# Patient Record
Sex: FEMALE | Race: WHITE | NOT HISPANIC OR LATINO | Employment: OTHER | URBAN - METROPOLITAN AREA
[De-identification: names, ages, dates, MRNs, and addresses within clinical notes are randomized per-mention and may not be internally consistent; named-entity substitution may affect disease eponyms.]

---

## 2017-06-20 ENCOUNTER — CONVERSION ENCOUNTER (OUTPATIENT)
Dept: RADIOLOGY | Facility: IMAGING CENTER | Age: 67
End: 2017-06-20

## 2017-07-27 ENCOUNTER — GENERIC CONVERSION - ENCOUNTER (OUTPATIENT)
Dept: OTHER | Facility: OTHER | Age: 67
End: 2017-07-27

## 2017-08-31 ENCOUNTER — GENERIC CONVERSION - ENCOUNTER (OUTPATIENT)
Dept: OTHER | Facility: OTHER | Age: 67
End: 2017-08-31

## 2017-10-05 ENCOUNTER — GENERIC CONVERSION - ENCOUNTER (OUTPATIENT)
Dept: OTHER | Facility: OTHER | Age: 67
End: 2017-10-05

## 2017-11-09 ENCOUNTER — GENERIC CONVERSION - ENCOUNTER (OUTPATIENT)
Dept: OTHER | Facility: OTHER | Age: 67
End: 2017-11-09

## 2017-12-08 ENCOUNTER — GENERIC CONVERSION - ENCOUNTER (OUTPATIENT)
Dept: OTHER | Facility: OTHER | Age: 67
End: 2017-12-08

## 2018-01-11 ENCOUNTER — ALLSCRIPTS OFFICE VISIT (OUTPATIENT)
Dept: OTHER | Facility: OTHER | Age: 68
End: 2018-01-11

## 2018-01-14 VITALS
BODY MASS INDEX: 30.53 KG/M2 | WEIGHT: 190 LBS | SYSTOLIC BLOOD PRESSURE: 120 MMHG | HEIGHT: 66 IN | DIASTOLIC BLOOD PRESSURE: 74 MMHG

## 2018-01-22 VITALS
WEIGHT: 190 LBS | SYSTOLIC BLOOD PRESSURE: 148 MMHG | HEIGHT: 66 IN | DIASTOLIC BLOOD PRESSURE: 82 MMHG | BODY MASS INDEX: 30.53 KG/M2

## 2018-01-22 VITALS
HEIGHT: 66 IN | BODY MASS INDEX: 30.53 KG/M2 | WEIGHT: 190 LBS | SYSTOLIC BLOOD PRESSURE: 146 MMHG | DIASTOLIC BLOOD PRESSURE: 80 MMHG

## 2018-01-22 VITALS
WEIGHT: 190 LBS | HEIGHT: 66 IN | DIASTOLIC BLOOD PRESSURE: 86 MMHG | SYSTOLIC BLOOD PRESSURE: 140 MMHG | BODY MASS INDEX: 30.53 KG/M2

## 2018-01-24 VITALS
BODY MASS INDEX: 30.53 KG/M2 | HEIGHT: 66 IN | WEIGHT: 190 LBS | DIASTOLIC BLOOD PRESSURE: 70 MMHG | SYSTOLIC BLOOD PRESSURE: 120 MMHG

## 2018-01-24 VITALS
HEIGHT: 66 IN | BODY MASS INDEX: 30.53 KG/M2 | DIASTOLIC BLOOD PRESSURE: 76 MMHG | WEIGHT: 190 LBS | SYSTOLIC BLOOD PRESSURE: 138 MMHG

## 2018-02-13 RX ORDER — CHLORAL HYDRATE 500 MG
1000 CAPSULE ORAL DAILY
COMMUNITY

## 2018-02-13 RX ORDER — PYRIDOXINE HCL (VITAMIN B6) 100 MG
TABLET ORAL
COMMUNITY
End: 2018-07-09 | Stop reason: CLARIF

## 2018-02-13 RX ORDER — ERGOCALCIFEROL (VITAMIN D2) 10 MCG
1 TABLET ORAL DAILY
COMMUNITY

## 2018-02-13 RX ORDER — BENAZEPRIL HYDROCHLORIDE 10 MG/1
TABLET ORAL
COMMUNITY
Start: 2017-09-11 | End: 2019-05-16

## 2018-02-13 RX ORDER — POTASSIUM CHLORIDE 750 MG/1
10 CAPSULE, EXTENDED RELEASE ORAL DAILY
COMMUNITY
Start: 2017-03-23 | End: 2020-11-03 | Stop reason: SDUPTHER

## 2018-02-13 RX ORDER — WARFARIN SODIUM 3 MG/1
6 TABLET ORAL
COMMUNITY
Start: 2017-03-23 | End: 2020-11-03 | Stop reason: ALTCHOICE

## 2018-02-13 RX ORDER — FUROSEMIDE 20 MG/1
TABLET ORAL
COMMUNITY
End: 2018-07-09 | Stop reason: CLARIF

## 2018-02-13 RX ORDER — PHENYTOIN SODIUM 100 MG/1
100 CAPSULE, EXTENDED RELEASE ORAL EVERY 12 HOURS SCHEDULED
COMMUNITY

## 2018-02-13 RX ORDER — AMLODIPINE BESYLATE AND BENAZEPRIL HYDROCHLORIDE 10; 20 MG/1; MG/1
CAPSULE ORAL
COMMUNITY
Start: 2017-07-31

## 2018-02-13 RX ORDER — FUROSEMIDE 40 MG/1
TABLET ORAL
COMMUNITY
End: 2019-05-16

## 2018-02-13 RX ORDER — ESCITALOPRAM OXALATE 10 MG/1
10 TABLET ORAL DAILY
COMMUNITY
End: 2021-03-19 | Stop reason: ALTCHOICE

## 2018-02-15 ENCOUNTER — OFFICE VISIT (OUTPATIENT)
Dept: UROLOGY | Facility: MEDICAL CENTER | Age: 68
End: 2018-02-15
Payer: MEDICARE

## 2018-02-15 VITALS
HEIGHT: 66 IN | BODY MASS INDEX: 30.53 KG/M2 | WEIGHT: 190 LBS | SYSTOLIC BLOOD PRESSURE: 140 MMHG | DIASTOLIC BLOOD PRESSURE: 80 MMHG

## 2018-02-15 DIAGNOSIS — N31.9 NEUROGENIC BLADDER: Primary | ICD-10-CM

## 2018-02-15 PROCEDURE — 99213 OFFICE O/P EST LOW 20 MIN: CPT | Performed by: NURSE PRACTITIONER

## 2018-02-15 RX ORDER — M-VIT,TX,IRON,MINS/CALC/FOLIC 27MG-0.4MG
1 TABLET ORAL
COMMUNITY
Start: 2013-12-12 | End: 2019-05-16

## 2018-02-15 RX ORDER — CARBIDOPA/LEVODOPA 25MG-250MG
0.5 TABLET ORAL 4 TIMES DAILY
COMMUNITY
Start: 2018-01-31 | End: 2020-11-03 | Stop reason: ALTCHOICE

## 2018-02-15 NOTE — PROGRESS NOTES
Suprapubic Catheter Exchange PROCEDURE NOTE      Pre-operative Diagnosis: Neurogenic Bladder          Post-operative Diagnosis: same      INDICATION   78 old female with paraplegia resultant NGB & long term SPT  TIME OUT: Correct patient identity  PROCEDURE   Consent: Patient was agreeable  Formal Informed consent however, not applicable  Under sterile conditions the patient was positioned  Betadine solution and sterile drapes were utilized  Non- Petroleum based gel was used to lubricate Cystotomy insertion site  90GQ silicone catheter inserted   Urine was obtained without any difficulties and Without evidence of hematuria or trauma  A Drain sponge was applied to the insertion site and wound care instructions were provided  Findings  30 ml of clear yellow urine was obtained  Note: Patient's bladder was essentially empty at time of catheter insertion      Complications:  None; patient tolerated the procedure well  Condition: stable      Plan  Maintain SPT to straight drainage  Flush daily using Shannon syringe and 60 ml NSS  Next exchange in 5 weeks  No further  intervention required                SVETLANA Bolanos              Attending Attestation: Not Applicable          Revision History       Date/Time User Provider Type Action     6/29/2017  4:44 PM Chuck Bolanos Nurse Practitioner Sign     6/29/2017  4:43 PM Chuck Bolanos Nurse Practitioner Sign     View Details Report

## 2018-03-20 LAB
PT - I.N. RATIO (HISTORICAL): 2 RATIO(INR)
PT, PATIENT (HISTORICAL): 20.2 SEC (ref 9.2–11.1)

## 2018-03-22 ENCOUNTER — PROCEDURE VISIT (OUTPATIENT)
Dept: UROLOGY | Facility: MEDICAL CENTER | Age: 68
End: 2018-03-22
Payer: MEDICARE

## 2018-03-22 DIAGNOSIS — N31.9 NEUROGENIC BLADDER: Primary | ICD-10-CM

## 2018-03-22 PROCEDURE — 51705 CHANGE OF BLADDER TUBE: CPT | Performed by: UROLOGY

## 2018-03-22 NOTE — PROGRESS NOTES
Cystostomy tube change     Date/Time 3/22/2018 2:36 PM     Performed by  Veronica Cartwright by Franck Rodrigues      Procedure Details   Procedure Notes: New 25 F tube placed without difficulty

## 2018-04-27 ENCOUNTER — OFFICE VISIT (OUTPATIENT)
Dept: UROLOGY | Facility: MEDICAL CENTER | Age: 68
End: 2018-04-27

## 2018-04-27 VITALS — HEIGHT: 66 IN | BODY MASS INDEX: 30.53 KG/M2 | WEIGHT: 190 LBS

## 2018-04-27 DIAGNOSIS — R33.9 URINARY RETENTION: ICD-10-CM

## 2018-04-27 DIAGNOSIS — N31.9 NEUROGENIC BLADDER: Primary | ICD-10-CM

## 2018-04-27 PROCEDURE — 51705 CHANGE OF BLADDER TUBE: CPT

## 2018-06-04 ENCOUNTER — CLINICAL SUPPORT (OUTPATIENT)
Dept: UROLOGY | Facility: MEDICAL CENTER | Age: 68
End: 2018-06-04
Payer: MEDICARE

## 2018-06-04 VITALS
SYSTOLIC BLOOD PRESSURE: 130 MMHG | DIASTOLIC BLOOD PRESSURE: 80 MMHG | HEIGHT: 66 IN | BODY MASS INDEX: 30.53 KG/M2 | WEIGHT: 190 LBS

## 2018-06-04 DIAGNOSIS — R33.9 URINARY RETENTION: Primary | ICD-10-CM

## 2018-06-04 PROCEDURE — 51705 CHANGE OF BLADDER TUBE: CPT

## 2018-06-04 NOTE — PROGRESS NOTES
Cystostomy tube change     Date/Time 6/4/2018 1:32 PM     Performed by  Oren Mejias by Magnolia Porter       Consent: Verbal consent obtained  Consent given by: patient  Patient identity confirmed: verbally with patient           Patient returns for routine S/P tube change  Patient prepped and 18 fr  catheter inserted using sterile technique  Urine return clear  Patient tolerated procedure well  Meatus/Stoma site clean and free of debris, no irritation or redness noted  Patient denies fevers or urinary symptoms  Patient encouraged to maintain adequate fluid intake

## 2018-06-05 NOTE — PROGRESS NOTES
I have reviewed the notes, assessments, and/or procedures performed by the medical assistant, I concur with her/his documentation of Angus Best

## 2018-06-19 LAB
PT - I.N. RATIO (HISTORICAL): 2.3 RATIO (INR) (ref 0.89–1.1)
PT, PATIENT (HISTORICAL): 23.4 SEC (ref 9.5–11.6)

## 2018-07-09 ENCOUNTER — CLINICAL SUPPORT (OUTPATIENT)
Dept: UROLOGY | Facility: MEDICAL CENTER | Age: 68
End: 2018-07-09
Payer: MEDICARE

## 2018-07-09 VITALS — WEIGHT: 190 LBS | BODY MASS INDEX: 30.53 KG/M2 | HEIGHT: 66 IN

## 2018-07-09 DIAGNOSIS — N31.9 NEUROGENIC BLADDER: Primary | ICD-10-CM

## 2018-07-09 PROCEDURE — 51710 CHANGE OF BLADDER TUBE: CPT

## 2018-07-09 NOTE — PROGRESS NOTES
Cystostomy tube change     Date/Time 7/9/2018 1:08 PM     Performed by  Yosef Tracy by Brianna Oates       Consent: Verbal consent obtained  Consent given by: patient  Patient understanding: patient states understanding of the procedure being performed      Site preparation: Betadine   Procedure Details   Procedure Notes: Pt presents for routine SPT change  SP site is clean, dry, intact, free of drainage or debris  Urine in overnight bag is clear, yellow  SPT changed without complication  Pt tolerated procedure well  Pt brings her own supplies  SPT is complicated due to pt's large pannus and somewhat immobility, making access to SP site difficult    Patient tolerance: Patient tolerated the procedure well with no immediate complications

## 2018-08-13 ENCOUNTER — CLINICAL SUPPORT (OUTPATIENT)
Dept: UROLOGY | Facility: MEDICAL CENTER | Age: 68
End: 2018-08-13
Payer: MEDICARE

## 2018-08-13 VITALS — HEIGHT: 66 IN | BODY MASS INDEX: 30.53 KG/M2 | WEIGHT: 190 LBS

## 2018-08-13 DIAGNOSIS — N31.9 NEUROGENIC BLADDER: Primary | ICD-10-CM

## 2018-08-13 PROCEDURE — 51710 CHANGE OF BLADDER TUBE: CPT

## 2018-08-13 NOTE — PROGRESS NOTES
Cystostomy tube change     Date/Time 8/13/2018 12:49 PM     Performed by  Robb Centeno by Ahmet Cervantes       Consent: Verbal consent obtained  Consent given by: patient  Patient understanding: patient states understanding of the procedure being performed      Site preparation: Betadine   Procedure Details   Patient tolerance: Patient tolerated the procedure well with no immediate complications           Pt presents for routine SPT change  SP site is clean, dry, intact, free of drainage or debris  Urine in overnight bag is clear, yellow  SPT changed without complication  Pt tolerated procedure well  Pt brings her own supplies  SPT is complicated due to pt's large pannus and somewhat immobility, making access to SP site difficult

## 2018-08-28 ENCOUNTER — TRANSCRIBE ORDERS (OUTPATIENT)
Dept: ADMINISTRATIVE | Facility: HOSPITAL | Age: 68
End: 2018-08-28

## 2018-08-28 ENCOUNTER — HOSPITAL ENCOUNTER (OUTPATIENT)
Dept: RADIOLOGY | Facility: IMAGING CENTER | Age: 68
Discharge: HOME | End: 2018-08-28
Payer: MEDICARE

## 2018-08-28 ENCOUNTER — APPOINTMENT (OUTPATIENT)
Dept: LAB | Facility: IMAGING CENTER | Age: 68
End: 2018-08-28
Payer: MEDICARE

## 2018-08-28 DIAGNOSIS — E78.5 HYPERLIPIDEMIA, UNSPECIFIED HYPERLIPIDEMIA TYPE: ICD-10-CM

## 2018-08-28 DIAGNOSIS — I10 ESSENTIAL HYPERTENSION: ICD-10-CM

## 2018-08-28 DIAGNOSIS — I82.5Z9 CHRONIC DEEP VEIN THROMBOSIS (DVT) OF DISTAL VEIN OF LOWER EXTREMITY, UNSPECIFIED LATERALITY (HCC): ICD-10-CM

## 2018-08-28 DIAGNOSIS — E03.1 CONGENITAL HYPOTHYROIDISM: ICD-10-CM

## 2018-08-28 DIAGNOSIS — D64.9 ANEMIA, UNSPECIFIED TYPE: ICD-10-CM

## 2018-08-28 DIAGNOSIS — I10 ESSENTIAL HYPERTENSION: Primary | ICD-10-CM

## 2018-08-28 DIAGNOSIS — M19.042 ARTHRITIS OF LEFT HAND: Primary | ICD-10-CM

## 2018-08-28 DIAGNOSIS — M19.042 ARTHRITIS OF LEFT HAND: ICD-10-CM

## 2018-08-28 DIAGNOSIS — E03.1 CONGENITAL HYPOTHYROIDISM: Primary | ICD-10-CM

## 2018-08-28 LAB
ALBUMIN SERPL BCP-MCNC: 3.8 G/DL (ref 3.5–5)
ALP SERPL-CCNC: 95 U/L (ref 46–116)
ALT SERPL W P-5'-P-CCNC: 13 U/L (ref 12–78)
ANION GAP SERPL CALCULATED.3IONS-SCNC: 5 MMOL/L (ref 4–13)
AST SERPL W P-5'-P-CCNC: 23 U/L (ref 5–45)
BILIRUB SERPL-MCNC: 0.39 MG/DL (ref 0.2–1)
BUN SERPL-MCNC: 10 MG/DL (ref 5–25)
CALCIUM SERPL-MCNC: 8.5 MG/DL (ref 8.3–10.1)
CHLORIDE SERPL-SCNC: 96 MMOL/L (ref 100–108)
CHOLEST SERPL-MCNC: 210 MG/DL (ref 50–200)
CO2 SERPL-SCNC: 30 MMOL/L (ref 21–32)
CREAT SERPL-MCNC: 0.24 MG/DL (ref 0.6–1.3)
ERYTHROCYTE [DISTWIDTH] IN BLOOD BY AUTOMATED COUNT: 13 % (ref 11.6–15.1)
ERYTHROCYTE [SEDIMENTATION RATE] IN BLOOD: 12 MM/HOUR (ref 0–20)
GFR SERPL CREATININE-BSD FRML MDRD: 128 ML/MIN/1.73SQ M
GLUCOSE P FAST SERPL-MCNC: 78 MG/DL (ref 65–99)
HCT VFR BLD AUTO: 41.7 % (ref 34.8–46.1)
HDLC SERPL-MCNC: 98 MG/DL (ref 40–60)
HGB BLD-MCNC: 13.6 G/DL (ref 11.5–15.4)
LDLC SERPL CALC-MCNC: 102 MG/DL (ref 0–100)
MCH RBC QN AUTO: 29.4 PG (ref 26.8–34.3)
MCHC RBC AUTO-ENTMCNC: 32.6 G/DL (ref 31.4–37.4)
MCV RBC AUTO: 90 FL (ref 82–98)
NONHDLC SERPL-MCNC: 112 MG/DL
PLATELET # BLD AUTO: 287 THOUSANDS/UL (ref 149–390)
PMV BLD AUTO: 9.4 FL (ref 8.9–12.7)
POTASSIUM SERPL-SCNC: 4.9 MMOL/L (ref 3.5–5.3)
PROT SERPL-MCNC: 7.9 G/DL (ref 6.4–8.2)
RBC # BLD AUTO: 4.63 MILLION/UL (ref 3.81–5.12)
SODIUM SERPL-SCNC: 131 MMOL/L (ref 136–145)
TRIGL SERPL-MCNC: 49 MG/DL
TSH SERPL DL<=0.05 MIU/L-ACNC: 0.46 UIU/ML (ref 0.36–3.74)
WBC # BLD AUTO: 3.98 THOUSAND/UL (ref 4.31–10.16)

## 2018-08-28 PROCEDURE — 73110 X-RAY EXAM OF WRIST: CPT

## 2018-08-28 PROCEDURE — 84443 ASSAY THYROID STIM HORMONE: CPT

## 2018-08-28 PROCEDURE — 85652 RBC SED RATE AUTOMATED: CPT

## 2018-08-28 PROCEDURE — 80061 LIPID PANEL: CPT

## 2018-08-28 PROCEDURE — 36415 COLL VENOUS BLD VENIPUNCTURE: CPT

## 2018-08-28 PROCEDURE — 85027 COMPLETE CBC AUTOMATED: CPT

## 2018-08-28 PROCEDURE — 80053 COMPREHEN METABOLIC PANEL: CPT

## 2018-09-17 ENCOUNTER — CLINICAL SUPPORT (OUTPATIENT)
Dept: UROLOGY | Facility: MEDICAL CENTER | Age: 68
End: 2018-09-17
Payer: MEDICARE

## 2018-09-17 DIAGNOSIS — R33.9 RETENTION OF URINE: Primary | ICD-10-CM

## 2018-09-17 PROCEDURE — 51710 CHANGE OF BLADDER TUBE: CPT

## 2018-09-17 NOTE — PROGRESS NOTES
Cystostomy tube change     Date/Time 9/17/2018 1:35 PM     Performed by  SIMEON GRAF     Authorized by Jose Brown       Consent: Verbal consent obtained  Risks and benefits: risks, benefits and alternatives were discussed  Consent given by: patient  Patient understanding: patient states understanding of the procedure being performed  Patient consent: the patient's understanding of the procedure matches consent given  Patient identity confirmed: verbally with patient      Site preparation: Betadine    Local anesthesia used: no     Anesthesia   Local anesthesia used: no     Sedation   Patient sedated: no      Specimen: no    Culture: no   Procedure Details   Procedure Notes: S/P tube changed with difficulty while pt was in chair at her request  Difficulty was from pt habitus and since it was done in the chair  Pt brings her own supplies  Return of hematuria without clots     Patient tolerance: Patient tolerated the procedure well with no immediate complications

## 2018-10-22 ENCOUNTER — CLINICAL SUPPORT (OUTPATIENT)
Dept: UROLOGY | Facility: MEDICAL CENTER | Age: 68
End: 2018-10-22
Payer: MEDICARE

## 2018-10-22 VITALS
HEIGHT: 66 IN | SYSTOLIC BLOOD PRESSURE: 132 MMHG | WEIGHT: 190 LBS | BODY MASS INDEX: 30.53 KG/M2 | DIASTOLIC BLOOD PRESSURE: 84 MMHG

## 2018-10-22 DIAGNOSIS — R82.90 ABNORMAL URINE: ICD-10-CM

## 2018-10-22 DIAGNOSIS — Z87.440 HISTORY OF UTI: ICD-10-CM

## 2018-10-22 DIAGNOSIS — Z87.440 HISTORY OF UTI: Primary | ICD-10-CM

## 2018-10-22 DIAGNOSIS — N31.9 NEUROGENIC BLADDER: Primary | ICD-10-CM

## 2018-10-22 PROCEDURE — 87186 SC STD MICRODIL/AGAR DIL: CPT | Performed by: UROLOGY

## 2018-10-22 PROCEDURE — 87086 URINE CULTURE/COLONY COUNT: CPT | Performed by: UROLOGY

## 2018-10-22 PROCEDURE — 51705 CHANGE OF BLADDER TUBE: CPT | Performed by: UROLOGY

## 2018-10-22 PROCEDURE — 87077 CULTURE AEROBIC IDENTIFY: CPT | Performed by: UROLOGY

## 2018-10-22 NOTE — PROGRESS NOTES
Patient presents for her routine S/P tube change  She remains in her wheelchair as the indwelling catheter was removed gently and without complication  She was prepped with Betadine solution and an 18 Silicone catheter was inserted into the bladder using sterile technique  The balloon was inflated with 10 CCs of sterile water and was connected to a leg bag  She states at a visit last week with her PCP, they asked for a urine sample  She requested that I take some from the new Osorio in case they wanted it for testing  I was able to acquire a small urine sample  She's been scheduled for another tube change in 5 weeks  *Spoke with a nurse from her PCP's office; they would like a culture done  I'll send it out   *

## 2018-10-24 LAB
BACTERIA UR CULT: ABNORMAL
BACTERIA UR CULT: ABNORMAL

## 2018-10-30 ENCOUNTER — TRANSCRIBE ORDERS (OUTPATIENT)
Dept: ADMINISTRATIVE | Facility: HOSPITAL | Age: 68
End: 2018-10-30

## 2018-10-30 ENCOUNTER — APPOINTMENT (OUTPATIENT)
Dept: LAB | Facility: IMAGING CENTER | Age: 68
End: 2018-10-30
Payer: MEDICARE

## 2018-10-30 DIAGNOSIS — I82.529: Primary | ICD-10-CM

## 2018-10-30 DIAGNOSIS — I82.529: ICD-10-CM

## 2018-10-30 DIAGNOSIS — E78.5 HYPERLIPIDEMIA, UNSPECIFIED HYPERLIPIDEMIA TYPE: Primary | ICD-10-CM

## 2018-10-30 DIAGNOSIS — E78.5 HYPERLIPIDEMIA, UNSPECIFIED HYPERLIPIDEMIA TYPE: ICD-10-CM

## 2018-10-30 LAB
ALBUMIN SERPL BCP-MCNC: 3.9 G/DL (ref 3.5–5)
ALP SERPL-CCNC: 114 U/L (ref 46–116)
ALT SERPL W P-5'-P-CCNC: 24 U/L (ref 12–78)
AST SERPL W P-5'-P-CCNC: 19 U/L (ref 5–45)
BILIRUB DIRECT SERPL-MCNC: 0.11 MG/DL (ref 0–0.2)
BILIRUB SERPL-MCNC: 0.23 MG/DL (ref 0.2–1)
INR PPP: 2.89 (ref 0.86–1.17)
PROT SERPL-MCNC: 7.9 G/DL (ref 6.4–8.2)
PROTHROMBIN TIME: 30.3 SECONDS (ref 11.8–14.2)

## 2018-10-30 PROCEDURE — 36415 COLL VENOUS BLD VENIPUNCTURE: CPT

## 2018-10-30 PROCEDURE — 80076 HEPATIC FUNCTION PANEL: CPT

## 2018-10-30 PROCEDURE — 85610 PROTHROMBIN TIME: CPT

## 2018-11-26 ENCOUNTER — CLINICAL SUPPORT (OUTPATIENT)
Dept: UROLOGY | Facility: MEDICAL CENTER | Age: 68
End: 2018-11-26
Payer: MEDICARE

## 2018-11-26 DIAGNOSIS — R33.9 RETENTION OF URINE: Primary | ICD-10-CM

## 2018-11-26 PROCEDURE — 99211 OFF/OP EST MAY X REQ PHY/QHP: CPT

## 2018-11-26 NOTE — PROGRESS NOTES
Cystostomy tube change     Date/Time 11/26/2018 3:07 PM     Performed by  SIMEON GRAF     Authorized by Geoffrey Metcalf       Consent: Verbal consent obtained  Consent given by: patient  Patient understanding: patient states understanding of the procedure being performed  Patient consent: the patient's understanding of the procedure matches consent given  Procedure consent: procedure consent matches procedure scheduled  Patient identity confirmed: verbally with patient      Preparation: Patient was prepped and draped in the usual sterile fashion  Local anesthesia used: no     Anesthesia   Local anesthesia used: no     Sedation   Patient sedated: no     Procedure Details   Procedure Notes:     Lucy Mccarthy  1950  [id number]          S/P tube removed after deflation of an intact balloon  Site prepped with  HIBICLENS  New #[id number] silicone spt change via aseptic technique with with difficulty  This was d/t pt habitus and changing s/p tube in her chair  Irrigated easily for clear mariel urine return  Attached to drainage bag as usual  Encouraged pt to consider having s/p tube changed at home  She doesn't want to do that            Patient tolerance: Patient tolerated the procedure well with no immediate complications

## 2018-12-18 ENCOUNTER — TRANSCRIBE ORDERS (OUTPATIENT)
Dept: ADMINISTRATIVE | Facility: HOSPITAL | Age: 68
End: 2018-12-18

## 2018-12-19 ENCOUNTER — TRANSCRIBE ORDERS (OUTPATIENT)
Dept: ADMINISTRATIVE | Facility: HOSPITAL | Age: 68
End: 2018-12-19

## 2018-12-19 ENCOUNTER — APPOINTMENT (OUTPATIENT)
Dept: LAB | Facility: IMAGING CENTER | Age: 68
End: 2018-12-19
Payer: MEDICARE

## 2018-12-19 DIAGNOSIS — I82.529 CHRONIC DEEP VEIN THROMBOSIS (DVT) OF ILIAC VEIN, UNSPECIFIED LATERALITY (HCC): ICD-10-CM

## 2018-12-19 DIAGNOSIS — E78.5 HYPERLIPIDEMIA, UNSPECIFIED HYPERLIPIDEMIA TYPE: ICD-10-CM

## 2018-12-19 DIAGNOSIS — I82.529 CHRONIC DEEP VEIN THROMBOSIS (DVT) OF ILIAC VEIN, UNSPECIFIED LATERALITY (HCC): Primary | ICD-10-CM

## 2018-12-19 DIAGNOSIS — E78.5 HYPERLIPIDEMIA, UNSPECIFIED HYPERLIPIDEMIA TYPE: Primary | ICD-10-CM

## 2018-12-19 LAB
CHOLEST SERPL-MCNC: 162 MG/DL (ref 50–200)
HDLC SERPL-MCNC: 95 MG/DL (ref 40–60)
INR PPP: 2.32 (ref 0.86–1.17)
LDLC SERPL CALC-MCNC: 57 MG/DL (ref 0–100)
PROTHROMBIN TIME: 25.5 SECONDS (ref 11.8–14.2)
TRIGL SERPL-MCNC: 52 MG/DL

## 2018-12-19 PROCEDURE — 36415 COLL VENOUS BLD VENIPUNCTURE: CPT

## 2018-12-19 PROCEDURE — 80061 LIPID PANEL: CPT

## 2018-12-19 PROCEDURE — 85610 PROTHROMBIN TIME: CPT

## 2018-12-31 ENCOUNTER — CLINICAL SUPPORT (OUTPATIENT)
Dept: UROLOGY | Facility: MEDICAL CENTER | Age: 68
End: 2018-12-31
Payer: MEDICARE

## 2018-12-31 DIAGNOSIS — N31.9 NEUROGENIC BLADDER: Primary | ICD-10-CM

## 2018-12-31 PROCEDURE — 99211 OFF/OP EST MAY X REQ PHY/QHP: CPT

## 2018-12-31 NOTE — PROGRESS NOTES
Cystostomy tube change     Date/Time 12/31/2018 1:34 PM     Performed by  Matthew Can by Esperanza Griffin       Consent: Verbal consent obtained  Consent given by: patient  Patient understanding: patient states understanding of the procedure being performed      Site preparation: Chlorhexidine   Sedation   Patient sedated: no      Specimen: no    Culture: no   Procedure Details   Procedure Notes: Patient returns for routine SPT change  Patient prepped and _90__ fr   silicone catheter inserted using sterile technique  10 cc sterile water in the balloon  Urine return pink  Patient tolerated procedure well  Stoma site clean and free of debris, no irritation or redness noted  Patient denies fevers or urinary symptoms  Patient encouraged to maintain adequate fluid intake  Pt brings her own supplies      Patient tolerance: Patient tolerated the procedure well with no immediate complications

## 2019-01-14 ENCOUNTER — TELEPHONE (OUTPATIENT)
Dept: UROLOGY | Facility: MEDICAL CENTER | Age: 69
End: 2019-01-14

## 2019-01-14 NOTE — TELEPHONE ENCOUNTER
Pt calling- scheduled 2/4 w Dr Thorne  for SPT change, would like to know if Nursing Visit can be scheduled instead  Please advise and call back    [phone]

## 2019-02-04 ENCOUNTER — OFFICE VISIT (OUTPATIENT)
Dept: UROLOGY | Facility: MEDICAL CENTER | Age: 69
End: 2019-02-04
Payer: MEDICARE

## 2019-02-04 VITALS — DIASTOLIC BLOOD PRESSURE: 84 MMHG | HEART RATE: 57 BPM | SYSTOLIC BLOOD PRESSURE: 158 MMHG

## 2019-02-04 DIAGNOSIS — N31.9 NEUROGENIC BLADDER: Primary | ICD-10-CM

## 2019-02-04 PROCEDURE — 99213 OFFICE O/P EST LOW 20 MIN: CPT | Performed by: UROLOGY
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
## 2019-03-05 ENCOUNTER — CLINICAL SUPPORT (OUTPATIENT)
Dept: UROLOGY | Facility: MEDICAL CENTER | Age: 69
End: 2019-03-05
Payer: MEDICARE

## 2019-03-05 DIAGNOSIS — N31.9 NEUROGENIC BLADDER: Primary | ICD-10-CM

## 2019-03-05 PROCEDURE — 51705 CHANGE OF BLADDER TUBE: CPT

## 2019-03-05 NOTE — PROGRESS NOTES
Cystostomy tube change     Date/Time 3/5/2019 1:39 PM     Performed by  Kindra High MA     Authorized by Aggie Petersen MD       Consent given by: patient  Patient understanding: patient states understanding of the procedure being performed      Site preparation: Chlorhexidine   Procedure Details   Procedure Notes: Pt presents for routine SPT change  SP site is clean, dry, intact, free of drainage or debris  Urine in overnight bag is clear, yellow with some brown sediment  89F silicone SPT changed without complication  Balloon inflated with 10cc sterile water  Pt tolerated procedure well  Pt brings her own supplies      Patient tolerance: Patient tolerated the procedure well with no immediate complications

## 2019-03-05 NOTE — PROGRESS NOTES
I have reviewed the notes, assessments, and/or procedures performed , I concur with her/his documentation of Anthony Rios

## 2019-03-14 ENCOUNTER — TELEPHONE (OUTPATIENT)
Dept: UROLOGY | Facility: MEDICAL CENTER | Age: 69
End: 2019-03-14

## 2019-03-14 DIAGNOSIS — N39.0 URINARY TRACT INFECTION WITHOUT HEMATURIA, SITE UNSPECIFIED: Primary | ICD-10-CM

## 2019-03-14 RX ORDER — SULFAMETHOXAZOLE AND TRIMETHOPRIM 800; 160 MG/1; MG/1
1 TABLET ORAL EVERY 12 HOURS SCHEDULED
Qty: 20 TABLET | Refills: 0 | Status: SHIPPED | OUTPATIENT
Start: 2019-03-14 | End: 2019-03-24

## 2019-03-14 NOTE — TELEPHONE ENCOUNTER
Spoke to pt and explained why Char Brandon prescribed Bactrim instead of Cipro due to a prior UC  Patient will get pharmacy to deliver the antibiotic

## 2019-03-14 NOTE — TELEPHONE ENCOUNTER
Pt called asking to speak with clinical she had blockage which she irrigated,she thinks she might have infection asking for script for Cipro

## 2019-03-14 NOTE — TELEPHONE ENCOUNTER
Please notify pt that I sent a prescription for Bactrim to the pharmacy on file    Ciprofloxacin was not chosen due to a previous urinalysis and culture which shows Proteus which is resistant to Cipro

## 2019-03-26 ENCOUNTER — TELEPHONE (OUTPATIENT)
Dept: UROLOGY | Facility: CLINIC | Age: 69
End: 2019-03-26

## 2019-03-27 ENCOUNTER — OFFICE VISIT (OUTPATIENT)
Dept: UROLOGY | Facility: MEDICAL CENTER | Age: 69
End: 2019-03-27
Payer: MEDICARE

## 2019-03-27 VITALS
WEIGHT: 190 LBS | HEIGHT: 66 IN | DIASTOLIC BLOOD PRESSURE: 78 MMHG | HEART RATE: 56 BPM | BODY MASS INDEX: 30.53 KG/M2 | SYSTOLIC BLOOD PRESSURE: 140 MMHG

## 2019-03-27 DIAGNOSIS — N31.9 NEUROGENIC BLADDER: Primary | ICD-10-CM

## 2019-03-27 PROCEDURE — 99212 OFFICE O/P EST SF 10 MIN: CPT | Performed by: UROLOGY

## 2019-03-27 RX ORDER — SULFAMETHOXAZOLE AND TRIMETHOPRIM 800; 160 MG/1; MG/1
1 TABLET ORAL
COMMUNITY
End: 2019-05-16

## 2019-03-27 RX ORDER — ATORVASTATIN CALCIUM 20 MG/1
20 TABLET, FILM COATED ORAL DAILY
COMMUNITY
Start: 2019-03-01

## 2019-03-27 NOTE — PROGRESS NOTES
Assessment/Plan:    Neurogenic bladder  Appointment with Dr Chandu Leach as soon as possible  Diagnoses and all orders for this visit:    Neurogenic bladder    Other orders  -     atorvastatin (LIPITOR) 20 mg tablet  -     sulfamethoxazole-trimethoprim (BACTRIM DS) 800-160 mg per tablet; Take 1 tablet by mouth          Subjective:      Patient ID: Anthony Jensen is a 80 y o  female  HPI  Neurogenic bladder:  The patient has a chronic suprapubic tube, but is leaking from her urethra  She was seen in the [facility] Emergency Room on March 24, 2019 with a plugged catheter  The catheter was replaced  She was instructed to make an appointment in the office  The patient returns today  The catheter is working properly  She thought she was having an appointment with Dr Randy Chiu to discuss definitive treatment of her urethral urinary leakage  She is quite distressed to find that her appointment was with me rather than with Dr Randy Chiu  The face-to-face time with the patient resolved around her dissatisfaction with not seeing Dr Randy Chiu and the feeling that today's office visit was complete waist   I explained that I would arrange for her to get an appoint with Dr Randy Chiu soon as possible  The patient was profoundly disappointed when she left    The following portions of the patient's history were reviewed and updated as appropriate: allergies, current medications, past family history, past medical history, past social history, past surgical history and problem list     Review of Systems      Objective:      /78   Pulse 56   Ht 5' 6" (1 676 m)   Wt 86 2 kg (190 lb)   BMI 30 67 kg/m²          Physical Exam

## 2019-03-27 NOTE — TELEPHONE ENCOUNTER
I answered incoming call to which caller did not identify herself she asked if I did appointment scheduling which I answered yes she proceeded to express how upset she was and what a waste of time for her to come to an appointment and not see her doctor and the doctor who did see her does not do surgery and only spent 5 minutes with her ,I managed to ask if she spoke to anyone in the office which she replied of course she did and the  was going to be hearing from her as well, I sat silent and realized she had hung up

## 2019-03-27 NOTE — PROGRESS NOTES
Assessment/Plan:      There are no diagnoses linked to this encounter  Subjective:     Patient ID: Linda Baptiste is a 80 y o  female  HPI  Neurogenic bladder:  The patient has a chronic suprapubic tube, but is leaking from her urethra  She was seen in the [facility] on March 24, 2019 with a plugged catheter  The catheter was replaced  She was instructed to make an appointment in the office  The patient returns today  The catheter is working properly  She thought she was having an appointment with Dr Aurea Mcdaniel to discuss definitive treatment of her urethral urinary leakage  She is quite distressed to find that her appointment was with me rather than with Dr Aurea Mcdaniel  The face-to-face time with the patient resolved around her dissatisfaction with not seeing Dr Aurea Mcdaniel and the feeling that today's office visit was complete waist      The patient is paraplegic, and lives in a wheelchair  Transportation for her is a major undertaking  I explained that I would arrange for her to get an appoint with Dr Aurea Mcdaniel soon as possible        Review of Systems      Objective:     Physical Exam

## 2019-04-01 ENCOUNTER — OFFICE VISIT (OUTPATIENT)
Dept: UROLOGY | Facility: MEDICAL CENTER | Age: 69
End: 2019-04-01
Payer: MEDICARE

## 2019-04-01 VITALS
HEART RATE: 74 BPM | BODY MASS INDEX: 30.53 KG/M2 | DIASTOLIC BLOOD PRESSURE: 60 MMHG | HEIGHT: 66 IN | WEIGHT: 190 LBS | SYSTOLIC BLOOD PRESSURE: 118 MMHG

## 2019-04-01 DIAGNOSIS — N31.9 NEUROGENIC BLADDER: Primary | ICD-10-CM

## 2019-04-01 PROCEDURE — 99213 OFFICE O/P EST LOW 20 MIN: CPT | Performed by: UROLOGY

## 2019-04-01 RX ORDER — SULFAMETHOXAZOLE AND TRIMETHOPRIM 800; 160 MG/1; MG/1
1 TABLET ORAL 2 TIMES DAILY
Qty: 6 TABLET | Refills: 0 | Status: SHIPPED | OUTPATIENT
Start: 2019-04-01 | End: 2019-04-04

## 2019-04-09 ENCOUNTER — TELEPHONE (OUTPATIENT)
Dept: UROLOGY | Facility: MEDICAL CENTER | Age: 69
End: 2019-04-09

## 2019-04-09 DIAGNOSIS — N31.9 NEUROGENIC BLADDER: Primary | ICD-10-CM

## 2019-04-09 DIAGNOSIS — R33.9 URINARY RETENTION: ICD-10-CM

## 2019-04-22 ENCOUNTER — TELEPHONE (OUTPATIENT)
Dept: UROLOGY | Facility: MEDICAL CENTER | Age: 69
End: 2019-04-22

## 2019-04-23 DIAGNOSIS — N39.41 URGE INCONTINENCE: Primary | ICD-10-CM

## 2019-04-23 RX ORDER — OXYBUTYNIN CHLORIDE 15 MG/1
15 TABLET, EXTENDED RELEASE ORAL DAILY
Qty: 30 TABLET | Refills: 11 | Status: SHIPPED | OUTPATIENT
Start: 2019-04-23 | End: 2020-11-03 | Stop reason: ALTCHOICE

## 2019-05-03 ENCOUNTER — PROCEDURE VISIT (OUTPATIENT)
Dept: UROLOGY | Facility: MEDICAL CENTER | Age: 69
End: 2019-05-03
Payer: MEDICARE

## 2019-05-03 VITALS
HEIGHT: 66 IN | BODY MASS INDEX: 30.53 KG/M2 | WEIGHT: 190 LBS | SYSTOLIC BLOOD PRESSURE: 120 MMHG | DIASTOLIC BLOOD PRESSURE: 70 MMHG | HEART RATE: 80 BPM

## 2019-05-03 DIAGNOSIS — N31.9 NEUROGENIC BLADDER: Primary | ICD-10-CM

## 2019-05-03 DIAGNOSIS — N39.41 URGE INCONTINENCE: ICD-10-CM

## 2019-05-03 PROCEDURE — 99213 OFFICE O/P EST LOW 20 MIN: CPT | Performed by: UROLOGY

## 2019-05-03 PROCEDURE — 52000 CYSTOURETHROSCOPY: CPT | Performed by: UROLOGY

## 2019-05-05 ENCOUNTER — TELEPHONE (OUTPATIENT)
Dept: UROLOGY | Facility: CLINIC | Age: 69
End: 2019-05-05

## 2019-05-05 DIAGNOSIS — N31.9 NEUROGENIC BLADDER: Primary | ICD-10-CM

## 2019-05-16 ENCOUNTER — TELEPHONE (OUTPATIENT)
Dept: UROLOGY | Facility: AMBULATORY SURGERY CENTER | Age: 69
End: 2019-05-16

## 2019-05-16 ENCOUNTER — HOSPITAL ENCOUNTER (EMERGENCY)
Facility: HOSPITAL | Age: 69
Discharge: HOME | End: 2019-05-16
Attending: EMERGENCY MEDICINE
Payer: MEDICARE

## 2019-05-16 VITALS
SYSTOLIC BLOOD PRESSURE: 154 MMHG | DIASTOLIC BLOOD PRESSURE: 74 MMHG | BODY MASS INDEX: 34.84 KG/M2 | HEART RATE: 74 BPM | OXYGEN SATURATION: 92 % | WEIGHT: 215.83 LBS | RESPIRATION RATE: 18 BRPM | TEMPERATURE: 97.5 F

## 2019-05-16 DIAGNOSIS — R32 URINARY INCONTINENCE: ICD-10-CM

## 2019-05-16 DIAGNOSIS — T83.9XXA PROBLEM WITH FOLEY CATHETER, INITIAL ENCOUNTER (HCC): ICD-10-CM

## 2019-05-16 DIAGNOSIS — N31.9 NEUROGENIC BLADDER: Primary | ICD-10-CM

## 2019-05-16 LAB
ANION GAP SERPL CALCULATED.3IONS-SCNC: 5 MMOL/L (ref 4–13)
APTT PPP: 59 SECONDS (ref 26–38)
BACTERIA UR QL AUTO: ABNORMAL /HPF
BASOPHILS # BLD AUTO: 0.02 THOUSANDS/ΜL (ref 0–0.1)
BASOPHILS NFR BLD AUTO: 0 % (ref 0–1)
BILIRUB UR QL STRIP: NEGATIVE
BUN SERPL-MCNC: 12 MG/DL (ref 5–25)
CALCIUM SERPL-MCNC: 9.4 MG/DL (ref 8.3–10.1)
CHLORIDE SERPL-SCNC: 102 MMOL/L (ref 100–108)
CLARITY UR: CLEAR
CO2 SERPL-SCNC: 32 MMOL/L (ref 21–32)
COLOR UR: YELLOW
CREAT SERPL-MCNC: 0.29 MG/DL (ref 0.6–1.3)
EOSINOPHIL # BLD AUTO: 0.1 THOUSAND/ΜL (ref 0–0.61)
EOSINOPHIL NFR BLD AUTO: 2 % (ref 0–6)
ERYTHROCYTE [DISTWIDTH] IN BLOOD BY AUTOMATED COUNT: 12.9 % (ref 11.6–15.1)
GFR SERPL CREATININE-BSD FRML MDRD: 119 ML/MIN/1.73SQ M
GLUCOSE SERPL-MCNC: 110 MG/DL (ref 65–140)
GLUCOSE UR STRIP-MCNC: NEGATIVE MG/DL
HCT VFR BLD AUTO: 41 % (ref 34.8–46.1)
HGB BLD-MCNC: 12.9 G/DL (ref 11.5–15.4)
HGB UR QL STRIP.AUTO: NEGATIVE
IMM GRANULOCYTES # BLD AUTO: 0.01 THOUSAND/UL (ref 0–0.2)
IMM GRANULOCYTES NFR BLD AUTO: 0 % (ref 0–2)
INR PPP: 2.73 (ref 0.86–1.17)
KETONES UR STRIP-MCNC: NEGATIVE MG/DL
LEUKOCYTE ESTERASE UR QL STRIP: ABNORMAL
LYMPHOCYTES # BLD AUTO: 0.8 THOUSANDS/ΜL (ref 0.6–4.47)
LYMPHOCYTES NFR BLD AUTO: 16 % (ref 14–44)
MCH RBC QN AUTO: 29.3 PG (ref 26.8–34.3)
MCHC RBC AUTO-ENTMCNC: 31.5 G/DL (ref 31.4–37.4)
MCV RBC AUTO: 93 FL (ref 82–98)
MONOCYTES # BLD AUTO: 0.47 THOUSAND/ΜL (ref 0.17–1.22)
MONOCYTES NFR BLD AUTO: 9 % (ref 4–12)
NEUTROPHILS # BLD AUTO: 3.69 THOUSANDS/ΜL (ref 1.85–7.62)
NEUTS SEG NFR BLD AUTO: 73 % (ref 43–75)
NITRITE UR QL STRIP: NEGATIVE
NON-SQ EPI CELLS URNS QL MICRO: ABNORMAL /HPF
NRBC BLD AUTO-RTO: 0 /100 WBCS
PH UR STRIP.AUTO: 6.5 [PH] (ref 4.5–8)
PLATELET # BLD AUTO: 239 THOUSANDS/UL (ref 149–390)
PMV BLD AUTO: 9 FL (ref 8.9–12.7)
POTASSIUM SERPL-SCNC: 4.7 MMOL/L (ref 3.5–5.3)
PROT UR STRIP-MCNC: NEGATIVE MG/DL
PROTHROMBIN TIME: 29 SECONDS (ref 11.8–14.2)
RBC # BLD AUTO: 4.41 MILLION/UL (ref 3.81–5.12)
RBC #/AREA URNS AUTO: ABNORMAL /HPF
SODIUM SERPL-SCNC: 139 MMOL/L (ref 136–145)
SP GR UR STRIP.AUTO: 1.01 (ref 1–1.03)
UROBILINOGEN UR QL STRIP.AUTO: 0.2 E.U./DL
WBC # BLD AUTO: 5.09 THOUSAND/UL (ref 4.31–10.16)
WBC #/AREA URNS AUTO: ABNORMAL /HPF

## 2019-05-16 PROCEDURE — 99284 EMERGENCY DEPT VISIT MOD MDM: CPT

## 2019-05-16 PROCEDURE — 99282 EMERGENCY DEPT VISIT SF MDM: CPT | Performed by: EMERGENCY MEDICINE

## 2019-05-16 PROCEDURE — 80048 BASIC METABOLIC PNL TOTAL CA: CPT | Performed by: EMERGENCY MEDICINE

## 2019-05-16 PROCEDURE — 85730 THROMBOPLASTIN TIME PARTIAL: CPT | Performed by: EMERGENCY MEDICINE

## 2019-05-16 PROCEDURE — 85610 PROTHROMBIN TIME: CPT | Performed by: EMERGENCY MEDICINE

## 2019-05-16 PROCEDURE — 81001 URINALYSIS AUTO W/SCOPE: CPT

## 2019-05-16 PROCEDURE — 99215 OFFICE O/P EST HI 40 MIN: CPT | Performed by: PHYSICIAN ASSISTANT

## 2019-05-16 PROCEDURE — 36415 COLL VENOUS BLD VENIPUNCTURE: CPT | Performed by: EMERGENCY MEDICINE

## 2019-05-16 PROCEDURE — 85025 COMPLETE CBC W/AUTO DIFF WBC: CPT | Performed by: EMERGENCY MEDICINE

## 2019-05-30 ENCOUNTER — CLINICAL SUPPORT (OUTPATIENT)
Dept: UROLOGY | Facility: MEDICAL CENTER | Age: 69
End: 2019-05-30
Payer: MEDICARE

## 2019-05-30 DIAGNOSIS — R33.9 RETENTION OF URINE: Primary | ICD-10-CM

## 2019-05-30 PROCEDURE — 51705 CHANGE OF BLADDER TUBE: CPT

## 2019-07-05 ENCOUNTER — PROCEDURE VISIT (OUTPATIENT)
Dept: UROLOGY | Facility: MEDICAL CENTER | Age: 69
End: 2019-07-05
Payer: MEDICARE

## 2019-07-05 VITALS — HEART RATE: 85 BPM | SYSTOLIC BLOOD PRESSURE: 140 MMHG | DIASTOLIC BLOOD PRESSURE: 84 MMHG

## 2019-07-05 DIAGNOSIS — N31.9 NEUROGENIC BLADDER: ICD-10-CM

## 2019-07-05 DIAGNOSIS — R33.9 URINARY RETENTION: Primary | ICD-10-CM

## 2019-07-05 PROCEDURE — 51705 CHANGE OF BLADDER TUBE: CPT | Performed by: UROLOGY

## 2019-07-05 RX ORDER — AMLODIPINE BESYLATE 5 MG/1
5 TABLET ORAL DAILY
COMMUNITY
End: 2021-03-19 | Stop reason: ALTCHOICE

## 2019-07-05 NOTE — PROGRESS NOTES
7/5/2019    Nicolas Huffman  1950  [id number]    Diagnosis  Chief Complaint     SP Tube change          Patient presents for SP Tube change managed by Dr Jessa Gil  Return in 4-5 weeks for SPT change    Procedure: Suprapubic Tube Change        Cystostomy tube change     Date/Time 7/5/2019 1:26 PM     Performed by  Florecita Madrid LPN     Authorized by Braulio Rai MD       Consent: Verbal consent obtained  Consent given by: patient  Patient understanding: patient states understanding of the procedure being performed  Patient identity confirmed: verbally with patient      Preparation: Patient was prepped and draped in the usual sterile fashion  Site preparation: Chlorhexidine             Current catheter removed with some difficulty after deflation of an intact balloon  Sediment noted on tip of catheter  Site prepped with Hibiclens, new 15A  silicone spt change via aseptic technique without incident, 10 ml balloon inflated with sterile water and irrigated easily for pink-tinged return  Patient tolerated well  Attached to drainage bag       Vitals:    07/05/19 1249   BP: 140/84   Pulse: 85         Florecita Madrid LPN

## 2019-08-07 ENCOUNTER — PROCEDURE VISIT (OUTPATIENT)
Dept: UROLOGY | Facility: MEDICAL CENTER | Age: 69
End: 2019-08-07
Payer: MEDICARE

## 2019-08-07 DIAGNOSIS — R33.9 RETENTION OF URINE: Primary | ICD-10-CM

## 2019-08-07 PROCEDURE — 51705 CHANGE OF BLADDER TUBE: CPT

## 2019-08-07 NOTE — PROGRESS NOTES
Cystostomy tube change     Date/Time 8/7/2019 1:40 PM     Performed by  Alyce Banegas by Megan Birmingham MD      Universal Protocol Consent: Verbal consent obtained  Consent given by: patient  Patient understanding: patient states understanding of the procedure being performed  Patient consent: the patient's understanding of the procedure matches consent given  Patient identity confirmed: verbally with patient        Preparation: Patient was prepped and draped in the usual sterile fashion  Site preparation: Chlorhexidine   Procedure Details   Procedure Notes:   Jim Kent  1950  [id number]      Patient presents for s/p tube change managed by Dr Belkis Cheung 4 weeks for next s/p tube change    Procedure: Suprapubic Tube Change   Pt brings her own supplies      Current catheter removed without difficulty after deflation of an intact balloon  Site prepped with Hibiclens  New 15O-53GN silicone s/p tube inserted using aseptic technique without incident  10 ml balloon inflated with sterile water  Patient tolerated well   Attached to drainage bag at pt's request       Marianne Oreilly  Patient tolerance: Patient tolerated the procedure well with no immediate complications

## 2019-09-04 ENCOUNTER — PROCEDURE VISIT (OUTPATIENT)
Dept: UROLOGY | Facility: MEDICAL CENTER | Age: 69
End: 2019-09-04
Payer: MEDICARE

## 2019-09-04 DIAGNOSIS — R33.9 RETENTION OF URINE: Primary | ICD-10-CM

## 2019-09-04 DIAGNOSIS — N30.90 CYSTITIS: ICD-10-CM

## 2019-09-04 PROCEDURE — 87077 CULTURE AEROBIC IDENTIFY: CPT | Performed by: UROLOGY

## 2019-09-04 PROCEDURE — 51710 CHANGE OF BLADDER TUBE: CPT

## 2019-09-04 PROCEDURE — 87086 URINE CULTURE/COLONY COUNT: CPT | Performed by: UROLOGY

## 2019-09-04 PROCEDURE — 87186 SC STD MICRODIL/AGAR DIL: CPT | Performed by: UROLOGY

## 2019-09-04 PROCEDURE — 99211 OFF/OP EST MAY X REQ PHY/QHP: CPT

## 2019-09-04 NOTE — PROGRESS NOTES
Cystostomy tube change     Date/Time 9/4/2019 3:26 PM     Performed by  Sagrario Farr by Dong Singh MD      Universal Protocol Consent: Verbal consent obtained  Consent given by: patient  Patient understanding: patient states understanding of the procedure being performed  Patient consent: the patient's understanding of the procedure matches consent given  Patient identity confirmed: verbally with patient        Preparation: Patient was prepped and draped in the usual sterile fashion  Site preparation: Chlorhexidine    Culture: yes   Procedure Details   Procedure Notes:   Donn Potts  1950  [id number]    Patient presents for s/p tube change managed by Dr Mason Pryor 4 weeks for next s/p tube change    Procedure: Suprapubic Tube Change   Pt brings her own supplies    Current s/p tube removed without difficulty after deflation of an intact balloon  Site prepped with Hibiclens  Extremely difficult s/p tube insertion  After repeated unsuccessful attempts, Dr Meera Abad was called in  After more repeated attempts, he successfully inserted a #[id number] all silicone s/p tube  10 ml balloon inflated with sterile water  Attached to catheter bag at pt's request  It was suggested again pt consider home care for s/p tube changes  Pt states Dr Geraline Goodell requested this office check her urine  Urine specimen sent to lab for u/c      [employer]

## 2019-09-06 ENCOUNTER — TELEPHONE (OUTPATIENT)
Dept: UROLOGY | Facility: MEDICAL CENTER | Age: 69
End: 2019-09-06

## 2019-09-06 LAB — BACTERIA UR CULT: ABNORMAL

## 2019-09-06 NOTE — TELEPHONE ENCOUNTER
Patient of Dr Nory Chakraborty seen in [facility] office  Procedure 9/5  Patient is requesting to have a VNA change catheter trial at her home  She can be reached at [phone]  If approved the order can be faxed to [phone] demario Mendoza    Thank you

## 2019-09-09 ENCOUNTER — TELEPHONE (OUTPATIENT)
Dept: UROLOGY | Facility: HOSPITAL | Age: 69
End: 2019-09-09

## 2019-09-09 DIAGNOSIS — N39.0 URINARY TRACT INFECTION WITHOUT HEMATURIA, SITE UNSPECIFIED: Primary | ICD-10-CM

## 2019-09-09 RX ORDER — CIPROFLOXACIN 500 MG/1
500 TABLET, FILM COATED ORAL EVERY 12 HOURS SCHEDULED
Qty: 10 TABLET | Refills: 0 | Status: SHIPPED | OUTPATIENT
Start: 2019-09-09 | End: 2019-09-14

## 2019-09-09 NOTE — TELEPHONE ENCOUNTER
----- Message from Pawel Don MD sent at 9/9/2019  8:38 AM EDT -----  Please let her know that Cipro was called into her pharmacy

## 2019-09-27 NOTE — TELEPHONE ENCOUNTER
Patient called to cancel appointment for 9/30/19  Patient advised that she is going to be trailing the [facility]

## 2019-09-27 NOTE — TELEPHONE ENCOUNTER
Written order from Dr Malgorzata Posey faxed to [facility] along with the written order from Dr Taryn Worthy from 4/9/19

## 2019-11-21 ENCOUNTER — NURSING HOME VISIT (OUTPATIENT)
Dept: GERIATRICS | Facility: OTHER | Age: 69
End: 2019-11-21
Payer: MEDICARE

## 2019-11-21 DIAGNOSIS — L89.304 PRESSURE INJURY OF BUTTOCK, STAGE 4, UNSPECIFIED LATERALITY (HCC): ICD-10-CM

## 2019-11-21 DIAGNOSIS — I82.409 DEEP VEIN THROMBOSIS (DVT) OF LOWER EXTREMITY, UNSPECIFIED CHRONICITY, UNSPECIFIED LATERALITY, UNSPECIFIED VEIN (HCC): ICD-10-CM

## 2019-11-21 DIAGNOSIS — M86.60 CHRONIC OSTEOMYELITIS (HCC): Primary | ICD-10-CM

## 2019-11-21 DIAGNOSIS — G40.909 SEIZURE DISORDER (HCC): ICD-10-CM

## 2019-11-21 DIAGNOSIS — N31.9 NEUROGENIC BLADDER: ICD-10-CM

## 2019-11-21 DIAGNOSIS — G20 PARKINSON'S DISEASE (HCC): ICD-10-CM

## 2019-11-21 DIAGNOSIS — K59.2 NEUROGENIC BOWEL: ICD-10-CM

## 2019-11-21 PROBLEM — N28.9 KIDNEY LESION, NATIVE, RIGHT: Status: ACTIVE | Noted: 2019-10-16

## 2019-11-21 PROBLEM — F32.A DEPRESSION: Status: ACTIVE | Noted: 2019-11-10

## 2019-11-21 PROBLEM — L89.94 STAGE 4 DECUBITUS ULCER (HCC): Status: ACTIVE | Noted: 2019-11-18

## 2019-11-21 PROBLEM — L89.96 PRESSURE INJURY OF DEEP TISSUE: Status: ACTIVE | Noted: 2019-11-10

## 2019-11-21 PROBLEM — G83.9 PARALYSIS (HCC): Status: ACTIVE | Noted: 2019-11-21

## 2019-11-21 PROBLEM — E87.1 HYPONATREMIA: Status: ACTIVE | Noted: 2019-11-16

## 2019-11-21 PROBLEM — D62 POSTOPERATIVE ANEMIA DUE TO ACUTE BLOOD LOSS: Status: ACTIVE | Noted: 2019-11-15

## 2019-11-21 PROCEDURE — 99305 1ST NF CARE MODERATE MDM 35: CPT | Performed by: INTERNAL MEDICINE

## 2019-11-21 NOTE — PROGRESS NOTES
[facility] [address] notes  SHORT TERM REHAB       NAME: Laina Teran  AGE: 77 y o  SEX: female    DATE OF ENCOUNTER: 11/21/2019    Assessment and Plan   Chronic osteomyelitis ([facility] )  Currently on IV ampicillin and po cipro both to be completed on 12/26/19  Continue monitoring blood works   Continue PICC line protocol  Patient is wanting to be [place] [facility] which is being worked on till it happens will continue to manage patient current condition     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[facility] )  Paralysis below the waist   Continue with supportive care  Rehab consult if patient stays    Parkinson's disease ([facility] )  Continue sinemet       Seizure disorder ([facility] )  Continue phenytoin     Stage 4 decubitus ulcer ([facility] )  Continue current wound care   Continue monitoring signs for worsening     DVT (deep venous thrombosis) ([facility] )  Hx of DVT and PE on coumadin  Currently getting 2mg po daily   INR done today was 2 5   Recommend checking again Monday if still here else at the new facility     Neurogenic bowel  Has been getting meds and also manual disimpaction   Continue current treatment     Neurogenic bladder  Chronic suprapubic cath   Continue monitoring out put         Chief Complaint     Wanting to be [place] [facility]  History of Present Illness     76 yo female seen for admission and discharge from the facility  Patient had arrived yesterday and has been insisting to be transferred to [facility] care  She was convinced to stay yesterday but today she does want to be still transferred  Staff is working on the transfer  Discussed with patient to get history  Patient was not giving much details  She would answer one word or few word answer  Such asked what happened  She stated she developed an wound that resulted in coming here  But had to ask more detailed questions such as are you on antibiotics and she would answer it   Instead of her giving the history of event that lead to her coming here without being sort of cued with other questions  Most of the details were obtained from the hospital records  As per records patient had developed stage 4 ulcer in the left ischium  The patient the developed osteomyelitis and now on IV and oral antibiotics  Patient was then transferred to [address] [place] [address] for therapy  Once she got here she wanted to go to [facility] and now there in the process of getting her transferred       PMHx     Past Medical History:   Diagnosis Date    Back pain     Congestive heart failure (HCC)     Depressive disorder     Hypertension     Neurogenic bladder     Open wound of leg     Osteoarthritis     Osteomyelitis (HCC)     Paraplegia (HCC)     Seizure ([facility] )     Thrombosis     Ulcer of ankle ([facility] )     Urinary retention      Past Surgical History:   Procedure Laterality Date    CYSTOSCOPY      SKIN GRAFT       Family History   Problem Relation Age of Onset    Diabetes Father     Kidney cancer Father     Alzheimer's disease Mother     Bone cancer Paternal Uncle     Lung cancer Paternal Uncle      Social History     Socioeconomic History    Marital status: /Civil Union     Spouse name: Not on file    Number of children: Not on file    Years of education: Not on file    Highest education level: Not on file   Occupational History    Not on file   Social Needs    Financial resource strain: Not on file    Food insecurity:     Worry: Not on file     Inability: Not on file    Transportation needs:     Medical: Not on file     Non-medical: Not on file   Tobacco Use    Smoking status: Never Smoker    Smokeless tobacco: Never Used   Substance and Sexual Activity    Alcohol use: No    Drug use: No    Sexual activity: Not on file   Lifestyle    Physical activity:     Days per week: Not on file     Minutes per session: Not on file    Stress: Not on file   Relationships    Social connections:     Talks on phone: Not on file     Gets together: Not on file Attends Hinduism service: Not on file     Active member of club or organization: Not on file     Attends meetings of clubs or organizations: Not on file     Relationship status: Not on file    Intimate partner violence:     Fear of current or ex partner: Not on file     Emotionally abused: Not on file     Physically abused: Not on file     Forced sexual activity: Not on file   Other Topics Concern    Not on file   Social History Narrative    Not on file     Allergies   Allergen Reactions    Latex     Other Other (See Comments)     Irritation  Latex gloves OK per pt  Review of Systems     Lower extremity paralysis  Constipation  Neurogenic bladder  All other review of system negative      Objective   Vital signs:  BP: 148/88  HR: 110  RR: 20  TEMP: 99 9F  SAT : 96%    PHYSICAL EXAM:  GENERAL: no acute distress  SKIN: warm, dry, no rash, no cyanosis  PICC line on the right arm  HEENT: normocephalic, atraumatic, no JVD, no Thyromegaly, no lymphadenopathy  LUNGS: CTA, no wheezing, no rales, expanded equally, no chest tenderness   HEART: normal rhythm, normal rate, systolic 2/6 murmur, no gallop  ABDOMEN: soft non tender non distended bs+ but sluggish, no guarding or rebound tenderness  :  Suprapubic cath present, no suprapubic tenderness  MUSCULOSKELETAL: strength about 4+/5 upper extremities with paraplegic, no calf tenderness, resting tremors of the upper extremities  NEUROLOGY: awake, alert, Ox3, able to recall 3/3 object  CN2-12 intact  PSYCH: cooperative, pleasant  Pertinent Laboratory/Diagnostic Studies:  Recent labs and diagnostic tests reviewed in nursing home EMR    Current Medications   Medications reviewed and signed off on nursing home EMR

## 2019-11-21 NOTE — ASSESSMENT & PLAN NOTE
Hx of DVT and PE on coumadin  Currently getting 2mg po daily   INR done today was 2 5   Recommend checking again Monday if still here else at the new facility

## 2020-01-09 ENCOUNTER — NURSING HOME VISIT (OUTPATIENT)
Dept: GERIATRICS | Facility: OTHER | Age: 70
End: 2020-01-09
Payer: MEDICARE

## 2020-01-09 VITALS
RESPIRATION RATE: 18 BRPM | DIASTOLIC BLOOD PRESSURE: 70 MMHG | SYSTOLIC BLOOD PRESSURE: 110 MMHG | TEMPERATURE: 97.8 F | OXYGEN SATURATION: 94 % | HEART RATE: 78 BPM

## 2020-01-09 DIAGNOSIS — G40.909 SEIZURE DISORDER (HCC): ICD-10-CM

## 2020-01-09 DIAGNOSIS — I10 ESSENTIAL HYPERTENSION: ICD-10-CM

## 2020-01-09 DIAGNOSIS — N31.9 NEUROGENIC BLADDER: ICD-10-CM

## 2020-01-09 DIAGNOSIS — L89.314 PRESSURE INJURY OF RIGHT BUTTOCK, STAGE 4 (HCC): Primary | ICD-10-CM

## 2020-01-09 DIAGNOSIS — E46 PROTEIN-CALORIE MALNUTRITION, UNSPECIFIED SEVERITY (HCC): ICD-10-CM

## 2020-01-09 DIAGNOSIS — I82.409 DEEP VEIN THROMBOSIS (DVT) OF LOWER EXTREMITY, UNSPECIFIED CHRONICITY, UNSPECIFIED LATERALITY, UNSPECIFIED VEIN (HCC): ICD-10-CM

## 2020-01-09 DIAGNOSIS — F32.89 OTHER DEPRESSION: ICD-10-CM

## 2020-01-09 DIAGNOSIS — G20 PARKINSON'S DISEASE (HCC): ICD-10-CM

## 2020-01-09 DIAGNOSIS — G82.20 PARAPLEGIA FOLLOWING SPINAL CORD INJURY (HCC): ICD-10-CM

## 2020-01-09 PROCEDURE — 99305 1ST NF CARE MODERATE MDM 35: CPT | Performed by: FAMILY MEDICINE
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
## 2020-01-09 NOTE — ASSESSMENT & PLAN NOTE
On chronic anticoagulation with coumadin    Recently supratherapeutic INR, will continue coumadin 6 mg daily and repeat INR in am

## 2020-01-09 NOTE — ASSESSMENT & PLAN NOTE
Will continue wound vac TTS, wound team will follow  Continue frequent repositioning and out of bed as tolerated  Completed course of antibiotics  Envella bed  Follow up with plastic surgery at

## 2020-01-09 NOTE — ASSESSMENT & PLAN NOTE
Patient's blood pressure is controlled, asymptomatic  Patient denies any side effects with medications  Continue same regimen and continue to monitor    Monitor BMP, lipid profile

## 2020-01-10 RX ORDER — OXYCODONE HYDROCHLORIDE 5 MG/1
5 TABLET ORAL EVERY 6 HOURS PRN
Qty: 15 TABLET | Refills: 0 | Status: SHIPPED | OUTPATIENT
Start: 2020-01-10

## 2020-01-14 ENCOUNTER — NURSING HOME VISIT (OUTPATIENT)
Dept: GERIATRICS | Facility: OTHER | Age: 70
End: 2020-01-14
Payer: MEDICARE

## 2020-01-14 DIAGNOSIS — N31.9 NEUROGENIC BLADDER: ICD-10-CM

## 2020-01-14 DIAGNOSIS — R26.2 AMBULATORY DYSFUNCTION: ICD-10-CM

## 2020-01-14 DIAGNOSIS — G40.909 SEIZURE DISORDER (HCC): ICD-10-CM

## 2020-01-14 DIAGNOSIS — I10 ESSENTIAL HYPERTENSION: Primary | ICD-10-CM

## 2020-01-14 PROCEDURE — 99308 SBSQ NF CARE LOW MDM 20: CPT | Performed by: NURSE PRACTITIONER
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
## 2020-01-14 NOTE — ASSESSMENT & PLAN NOTE
Blood pressures have been controlled  Blood pressures to be taken manually only  Continue amlodipine 10 mg daily with parameter  Continue atorvastatin 20 mg daily  Continue lisinopril 20 mg daily with parameters

## 2020-01-14 NOTE — ASSESSMENT & PLAN NOTE
There has been no seizure activity noted  Continue Dilantin 100 mg twice a day  Monitor Dilantin level  Monitor for seizure activity

## 2020-01-17 ENCOUNTER — NURSING HOME VISIT (OUTPATIENT)
Dept: GERIATRICS | Facility: OTHER | Age: 70
End: 2020-01-17
Payer: MEDICARE

## 2020-01-17 DIAGNOSIS — L89.314 PRESSURE INJURY OF RIGHT BUTTOCK, STAGE 4 (HCC): Primary | ICD-10-CM

## 2020-01-17 DIAGNOSIS — R26.2 AMBULATORY DYSFUNCTION: ICD-10-CM

## 2020-01-17 DIAGNOSIS — G89.29 CHRONIC RIGHT SHOULDER PAIN: ICD-10-CM

## 2020-01-17 DIAGNOSIS — M25.511 CHRONIC RIGHT SHOULDER PAIN: ICD-10-CM

## 2020-01-17 PROCEDURE — 99308 SBSQ NF CARE LOW MDM 20: CPT | Performed by: NURSE PRACTITIONER

## 2020-01-17 NOTE — ASSESSMENT & PLAN NOTE
Fall precautions  Patient requires Yuni Garcia for transfers and mobility  Therapist reports that patient is not cooperating in therapy as far as using a sliding board  He reports that patient is not progressing and that if she does not cooperate and progress by next week she is most likely going to be cut from services

## 2020-01-17 NOTE — PROGRESS NOTES
[facility]  [address]  [phone]  [address]    NAME: Fiona Fuller  AGE: 77 y o  SEX: female    DATE OF ENCOUNTER: 1/17/2020    Assessment and Plan     Pressure injury of right buttock, stage 4 (HCC)  Continue wound VAC  Follow-up wound team     Follow-up with plastic surgery at [facility]  Ambulatory dysfunction  Fall precautions  Patient requires Denzel Rojo for transfers and mobility  Therapist reports that patient is not cooperating in therapy as far as using a sliding board  He reports that patient is not progressing and that if she does not cooperate and progress by next week she is most likely going to be cut from services  Chronic right shoulder pain  Patient reports that her right shoulder only hurts if she lays on it for while or puts pressure on the shoulder  Continue oxycodone 5 mg every 6 hours as needed for moderate to severe pain  Continue Tylenol as needed  Chief Complaint     Follow-up Note     History of Present Illness     Patient was seen and examined at bedside  She is in stable condition and denies chest pain, shortness of breath, abdominal pain, nausea, vomiting, diarrhea, constipation, headache, dizziness  She states she continues to have right shoulder pain on and off which is chronic  At present she is experiencing no pain  Therapist reports the patient is making no progress in therapy  She is refusing to use the sliding board to transfer from bed to [facility]  Therapist reports that if patient does not make any progress or cooperate more by next week she will be cut from services  Patient was seen by plastic surgeon who stated that patient can sit on a retro cushion  wheelchair for 1 hour  Staff denies any new complaints  Review of Systems     ROS as per noted in HPI      Objective     Vitals:  Blood pressure 128/61, pulse 66, respirations 20, temperature 97 3°  Pulse ox 97% on room air     Physical Exam   Constitutional: She is oriented to person, place, and time  HENT:   Head: Normocephalic  Cardiovascular: Normal rate  Exam reveals no gallop and no friction rub  Murmur heard  Pulmonary/Chest: Effort normal and breath sounds normal  She has no wheezes  She has no rales  Abdominal: Soft  Bowel sounds are normal  There is no tenderness  Genitourinary:   Genitourinary Comments: Suprapubic catheter draining yellow urine  Musculoskeletal: She exhibits no edema  Foot drop prevention boots and ACE wraps on bilateral lower extremities  Neurological: She is alert and oriented to person, place, and time  Skin: Skin is warm and dry  Wound VAC intact on left ischial area  Nursing note and vitals reviewed  Current Medications   Medications were reviewed and updated in facility chart       SVETLANA Quezada  1/17/2020 11:53 AM

## 2020-01-17 NOTE — ASSESSMENT & PLAN NOTE
Patient reports that her right shoulder only hurts if she lays on it for while or puts pressure on the shoulder  Continue oxycodone 5 mg every 6 hours as needed for moderate to severe pain  Continue Tylenol as needed

## 2020-01-17 NOTE — ASSESSMENT & PLAN NOTE
Continue wound VAC  Follow-up wound team     Follow-up with plastic surgery at [facility]

## 2020-01-19 ENCOUNTER — TELEPHONE (OUTPATIENT)
Dept: OTHER | Facility: OTHER | Age: 70
End: 2020-01-19

## 2020-01-20 ENCOUNTER — NURSING HOME VISIT (OUTPATIENT)
Dept: GERIATRICS | Facility: OTHER | Age: 70
End: 2020-01-20
Payer: MEDICARE

## 2020-01-20 DIAGNOSIS — L89.314 PRESSURE INJURY OF RIGHT BUTTOCK, STAGE 4 (HCC): Primary | ICD-10-CM

## 2020-01-20 DIAGNOSIS — I10 ESSENTIAL HYPERTENSION: ICD-10-CM

## 2020-01-20 DIAGNOSIS — G82.20 PARAPLEGIA FOLLOWING SPINAL CORD INJURY (HCC): ICD-10-CM

## 2020-01-20 PROCEDURE — 99309 SBSQ NF CARE MODERATE MDM 30: CPT | Performed by: NURSE PRACTITIONER

## 2020-01-20 NOTE — ASSESSMENT & PLAN NOTE
Continue wound VAC  Wound care team to follow  Turn and reposition patient on a frequent basis  Obtain a recliner chair which will relieve pressure from her buttock and ischial areas  Switch out the 2 in [facility] to a 4 in [facility]  Follow-up with [facility] plastic surgery  Monitor for active bleeding from wound area

## 2020-01-20 NOTE — ASSESSMENT & PLAN NOTE
Continue amlodipine 10 mg daily with parameters    Continue atorvastatin 20 mg daily    Continue lisinopril 20 mg daily with parameters

## 2020-01-20 NOTE — PROGRESS NOTES
[facility]  Małach[address] 80  [phone]  [address]    NAME: Roman Route  AGE: 77 y o  SEX: female    DATE OF ENCOUNTER: 1/20/2020    Assessment and Plan     Pressure injury of right buttock, stage 4 (HCC)  Continue wound VAC  Wound care team to follow  Turn and reposition patient on a frequent basis  Obtain a recliner chair which will relieve pressure from her buttock and ischial areas  Switch out the 2 in [place] to a 4 in [place]  Follow-up with [facility] plastic surgery  Monitor for active bleeding from wound area  HTN (hypertension)  Continue amlodipine 10 mg daily with parameters    Continue atorvastatin 20 mg daily    Continue lisinopril 20 mg daily with parameters  Paraplegia following spinal cord injury [facility])  Turn and reposition patient on frequent basis  Therapy  Pain medicine as needed  Monitor for signs and symptoms of uncontrolled pain  Chief Complaint     Follow-up Note     History of Present Illness     Patient was seen and examined at bedside  She is in stable condition and denies chest pain, shortness of breath, abdominal pain, nausea, vomiting, diarrhea, headache, dizziness  She continues to have right shoulder pain on and off  When laying in bed and not moving on to that shoulder she has no pain  Nurse reports a moderate amount of blood was noted in buttock area but couldn't tell where it was coming from  Patient was tearful and stated she did not want get out the chair because of the pressure on her buttocks and her wound  She is requesting a recliner chair  She also stated that she needs 4 inch Roho not a 2 inch that she has presently  Patient is presently anticoagulated  Hypertension-there been no episodes of hypotension  Blood pressures appear to be controlled  Labs and medications reviewed  Review of Systems     ROS as per noted in HPI      Objective     Vitals:  Blood pressure 137/92, pulse 88, respirations 18, temperature 98 4°  Pulse ox 98% on room air  Physical Exam   Constitutional: She is oriented to person, place, and time  Cardiovascular: Normal rate  Exam reveals no gallop and no friction rub  Murmur heard  Pulmonary/Chest: Effort normal and breath sounds normal  She has no wheezes  She has no rales  Abdominal: Soft  Bowel sounds are normal  There is no tenderness  Genitourinary:   Genitourinary Comments: Suprapubic catheter intact  Neurological: She is alert and oriented to person, place, and time  Skin: Skin is warm and dry  Wound VAC intact left ischial area  Monitor the amount of blood noted in groin area  There was no active bleeding noted  There was no blood noted from the rectum or the vaginal area  Vitals reviewed  Current Medications   Medications were reviewed and updated in facility chart       Manus SVETLANA Fraser  1/20/2020 2:35 PM

## 2020-01-20 NOTE — ASSESSMENT & PLAN NOTE
Turn and reposition patient on frequent basis  Therapy  Pain medicine as needed  Monitor for signs and symptoms of uncontrolled pain

## 2020-01-27 ENCOUNTER — NURSING HOME VISIT (OUTPATIENT)
Dept: GERIATRICS | Facility: OTHER | Age: 70
End: 2020-01-27
Payer: MEDICARE

## 2020-01-27 DIAGNOSIS — L89.314 PRESSURE INJURY OF RIGHT BUTTOCK, STAGE 4 (HCC): Primary | ICD-10-CM

## 2020-01-27 DIAGNOSIS — R26.2 AMBULATORY DYSFUNCTION: ICD-10-CM

## 2020-01-27 PROCEDURE — 99308 SBSQ NF CARE LOW MDM 20: CPT | Performed by: NURSE PRACTITIONER

## 2020-01-27 NOTE — PROGRESS NOTES
[facility]  Małachlin[address] Johnston 80  [phone]  [facility]      NAME: Booker Gonzalez  AGE: 77 y o  SEX: female    DATE OF ENCOUNTER: 1/27/2020    Assessment and Plan     Pressure injury of right buttock, stage 4 (HCC)  Wound VAC intact  Patient is being followed by a [facility] surgery as well as do wound care team     She now has 4  Roho for her wheelchair  There has been no signs of active bleeding noted    Continue to turn and reposition patient are frequent basis  Continue to follow time restraints when out of bed  Wound care as ordered  Ambulatory dysfunction  Fall precautions  Wheelchair when out of bed  Therapy  Seizure disorder ([facility] [address] )  No seizure activity noted  Continue Dilantin 100 mg twice a day  Monitor Dilantin level  Chief Complaint     Follow-up Note     History of Present Illness     Patient seen and examined at bedside  She is in stable condition and denies chest pain, shortness of breath, abdominal pain, nausea, vomiting, diarrhea, constipation, headache, dizziness  She continues to have her right shoulder pain which is chronic  She is usually out of bed for short per to time and she watches the clock very closely and demands to be put to bed right on the exact minute  Labs and meds reviewed  Patient is being followed by wound care team   There have been no more episodes of blood noted in the groin area  Review of Systems     ROS as per noted in HPI  Objective     Vitals:  Unable to obtain blood pressure secondary to shaking and tremors  Pulse 97 respirations 18, temperature 98 9°  Pulse ox 95% on 3 L of O2 via nasal cannula  Physical Exam   Constitutional: She is oriented to person, place, and time  Cardiovascular: Normal rate  Exam reveals no gallop and no friction rub  Murmur heard  Pulmonary/Chest: Effort normal  She has no wheezes  She has no rales  Abdominal: Soft   Bowel sounds are normal  There is no tenderness  There is no guarding  Neurological: She is alert and oriented to person, place, and time  Skin: Skin is warm and dry  Wound VAC intact on left ischial area  Nursing note and vitals reviewed  Current Medications   Medications were reviewed and updated in facility chart       SVETLANA Mccall  1/27/2020 5:10 PM

## 2020-01-27 NOTE — ASSESSMENT & PLAN NOTE
Wound VAC intact  Patient is being followed by a [facility] surgery as well as do wound care team     She now has 4  Roho for her wheelchair  There has been no signs of active bleeding noted    Continue to turn and reposition patient are frequent basis  Continue to follow time restraints when out of bed  Wound care as ordered

## 2020-01-28 ENCOUNTER — NURSING HOME VISIT (OUTPATIENT)
Dept: GERIATRICS | Facility: OTHER | Age: 70
End: 2020-01-28
Payer: MEDICARE

## 2020-01-28 DIAGNOSIS — L03.116 CELLULITIS OF LEFT LOWER EXTREMITY: Primary | ICD-10-CM

## 2020-01-28 PROBLEM — L03.90 CELLULITIS: Status: ACTIVE | Noted: 2020-01-28

## 2020-01-28 PROCEDURE — 99308 SBSQ NF CARE LOW MDM 20: CPT | Performed by: FAMILY MEDICINE

## 2020-01-29 NOTE — ASSESSMENT & PLAN NOTE
Cellulitis of left lateral thigh  Marked the erythematous area with a marker and will monitor  Check area q 4 hours  Check VS q shift  Started on keflex

## 2020-02-03 ENCOUNTER — NURSING HOME VISIT (OUTPATIENT)
Dept: GERIATRICS | Facility: OTHER | Age: 70
End: 2020-02-03
Payer: MEDICARE

## 2020-02-03 ENCOUNTER — TELEPHONE (OUTPATIENT)
Dept: UROLOGY | Facility: MEDICAL CENTER | Age: 70
End: 2020-02-03

## 2020-02-03 DIAGNOSIS — L03.116 CELLULITIS OF LEFT LOWER EXTREMITY: ICD-10-CM

## 2020-02-03 DIAGNOSIS — R26.2 AMBULATORY DYSFUNCTION: ICD-10-CM

## 2020-02-03 DIAGNOSIS — I82.409 DEEP VEIN THROMBOSIS (DVT) OF LOWER EXTREMITY, UNSPECIFIED CHRONICITY, UNSPECIFIED LATERALITY, UNSPECIFIED VEIN (HCC): ICD-10-CM

## 2020-02-03 DIAGNOSIS — I10 ESSENTIAL HYPERTENSION: ICD-10-CM

## 2020-02-03 DIAGNOSIS — L89.314 PRESSURE INJURY OF RIGHT BUTTOCK, STAGE 4 (HCC): Primary | ICD-10-CM

## 2020-02-03 DIAGNOSIS — G20 PARKINSON'S DISEASE (HCC): ICD-10-CM

## 2020-02-03 DIAGNOSIS — G82.20 PARAPLEGIA FOLLOWING SPINAL CORD INJURY (HCC): ICD-10-CM

## 2020-02-03 DIAGNOSIS — G40.909 SEIZURE DISORDER (HCC): ICD-10-CM

## 2020-02-03 PROCEDURE — 99309 SBSQ NF CARE MODERATE MDM 30: CPT | Performed by: NURSE PRACTITIONER

## 2020-02-03 NOTE — ASSESSMENT & PLAN NOTE
BP range (2/1-3/2020) = 123/80 to 142/70  Goal: < 140 to 150/90  Continue the following anti-HTN meds:  * Amlodipine 10mg daily - with HOLD parameter  * Lisinopril 20mg daily - with HOLD parameter  Stable renal functions

## 2020-02-03 NOTE — TELEPHONE ENCOUNTER
Call placed to Mitzi Donis, RN at [facility], in regards to suprapubic catheter change  Mitzi Donis advised that patient does not wish to come to our office for tube change, but according to Mitzi Donis they do not change tubes there  Asked Mitzi Donis if we can please speak to Director of Nursing in regards to this issue  Message left for DON and awaiting call back

## 2020-02-03 NOTE — PROGRESS NOTES
Progress Note    Location: [facility]  POS: 31 ([facility])    Assessment/Plan:    Pressure injury of right buttock, stage 4 (HCC)  Followed by facility Wound care team weekly  Continue wound care recommendations e g  Leanne Dawson  Continue MVI daily  Continue Micha packet daily  Continue Low air loss mattress  Cellulitis  Resolving  Continue Keflex BID until 2/4/2020  VSS  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[facility] )  Stable  Chronic constipation  Will add Miralax 17G daily - HOLD for loose stools  Increase Senna to  2 tablets at bedtime  Increase Colace to BID  Continue 24/7 [facility] supportive care    Parkinson's disease ([facility] )  Stable  Hand tremors significant  Bed bound since admission to facility  Continue Sinemet QID    Ambulatory dysfunction  Continue 24/7 [facility] supportive care and management  Continue PT/OT/ST as scheduled  Seizure disorder ([place] [address] )  No seizure episode since admission to facility  Continue Phenytoin BIDS    HTN (hypertension)  BP range (2/1-3/2020) = 123/80 to 142/70  Goal: < 140 to 150/90  Continue the following anti-HTN meds:  * Amlodipine 10mg daily - with HOLD parameter  * Lisinopril 20mg daily - with HOLD parameter  Stable renal functions  DVT (deep venous thrombosis) ([facility])  INR therapeutic as of 1/27/2020  Continue Coumadin daily  Next PT/INR: 2/4/2020      Chief complaint / Reason for visit: Follow-up visit    History of Present Illness: This is a 77 y o  Female patient currently admitted at [facility] (1/9/2020 to present) following discharge from acute care hospitalization ([facility]) with Dx of Non healing Stage 4 Pressure Ulcer to Right Ischium, osteomyelitis, Parkinson's Disease  Patient is seen and examined today to follow-up acute and chronic medical conditions mentioned above and Seizure Disorder, Chronic anti-coagualtion due to Hx of DVT, HTN, Constipation and deconditioning and ambulatory dysfunction      Patient is bed bound - to be OOB 1 hour daily only, alert, cooperative and NAD  Patient reported abdominal distention but no pain on this visit - otherwise no other acute medical concerns for this visit - per nursing, no acute medical concerns as well  V/S: T98 2F -P75 -R18 BP: 123/80 SpO2: 95% RA  On examination, Abdomen slightly distended, Hypoactive on the Right side but hyperactive on the Left Lower quadrant, rectal area budging - manual evacuation of stools done: Large hard formed stools  Per patient she is unable to feel the urge or insensate to rectal pressure  Will adjust bowel program medication - otherwise all other examination result benign  Reviewed labs available in medical chart: Isolated elevation of Alk  Phosphatase otherwise hepatic functions WNL, Renal functions WNL, Slightly low Alb and TPro levels, Mild anemia, Leukopenia and INR at therapeutic level  Patient presented to be medically stable for this visit  Review of Systems:  Per history of present illness, all other systems reviewed and negative  HISTORY:  Medical Hx: Reviewed, unchanged  Family Hx: Reviewed, unchanged  Soc Hx: Reviewed,  Unchanged    ALLERGY: Reviewed, unchanged   Allergies   Allergen Reactions    Latex     Other Other (See Comments)     Irritation  Latex gloves OK per pt  PHYSICAL EXAM:  Vital Signs: T98 2F -P75 -R18 BP: 123/80 SpO2: 95% RA  Physical Exam   Constitutional: She is oriented to person, place, and time  She appears well-developed and well-nourished  No distress  Alert, verbal, cooperative  HENT:   Head: Normocephalic and atraumatic  Right Ear: External ear normal    Left Ear: External ear normal    Mouth/Throat: Oropharynx is clear and moist  No oropharyngeal exudate  Eyes: Pupils are equal, round, and reactive to light  Conjunctivae are normal  Right eye exhibits no discharge  Left eye exhibits no discharge  No scleral icterus  Neck: Neck supple  No JVD present  No tracheal deviation present  No thyromegaly present     Cardiovascular: Normal rate, regular rhythm and normal heart sounds  Exam reveals no gallop and no friction rub  No murmur heard  Pulmonary/Chest: Effort normal and breath sounds normal  No stridor  No respiratory distress  She has no wheezes  She has no rales  She exhibits no tenderness  Abdominal: Soft  She exhibits distension  She exhibits no mass  There is no tenderness  There is no rebound and no guarding  Large hard stools in rectum - manual evacuation done   Genitourinary:   Genitourinary Comments: SP catheter in place - urine in bag  Musculoskeletal: She exhibits no edema, tenderness or deformity  Non-ambulatory  Lymphadenopathy:     She has no cervical adenopathy  Neurological: She is alert and oriented to person, place, and time  Tremors to both hands  Skin: Skin is warm and dry  No rash noted  She is not diaphoretic  There is erythema  No pallor  Chronic Stage 4 pressure ulcer to ischial area - Wound VAC in place  Psychiatric: She has a normal mood and affect  Her behavior is normal  Thought content normal        Laboratory results / Imaging: Hard copies in medical chart:    * CMP (2020) = WNL except:  Crea: 0 16 (L)  Alk  [place]: 129 (H)  Alb: 2 7 (L)  TPro: 6 1 (L)    * CBC w/o diff (2020) = WNL except:  Hb 0 (L)  Hct: 33 3 (L)  MPV: 7 2 (L)    * PT/INR: 29 5 / 2 7 (2020)    Current Medications:   All medications reviewed and updated in [address] [place], [place] [address]DOMINIQUE  2/3/2020

## 2020-02-03 NOTE — TELEPHONE ENCOUNTER
Patient of DR Robert Mata seen at the [facility] office  Patient is currently at [facility] for [address] call from [facility] in regard to need for suprapubic catheter change  Lazarus Dalton at [facility] can be reached at [phone]  Thank you

## 2020-02-03 NOTE — ASSESSMENT & PLAN NOTE
Stable    Chronic constipation  Will add Miralax 17G daily - HOLD for loose stools  Increase Senna to  2 tablets at bedtime  Increase Colace to BID  Continue 24/7 [facility] supportive care

## 2020-02-03 NOTE — ASSESSMENT & PLAN NOTE
Followed by facility Wound care team weekly  Continue wound care recommendations e g  VAC  Continue MVI daily  Continue Micha packet daily  Continue Low air loss mattress

## 2020-02-04 NOTE — TELEPHONE ENCOUNTER
Call placed to Jocelyn Heller at [facility] where patient is currently residing and she informed office that a nurse at the facility will change the suprapubic catheter for the patient and if they have any questions or concerns they will reach out to the office

## 2020-02-06 ENCOUNTER — NURSING HOME VISIT (OUTPATIENT)
Dept: GERIATRICS | Facility: OTHER | Age: 70
End: 2020-02-06
Payer: MEDICARE

## 2020-02-06 DIAGNOSIS — L89.314 PRESSURE INJURY OF RIGHT BUTTOCK, STAGE 4 (HCC): Primary | ICD-10-CM

## 2020-02-06 DIAGNOSIS — I10 ESSENTIAL HYPERTENSION: ICD-10-CM

## 2020-02-06 DIAGNOSIS — G82.20 PARAPLEGIA FOLLOWING SPINAL CORD INJURY (HCC): ICD-10-CM

## 2020-02-06 DIAGNOSIS — L03.116 CELLULITIS OF LEFT LOWER EXTREMITY: ICD-10-CM

## 2020-02-06 DIAGNOSIS — I82.409 DEEP VEIN THROMBOSIS (DVT) OF LOWER EXTREMITY, UNSPECIFIED CHRONICITY, UNSPECIFIED LATERALITY, UNSPECIFIED VEIN (HCC): ICD-10-CM

## 2020-02-06 DIAGNOSIS — G20 PARKINSON'S DISEASE (HCC): ICD-10-CM

## 2020-02-06 DIAGNOSIS — G40.909 SEIZURE DISORDER (HCC): ICD-10-CM

## 2020-02-06 PROCEDURE — 99309 SBSQ NF CARE MODERATE MDM 30: CPT | Performed by: NURSE PRACTITIONER

## 2020-02-06 NOTE — ASSESSMENT & PLAN NOTE
Resolved on this visit  Completed Keflex  Continue Prevalon boots while in bed  Continue Low air loss mattress for pressure relief

## 2020-02-06 NOTE — ASSESSMENT & PLAN NOTE
Non ambulatory and bed bound    Continue 24/7 [facility] supportive care and management  Continue PT/OT/ST as scheduled  Continue bowel regimen to prevent impaction:  * Senna 2 tabs at bedtime  * Colace 100mg BID  * Miralax 17G daily  Continue low air loss mattress for pressure relief  Nursing to assess for pain

## 2020-02-06 NOTE — ASSESSMENT & PLAN NOTE
Stable and controlled  Ave SBP: 134  Goal: <140 to 150/90  Continue Amlodipine 10mg daily - with HOLD parameter  Continue Lisinopril 20mg daily - with HOLD parameter  Continue Fish Oil supplement  Continue Atorvastatin 20mg daily

## 2020-02-06 NOTE — PROGRESS NOTES
Progress Note    Location: [facility]  POS: 31 ([facility])    Assessment/Plan:    Pressure injury of right buttock, stage 4 ([facility])  Stable  Wound VAC in place  Followed by [facility] Plastic Surgery Office  Last visit: 25/2020  Continue recommended wound care treatment and dressing changes  Continue Low air loss mattress  Nursing to schedule regular turning schedule while in bed  Continue weekly wound care team assessment  Continue supplements:  * Micha daily  * Biotin daily  * MVI  Daily  * Vit B12 daily  CBC w/o diff and CMP on 2/10/2020    Cellulitis  Resolved on this visit  Completed Keflex  Continue Prevalon boots while in bed  Continue Low air loss mattress for pressure relief  Parkinson's disease ([facility] )  Stable  Significant hand tremors  Followed by Neurology  Last visit June 2019  Continue Sinemet QID    DVT (deep venous thrombosis) ([facility])  Therapeutic INR: 2 3 (2/6/2020)  Continue Coumadin 7mg daily  Next PT/INR: 2/13/2020  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[facility] [address] )  Non ambulatory and bed bound  Continue 24/7 [facility] supportive care and management  Continue PT/OT/ST as scheduled  Continue bowel regimen to prevent impaction:  * Senna 2 tabs at bedtime  * Colace 100mg BID  * Miralax 17G daily  Continue low air loss mattress for pressure relief  Nursing to assess for pain    HTN (hypertension)  Stable and controlled  Ave SBP: 134  Goal: <140 to 150/90  Continue Amlodipine 10mg daily - with HOLD parameter  Continue Lisinopril 20mg daily - with HOLD parameter  Continue Fish Oil supplement  Continue Atorvastatin 20mg daily    Seizure disorder ([facility])  Seizure free since admission to facility  Continue Phenytoin 100mg BID  Followed Neurology  Last visit: June 2019  Chief complaint / Reason for visit: Follow-up visit    History of Present Illness: This is a 77 y o   Female patient currently admitted at [facility] (1/9/2020 to present) following discharge from acute care hospitalization ([facility]) with Dx of Non healing Stage 4 Pressure Ulcer to Right Ischium, osteomyelitis, Parkinson's Disease      Patient is seen and examined today to follow-up acute and chronic medical conditions mentioned above and Seizure Disorder, Chronic anti-coagualtion due to Hx of DVT, HTN, Constipation and deconditioning and ambulatory dysfunction      Patient is bed bound - to be OOB 1 hour daily only, alert, cooperative and NAD  Patient denies any acute medical concerns for this visit - including no new or worsening pain, chest pain, SOB, GI/ related concerns, malaise, fatigue, cough/colds symptoms, dizziness/vertigo, headaches  Per nursing report, patient have slightly improved in compliance to scheduled digital rectal stimulation - to facilitate stool movement  Per nursing, patient often does not like to turn to her side as it is uncomfortable for her and often tells staff to come back later - patient denies report and blames staff back that she does not get turned as needed  Patient presents to me as slightly defensive and wary but otherwise cooperative  V/S: T98 1F -P82 -R18 BP: 132/64 SpO2: 97% RA       On examination, findings benign: Abdomen not distended and soft to palpation - no tension - patient also reported feeling better  NO signs of cellulitis to BLE since last visit  Reviewed consult summary from plastic Surgical office (2/5/2020) - wound deemed healing well and no signs of infection - stressed importance of office follow-up and consistent scheduled Q2 hours to reduce pressure and promote healing to pressure ulcers - no change in wound care and treatment       Reviewed labs (PT/INR) today: 2 3 (therapeutic)  Review of Systems:  Per history of present illness, all other systems reviewed and negative      HISTORY:  Medical Hx: Reviewed, unchanged  Family Hx: Reviewed, unchanged  Soc Hx: Reviewed,  Unchanged    ALLERGY: Reviewed, unchanged  Allergies   Allergen Reactions    Latex     Other Other (See Comments) Irritation  Latex gloves OK per pt  PHYSICAL EXAM:  Vital Signs: T98 1F -P82 -R18 BP: 132/64 SpO2: 97% RA  Weight: 207 5 lbs (2/5/2020)     Physical Exam   Constitutional: She is oriented to person, place, and time  She appears well-developed and well-nourished  No distress  Alert, cooperative  HENT:   Head: Normocephalic and atraumatic  Right Ear: External ear normal    Left Ear: External ear normal    Nose: Nose normal    Mouth/Throat: Oropharynx is clear and moist  No oropharyngeal exudate  Eyes: Pupils are equal, round, and reactive to light  Conjunctivae and EOM are normal  Right eye exhibits no discharge  Left eye exhibits no discharge  No scleral icterus  Neck: Normal range of motion  Neck supple  No JVD present  No tracheal deviation present  No thyromegaly present  Cardiovascular: Normal rate and regular rhythm  Exam reveals no gallop and no friction rub  Murmur heard  Pulmonary/Chest: Effort normal and breath sounds normal  No stridor  No respiratory distress  She has no wheezes  She has no rales  She exhibits no tenderness  Abdominal: Soft  Bowel sounds are normal  She exhibits no distension and no mass  There is no tenderness  There is no rebound and no guarding  No hernia  Protuberant but soft, BS x4 Q   Genitourinary: No vaginal discharge found  Genitourinary Comments: SP catheter in place - clear light yellow catheter  Musculoskeletal: She exhibits edema  She exhibits no tenderness or deformity  Non- Ambulatory  Bed bound for now due to multiple pressure to ischial areas - wound VAC in place  Lymphadenopathy:     She has no cervical adenopathy  Neurological: She is alert and oriented to person, place, and time  Noted significant tremors to hands   Skin: Skin is warm and dry  Mycotic toenails  No rash noted  She is not diaphoretic  No erythema  No pallor  Psychiatric: She has a normal mood and affect   Her behavior is normal  Thought content normal  Laboratory results / Imaging: Hard copies in medical chart:    * PT/INR (2/6/2020): 2 3 (therapeutic)    Current Medications:   All medications reviewed and updated in [address], CRNP  2/6/2020

## 2020-02-06 NOTE — ASSESSMENT & PLAN NOTE
Stable  Wound VAC in place  Followed by [facility] Plastic Surgery Office  Last visit: 25/2020  Continue recommended wound care treatment and dressing changes  Continue Low air loss mattress  Nursing to schedule regular turning schedule while in bed    Continue weekly wound care team assessment  Continue supplements:  * Micha daily  * Biotin daily  * MVI  Daily  * Vit B12 daily  CBC w/o diff and CMP on 2/10/2020

## 2020-02-06 NOTE — ASSESSMENT & PLAN NOTE
Seizure free since admission to facility  Continue Phenytoin 100mg BID  Followed Neurology  Last visit: June 2019

## 2020-02-06 NOTE — ASSESSMENT & PLAN NOTE
Stable  Significant hand tremors  Followed by Neurology  Last visit June 2019    Continue Sinemet QID

## 2020-02-17 ENCOUNTER — NURSING HOME VISIT (OUTPATIENT)
Dept: GERIATRICS | Facility: OTHER | Age: 70
End: 2020-02-17
Payer: MEDICARE

## 2020-02-17 ENCOUNTER — NURSING HOME VISIT (OUTPATIENT)
Dept: GERIATRICS | Facility: OTHER | Age: 70
End: 2020-02-17

## 2020-02-17 DIAGNOSIS — L89.314 PRESSURE INJURY OF RIGHT BUTTOCK, STAGE 4 (HCC): ICD-10-CM

## 2020-02-17 DIAGNOSIS — L03.116 CELLULITIS OF LEFT LOWER EXTREMITY: Primary | ICD-10-CM

## 2020-02-17 PROCEDURE — 99309 SBSQ NF CARE MODERATE MDM 30: CPT | Performed by: NURSE PRACTITIONER

## 2020-02-17 NOTE — ASSESSMENT & PLAN NOTE
Stable  Wound VAC in place  Followed by [facility] Plastic Surgery Office  Last visit: 25/2020  Continue recommended wound care treatment and dressing changes  Continue Low air loss mattress  Nursing to schedule regular turning schedule while in bed    Continue weekly wound care team assessment  Continue supplements:  * Micha daily  * Biotin daily  * MVI  Daily  * Vit B12 daily

## 2020-02-17 NOTE — PROGRESS NOTES
Progress Note    Location: [facility]  POS: 32 (LT)    Assessment/Plan:    Cellulitis  Recurrent  Start Keflex 500mg TID x 7 days  BMP and CBC w/o diff on 2/20/2020    Pressure injury of right buttock, stage 4 (HCC)  Stable  Wound VAC in place  Followed by [facility] Plastic Surgery Office  Last visit: 25/2020  Continue recommended wound care treatment and dressing changes  Continue Low air loss mattress  Nursing to schedule regular turning schedule while in bed  Continue weekly wound care team assessment  Continue supplements:  * Micha daily  * Biotin daily  * MVI  Daily  * Vit B12 daily      Chief complaint / Reason for visit: Acute visit    History of Present Illness: This is a 77 y o  Female patient recently transitioned from [employer] to [facility] management since last week with Dx of Non healing Stage 4 Pressure Ulcer to Right Ischium, osteomyelitis, Parkinson's Disease      Patient is seen and examined today as an acute visit per nursing request for redness to Left lateral thigh area  Review of prior progress notes since admission to facility, patient has been treated empirically for Left thigh cellulitis - same location  On assessment patient in bed, alert, cooperative at baseline orientation  Denies any discomfort - reported being insensate from waist down due to spinal cord injury - noted light pink erythematous area to Left lateral outer mid third of thigh, (+) slight induration to touch - nursing outlined border to monitor for extension  Will order Keflex 500mg TID x 7 days and Labs  No other medical concerns for this visit  T 99 6F BP: 124/71    Review of Systems:  Per history of present illness, all other systems reviewed and negative  HISTORY:  Medical Hx: Reviewed, unchanged  Family Hx: Reviewed, unchanged  Soc Hx: Reviewed,  Unchanged    ALLERGY: Reviewed, unchanged  Allergies   Allergen Reactions    Latex     Other Other (See Comments)     Irritation  Latex gloves OK per pt         PHYSICAL EXAM:  Vital Signs: T99 6F -BP: 124/71      Physical Exam   Constitutional: She is oriented to person, place, and time  She appears well-developed and well-nourished  No distress  HENT:   Head: Normocephalic and atraumatic  Right Ear: External ear normal    Left Ear: External ear normal    Nose: Nose normal    Mouth/Throat: Oropharynx is clear and moist  No oropharyngeal exudate  Eyes: Pupils are equal, round, and reactive to light  Conjunctivae are normal  Right eye exhibits no discharge  Left eye exhibits no discharge  No scleral icterus  Neck: Neck supple  No tracheal deviation present  No thyromegaly present  Cardiovascular: Normal rate, regular rhythm and normal heart sounds  Exam reveals no gallop and no friction rub  No murmur heard  Pulmonary/Chest: Effort normal and breath sounds normal  No stridor  No respiratory distress  She has no wheezes  She has no rales  She exhibits no tenderness  Abdominal: Soft  Bowel sounds are normal  She exhibits no distension and no mass  There is no tenderness  There is no rebound and no guarding  Genitourinary:   Genitourinary Comments: SP catheter in place  Musculoskeletal: She exhibits no edema, tenderness or deformity  Non ambulatory  Bed bound for now due to multiple pressure to ischial areas - wound VAC in place  Lymphadenopathy:     She has no cervical adenopathy  Neurological: She is alert and oriented to person, place, and time  Skin: Skin is warm and dry  Rash noted  She is not diaphoretic  There is erythema  noted light pink erythematous area to Left lateral outer mid third of thigh, (+) slight induration to touch  Chronic wound to Right ischium: pressure ulcer otherwise the rest of skin intact  Psychiatric: She has a normal mood and affect  Her behavior is normal  Thought content normal        Laboratory results / Imaging: No new labs to review at this time  Current Medications:   All medications reviewed and updated in Nursing Home Chart    [address] [facility]  2/17/2020

## 2020-03-10 ENCOUNTER — NURSING HOME VISIT (OUTPATIENT)
Dept: GERIATRICS | Facility: OTHER | Age: 70
End: 2020-03-10
Payer: MEDICARE

## 2020-03-10 VITALS
DIASTOLIC BLOOD PRESSURE: 64 MMHG | SYSTOLIC BLOOD PRESSURE: 128 MMHG | WEIGHT: 210 LBS | BODY MASS INDEX: 33.89 KG/M2 | RESPIRATION RATE: 14 BRPM

## 2020-03-10 DIAGNOSIS — I50.9 CHRONIC CONGESTIVE HEART FAILURE, UNSPECIFIED HEART FAILURE TYPE (HCC): ICD-10-CM

## 2020-03-10 DIAGNOSIS — L03.115 CELLULITIS OF RIGHT LOWER EXTREMITY: Primary | ICD-10-CM

## 2020-03-10 PROCEDURE — 99309 SBSQ NF CARE MODERATE MDM 30: CPT | Performed by: FAMILY MEDICINE

## 2020-03-10 NOTE — PROGRESS NOTES
[facility]  [address]  [phone]  [facility]      NAME: Tima Borges  AGE: 77 y o  SEX: female [phone]    DATE OF ENCOUNTER: 3/10/2020    Assessment and Plan     Cellulitis  Previously treated with Keflex 3 weeks ago, with good resolution, now with eruption on right lateral knee with blisters on left lateral knee, and suprapubic abdomen  Cellulitis vs Drug eruption  Patient is on Phenytoin, however SAYDA drug eruption algorithm score 0, very unlikely, however Phenytoin is highly associated with SJS, Dr Lopez Mode to reach out to Neurology on changing AED  Agree with Keflex 500 mg tid x 7 days  CBC, CMP    CHF (congestive heart failure) ([facility])  Wt Readings from Last 3 Encounters:   03/10/20 95 3 kg (210 lb)   05/16/19 97 9 kg (215 lb 13 3 oz)   05/03/19 86 2 kg (190 lb)     Last EF 60% in 2017  Now with 3-4+ pitting edema BL lower extremities and abdominal distention  Will get 2D ECHO and BNP  Chief Complaint     Follow up "I have a rash"      History of Present Illness     Tima Borges is a 77 y o  female who was seen today for rash and lower extremity swelling  Patient states she had a similar rash 3-4 weeks ago  She is unaware of how long this rash has been present  She is also complaining of fullness in her legs BL  The following portions of the patient's history were reviewed and updated as appropriate: allergies, current medications, past family history, past medical history, past social history, past surgical history and problem list     Review of Systems     Review of Systems   Constitutional: Negative for chills and fever  HENT: Negative for ear pain and sore throat  Respiratory: Negative for cough and shortness of breath  Cardiovascular: Positive for leg swelling  Negative for chest pain and palpitations  Gastrointestinal: Negative for abdominal pain, diarrhea and nausea  Skin: Positive for rash     Neurological: Negative for dizziness and headaches  Active Problem List     Patient Active Problem List   Diagnosis    Neurogenic bladder    Chronic osteomyelitis ([facility])    Depression    DVT (deep venous thrombosis) ([facility])    Heart murmur    HTN (hypertension)    Hyperlipidemia    Hyponatremia    Pressure injury of right buttock, stage 4 ([facility])    Kidney lesion, native, right    Neurogenic bowel    Paralysis ([address]    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[facility] )    Parkinson's disease ([address] )    Postoperative anemia due to acute blood loss    Pressure injury of deep tissue    Seizure disorder ([facility])    Protein-calorie malnutrition ([facility])    Ambulatory dysfunction    Chronic right shoulder pain    Cellulitis    CHF (congestive heart failure) ([facility])       Objective     Vital Signs:     Vitals:    03/10/20 1435   BP: 128/64   Resp: 14         Physical Exam   Constitutional: She is oriented to person, place, and time  She appears well-developed and well-nourished  HENT:   Head: Normocephalic and atraumatic  Right Ear: External ear normal    Left Ear: External ear normal    Nose: Nose normal    Mouth/Throat: Oropharynx is clear and moist    Eyes: Pupils are equal, round, and reactive to light  Conjunctivae and EOM are normal    Neck: Normal range of motion  Neck supple  Cardiovascular: Normal rate and regular rhythm  Murmur heard  Pulmonary/Chest: Effort normal and breath sounds normal    Abdominal: Soft  Bowel sounds are normal  She exhibits distension  There is no tenderness  There is no rebound and no guarding  Musculoskeletal: Normal range of motion  She exhibits edema (3-4+ BL LE)  She exhibits no tenderness  Neurological: She is alert and oriented to person, place, and time  Skin: Skin is warm and dry  Rash (Erythema right lateral knee, blistering left left lateral knee, 2x blood filled blisters suprapubic abdomen ) noted  Psychiatric: She has a normal mood and affect   Her behavior is normal  Pertinent Laboratory/Diagnostic Studies:  Laboratory and Imaging studies reviewed  Full report in the paper chart  Current Medications   Medications reviewed and updated in facility chart      Name: Tram Vitale  : 1950  MRN: [id number]  DOS: 3/10/2020    Rich Salazar MD  3/10/2020 3:00 PM

## 2020-03-10 NOTE — ASSESSMENT & PLAN NOTE
Wt Readings from Last 3 Encounters:   03/10/20 95 3 kg (210 lb)   05/16/19 97 9 kg (215 lb 13 3 oz)   05/03/19 86 2 kg (190 lb)     Last EF 60% in 2017  Now with 3-4+ pitting edema BL lower extremities and abdominal distention  Will get 2D ECHO and BNP

## 2020-03-10 NOTE — ASSESSMENT & PLAN NOTE
Previously treated with Keflex 3 weeks ago, with good resolution, now with eruption on right lateral knee with blisters on left lateral knee, and suprapubic abdomen  Cellulitis vs Drug eruption  Patient is on Phenytoin, however SAYDA drug eruption algorithm score 0, very unlikely, however Phenytoin is highly associated with SJS, Dr Johnston Abts to reach out to Neurology on changing AED  Agree with Keflex 500 mg tid x 7 days    CBC, CMP

## 2020-05-14 ENCOUNTER — NURSING HOME VISIT (OUTPATIENT)
Dept: GERIATRICS | Facility: REHABILITATION | Age: 70
End: 2020-05-14
Payer: MEDICARE

## 2020-05-14 DIAGNOSIS — I82.409 DEEP VEIN THROMBOSIS (DVT) OF LOWER EXTREMITY, UNSPECIFIED CHRONICITY, UNSPECIFIED LATERALITY, UNSPECIFIED VEIN (HCC): ICD-10-CM

## 2020-05-14 DIAGNOSIS — I10 ESSENTIAL HYPERTENSION: Primary | ICD-10-CM

## 2020-05-14 DIAGNOSIS — G20 PARKINSON'S DISEASE (HCC): ICD-10-CM

## 2020-05-14 DIAGNOSIS — G82.20 PARAPLEGIA FOLLOWING SPINAL CORD INJURY (HCC): ICD-10-CM

## 2020-05-14 DIAGNOSIS — L89.314 PRESSURE INJURY OF RIGHT BUTTOCK, STAGE 4 (HCC): ICD-10-CM

## 2020-05-14 DIAGNOSIS — I50.9 CHRONIC CONGESTIVE HEART FAILURE, UNSPECIFIED HEART FAILURE TYPE (HCC): ICD-10-CM

## 2020-05-14 PROCEDURE — 99309 SBSQ NF CARE MODERATE MDM 30: CPT | Performed by: FAMILY MEDICINE

## 2020-06-01 ENCOUNTER — NURSING HOME VISIT (OUTPATIENT)
Dept: GERIATRICS | Facility: OTHER | Age: 70
End: 2020-06-01
Payer: MEDICARE

## 2020-06-01 DIAGNOSIS — I50.9 CHRONIC CONGESTIVE HEART FAILURE, UNSPECIFIED HEART FAILURE TYPE (HCC): ICD-10-CM

## 2020-06-01 DIAGNOSIS — I82.409 DEEP VEIN THROMBOSIS (DVT) OF LOWER EXTREMITY, UNSPECIFIED CHRONICITY, UNSPECIFIED LATERALITY, UNSPECIFIED VEIN (HCC): ICD-10-CM

## 2020-06-01 DIAGNOSIS — N30.01 ACUTE CYSTITIS WITH HEMATURIA: ICD-10-CM

## 2020-06-01 DIAGNOSIS — R79.1 SUPRATHERAPEUTIC INR: Primary | ICD-10-CM

## 2020-06-01 PROCEDURE — 99310 SBSQ NF CARE HIGH MDM 45: CPT | Performed by: FAMILY MEDICINE

## 2020-06-17 ENCOUNTER — NURSING HOME VISIT (OUTPATIENT)
Dept: GERIATRICS | Facility: OTHER | Age: 70
End: 2020-06-17
Payer: MEDICARE

## 2020-06-17 DIAGNOSIS — R19.7 DIARRHEA OF PRESUMED INFECTIOUS ORIGIN: Primary | ICD-10-CM

## 2020-06-17 DIAGNOSIS — N31.9 NEUROGENIC BLADDER: ICD-10-CM

## 2020-06-17 DIAGNOSIS — L03.115 CELLULITIS OF RIGHT LOWER EXTREMITY: ICD-10-CM

## 2020-06-17 DIAGNOSIS — M86.60 CHRONIC OSTEOMYELITIS (HCC): ICD-10-CM

## 2020-06-17 DIAGNOSIS — I82.409 DEEP VEIN THROMBOSIS (DVT) OF LOWER EXTREMITY, UNSPECIFIED CHRONICITY, UNSPECIFIED LATERALITY, UNSPECIFIED VEIN (HCC): ICD-10-CM

## 2020-06-17 PROCEDURE — 99309 SBSQ NF CARE MODERATE MDM 30: CPT | Performed by: NURSE PRACTITIONER

## 2020-07-06 ENCOUNTER — NURSING HOME VISIT (OUTPATIENT)
Dept: GERIATRICS | Facility: OTHER | Age: 70
End: 2020-07-06
Payer: MEDICARE

## 2020-07-06 DIAGNOSIS — I82.409 DEEP VEIN THROMBOSIS (DVT) OF LOWER EXTREMITY, UNSPECIFIED CHRONICITY, UNSPECIFIED LATERALITY, UNSPECIFIED VEIN (HCC): ICD-10-CM

## 2020-07-06 DIAGNOSIS — I10 ESSENTIAL HYPERTENSION: ICD-10-CM

## 2020-07-06 DIAGNOSIS — G82.20 PARAPLEGIA FOLLOWING SPINAL CORD INJURY (HCC): Primary | ICD-10-CM

## 2020-07-06 DIAGNOSIS — I50.9 CHRONIC CONGESTIVE HEART FAILURE, UNSPECIFIED HEART FAILURE TYPE (HCC): ICD-10-CM

## 2020-07-06 DIAGNOSIS — G20 PARKINSON'S DISEASE (HCC): ICD-10-CM

## 2020-07-06 DIAGNOSIS — G40.909 SEIZURE DISORDER (HCC): ICD-10-CM

## 2020-07-06 DIAGNOSIS — M86.60 CHRONIC OSTEOMYELITIS (HCC): ICD-10-CM

## 2020-07-06 PROCEDURE — 99309 SBSQ NF CARE MODERATE MDM 30: CPT | Performed by: NURSE PRACTITIONER

## 2020-07-06 NOTE — PROGRESS NOTES
Progress Note    Location: \[facility]\"" [facility]  POS: 32 (LTC)    Assessment/Plan:    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[facility] )  Bed bound by preference  Continue low air loss mattress  Continue prevalon boot to feet  Continue 24/7 LTCF supportive care and management  Continue PT/OT/ST as needed    Parkinson's disease ([facility] )  Followed by [address] [facility] Neurology  Last seen on June 2019  Unable to do appointment set on June 9, 2020  Nursing to re-scheduled appointment on next possible date  Continue Sinemet QID  Continue the following supplements: * Biotin 10mg daily  * Hair/Skin/Nails daily  * Vit B12 500mcg daily  Continue 24/7 LCTF supportive care  HTN (hypertension)  BP range (7/1-9/2020) = 104/69 to 130/67  Continue Amlodipine 10mg daily - with HOLD parameter    Chronic osteomyelitis ([facility] )  Chronically elevated inflammatory markers  Followed by Plastic and vascular office  VSS  No febrile episode  Left stage 4 pressure ulcer - resolved  Has open wound to Right gluteal area with tunneling  Continue low air loss mattress and Prevalon boots  Will continue to monitor     CHF (congestive heart failure) ([place] [address] )  Wt Readings from Last 3 Encounters:   03/10/20 95 3 kg (210 lb)   05/16/19 97 9 kg (215 lb 13 3 oz)   05/03/19 86 2 kg (190 lb)     Current weight: 89 59 kgs (7/2/2020) <= 94 36 kgs (6/202020)  Not on diuretics  Minimal BLE edema  Continue monthly weight  BP stable and controlled  Renal functions WNL (6/18/20200    DVT (deep venous thrombosis) ([facility])  INR therapeutic (7/2020): 2 6  Continue weekly PT/INR monitoring  Have periodic supratherapeutic levels despite no dose change  Continue Coumadin    Seizure disorder ([facility])  No seizure activity since 1985  Continue Phenytoin 100mg BID  Nursing to continue to monitor for seizure activity and document appropriately      Chief complaint / Reason for visit: Routine Follow-up visit for chronic medical conditions    History of Present Illness: This is a 72 y o   Female patient admitted at [facility] for Paraplegia related to Spinal injury  Patient is seen and examined today to follow-up acute and chronic medical conditions: Chronic Osteomyelitis, neurogenic Bladder, DVT, Parkinson's disease, Seizure Disorder  Patient is in bed for this visit - alert, irritable, inconsistently cooperative, selective verbal responses only, verbal with clear coherent speech  Did not respond to orientation/mentation assessment on this visit  Reported loose stools for more than one month now - reported being not happy on why she was placed only on limited Loperamide dose - " when she obviously needs more"  Attempted to provide reason for PRN loperamide but does not seem to be receptive to medical reasons  C  Diff stool test done on 6/17/2020 was NEGATIVE  Review of BM log showed 1-2 weeks (June, 2020) of loose stools 2-3 x per day as documented but otherwise for the month of July, 2020 was documented at once daily bowel movement with an an average of small to moderate  Patient does not respond to attempts to assess for GI discomfort on this visit  Per nursing, no loose stools for July, 2020 and often have semi formed stools - ordered to cancel stool culture per this report  Also discontinued Miralax, Senna and Colace as patient has been declining  Will however order Metamucil 2 capsules daily  Nursing to monitor stool quality and consistency  Will order labs: CBC with diff and CMP on 7/8/2020  Examination though limited is unremarkable  V/S: T98 2F -P75 -R18 BP: 115/80 SpO2: 100% RA  Reviewed most recent labs done on 6/18/2020: Low creatinine otherwise renal function WNL, Elevated alk  Phosphatase  Low Ca/Alb levels  WBC and differentials WNL, Elevated Platelet and MPV count  Chronic mild hyponatremia  PT/INR (7/2/2020) = therapeutic level  Review of Systems:  Per history of present illness, all other systems reviewed and negative      HISTORY:  Medical Hx: Reviewed, unchanged  Family Hx: Reviewed, unchanged  Soc Hx: Reviewed,  Unchanged    ALLERGY: Reviewed, unchanged  Allergies   Allergen Reactions    Latex     Other Other (See Comments)     Irritation  Latex gloves OK per pt  PHYSICAL EXAM:  Vital Signs: T98 2F -P75 -R18 BP: 115/80 SpO2: 100% RA  Weight: 197 1 lbs (7/2/2020) <= 207 6 lbs (6/2/2020) <= 207 3 lbs (5/3/2020)      Physical Exam   Constitutional: She appears well-developed and well-nourished  No distress  Obese patient, alert, verbal with clear coherent speech  HENT:   Head: Normocephalic and atraumatic  Right Ear: External ear normal    Left Ear: External ear normal    Nose: Nose normal    Mouth/Throat: Oropharynx is clear and moist  No oropharyngeal exudate  Eyes: Pupils are equal, round, and reactive to light  Conjunctivae are normal  Right eye exhibits no discharge  Left eye exhibits no discharge  No scleral icterus  Neck: Neck supple  No JVD present  No tracheal deviation present  No thyromegaly present  Cardiovascular: Normal rate, regular rhythm and normal heart sounds  Exam reveals no gallop and no friction rub  No murmur heard  Pulmonary/Chest: Effort normal and breath sounds normal  No stridor  No respiratory distress  She has no wheezes  She has no rales  She exhibits no tenderness  Abdominal: Soft  Bowel sounds are normal  She exhibits no distension and no mass  There is no tenderness  There is no rebound and no guarding  Genitourinary:   Genitourinary Comments: SP catheter in place - clear mariel urine in bag  Musculoskeletal: She exhibits edema  She exhibits no tenderness or deformity  Non ambulatory - quadriplegia  Bed bound by preference  Per nursing rarely gets OOB - for appointments and showers only  Eats in bed - needs set-up only  Open wound to Right mid gluteal area: 100% granulation, friable, tunneling by 4cm  Left ischial pressure wound - resolved on this visit  No other wounds     Lymphadenopathy:     She has no cervical adenopathy  Neurological: She is alert  Skin: Skin is warm and dry  No rash noted  She is not diaphoretic  No erythema  No pallor  Laboratory results / Imaging reivewed: Hard copies in medical chart:    * INR (7/2/2020) = 2 6 (therapeutic)    * Cbc with diff (6/18/2020) = WNL except:  Plt: 383 (H)  MPV: 6 8 (L)  RDW: 16 8 (H)    * CMP * Cbc with diff (6/18/2020) = WNL except:  CreA: 0 29 (L)  Na: 132 (L)  Cl: 95 (L)  Ca: 8 4 (L)  Alk  Phosp: 162 (H)  Alb: 2 5 (L)    * C diff stools (6/17/2020) = NEGATIVE      Current Medications:   All medications reviewed and updated in [address] [place] [address] [id number]  7/9/2020

## 2020-07-08 ENCOUNTER — TELEPHONE (OUTPATIENT)
Dept: OTHER | Facility: OTHER | Age: 70
End: 2020-07-08

## 2020-07-09 PROBLEM — L89.314 PRESSURE INJURY OF RIGHT BUTTOCK, STAGE 4 (HCC): Status: RESOLVED | Noted: 2019-11-18 | Resolved: 2020-07-09

## 2020-07-09 PROBLEM — R19.7 DIARRHEA OF PRESUMED INFECTIOUS ORIGIN: Status: RESOLVED | Noted: 2020-06-17 | Resolved: 2020-07-09

## 2020-07-09 NOTE — ASSESSMENT & PLAN NOTE
Chronically elevated inflammatory markers  Followed by Plastic and vascular office  VSS   No febrile episode  Left stage 4 pressure ulcer - resolved  Has open wound to Right gluteal area with tunneling  Continue low air loss mattress and Prevalon boots  Will continue to monitor

## 2020-07-09 NOTE — ASSESSMENT & PLAN NOTE
Followed by [facility]  Last seen on June 2019  Unable to do appointment set on June 9, 2020  Nursing to re-scheduled appointment on next possible date  Continue Sinemet QID  Continue the following supplements: * Biotin 10mg daily  * Hair/Skin/Nails daily  * Vit B12 500mcg daily  Continue 24/7 TF supportive care

## 2020-07-09 NOTE — ASSESSMENT & PLAN NOTE
INR therapeutic (7/2020): 2 6  Continue weekly PT/INR monitoring  Have periodic supratherapeutic levels despite no dose change  Continue Coumadin

## 2020-07-09 NOTE — ASSESSMENT & PLAN NOTE
No seizure activity since 1985  Continue Phenytoin 100mg BID  Nursing to continue to monitor for seizure activity and document appropriately

## 2020-07-09 NOTE — ASSESSMENT & PLAN NOTE
Wt Readings from Last 3 Encounters:   03/10/20 95 3 kg (210 lb)   05/16/19 97 9 kg (215 lb 13 3 oz)   05/03/19 86 2 kg (190 lb)     Current weight: 89 59 kgs (7/2/2020) <= 94 36 kgs (6/202020)  Not on diuretics  Minimal BLE edema  Continue monthly weight  BP stable and controlled  Renal functions WNL (6/18/20200

## 2020-07-09 NOTE — ASSESSMENT & PLAN NOTE
Bed bound by preference  Continue low air loss mattress  Continue prevalon boot to feet  Continue 24/7 LTCF supportive care and management  Continue PT/OT/ST as needed

## 2020-07-10 ENCOUNTER — NURSING HOME VISIT (OUTPATIENT)
Dept: GERIATRICS | Facility: OTHER | Age: 70
End: 2020-07-10
Payer: MEDICARE

## 2020-07-10 DIAGNOSIS — R50.9 FEVER, UNSPECIFIED FEVER CAUSE: Primary | ICD-10-CM

## 2020-07-10 DIAGNOSIS — R79.1 SUPRATHERAPEUTIC INR: ICD-10-CM

## 2020-07-10 DIAGNOSIS — N31.9 NEUROGENIC BLADDER: ICD-10-CM

## 2020-07-10 PROCEDURE — 99310 SBSQ NF CARE HIGH MDM 45: CPT | Performed by: FAMILY MEDICINE

## 2020-07-10 NOTE — PROGRESS NOTES
[facility]  [address]  [phone] of Service: nursing home place of service: POS 32 Unskilled- No Part A Coverage      NAME: Marleen Livingston  AGE: 72 y o  SEX: female [phone]    DATE OF ENCOUNTER: 7/10/2020    Assessment and Plan     Problem List Items Addressed This Visit        Other    Neurogenic bladder     SP cath in place  Urine is dark in color, possible source of infection  Will send UA C&S along with blood cx  Supratherapeutic INR     INR today 3 2, will continue to hold coumadin and recheck PT/INR on 7/12  No signs of active bleeding  Coumadin was on hold for the past 3 days due to an INR of 4 9         Fever - Primary     Will order CBC with differential, CMP , procalcitonin UA and C&S, blood cx 2 sites CXR 2 views STAT  Tested for COVID on 7/8 for screening, results pending  Will continue to monitor VS q 2 hours   Encourage po hydration  Tylenol PRN fever  Start iv NS @70 cc/h  Consider starting empiric antibiotic after blood cx is collected  Chief Complaint     Acute visit- fever 103 2F    History of Present Illness     Marleen Livingston is a 72 y o  female who was seen today for acute visit  I was notified by her nurse that she had a fever of 103 F repeated 3 times, all over 103  She states that she had chills earlier during the day and now she feels hot because of "all the blankets that I had on me"  She denies chest pain, palpitations, SOB, nausea, abdominal pain  When asked about cough she said she is usually clearing her throat and not really coughing  The staff reported that she was coughing earlier during the day            The following portions of the patient's history were reviewed and updated as appropriate: allergies, current medications, past family history, past medical history, past social history, past surgical history and problem list     Review of Systems     Review of Systems   Constitutional: Negative for chills and fever  HENT: Negative for congestion and rhinorrhea  Respiratory: Negative for cough, shortness of breath and wheezing  Cardiovascular: Negative for chest pain, palpitations and leg swelling  Gastrointestinal: Negative for abdominal pain and constipation  Endocrine: Negative for cold intolerance  Genitourinary: Negative for difficulty urinating, dysuria and hematuria  Musculoskeletal: Positive for gait problem  Skin: Positive for wound  Allergic/Immunologic: Negative for environmental allergies  Neurological: Negative for dizziness and seizures  Hematological: Bruises/bleeds easily  Psychiatric/Behavioral: Negative for behavioral problems and sleep disturbance  Active Problem List     Patient Active Problem List   Diagnosis    Neurogenic bladder    [facility]el[facility])    Depression    DVT (deep venous thrombosis) ([facility])    Heart murmur    HTN (hypertension)    Hyperlipidemia    Hyponatremia    Kidney lesion, native, right    Neurogenic bowel    Paralysis ([address] )    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[facility]    Parkinson's disease ([facility]    Postoperative anemia due to acute blood loss    Pressure injury of deep tissue    Seizure disorder ([facility])    Protein-calorie malnutrition ([facility])    Ambulatory dysfunction    Chronic right shoulder pain    Cellulitis    CHF (congestive heart failure) ([facility])    Supratherapeutic INR    Acute cystitis with hematuria    Fever       Objective     Vital Signs:     Blood pressure 130/62 Heart Rate: 120 Respiratory Rate 18   Temperature 103 2 Oxygen Saturation 95% rA     Physical Exam   Constitutional: She is oriented to person, place, and time  No distress  HENT:   Head: Normocephalic and atraumatic  Eyes: Pupils are equal, round, and reactive to light  EOM are normal  Right eye exhibits no discharge  Left eye exhibits no discharge  Neck: Normal range of motion   Neck supple  Cardiovascular: Normal heart sounds  An irregularly irregular rhythm present  Tachycardia present  No murmur heard  Pulmonary/Chest: Effort normal and breath sounds normal  No respiratory distress  She has no wheezes  Abdominal: Soft  There is no tenderness  There is no rebound and no guarding  Genitourinary:   Genitourinary Comments: SP cath   Musculoskeletal: She exhibits edema (mild pitting b/l LE)  Neurological: She is alert and oriented to person, place, and time  Skin: Skin is warm and dry  No rash noted  She is not diaphoretic    wounds   Psychiatric: Her behavior is normal    Nursing note and vitals reviewed  Pertinent Laboratory/Diagnostic Studies:  Laboratory and Imaging studies reviewed  Full report in the paper chart  Current Medications   Medications reviewed and updated in facility chart      Name: Ezequiel Bowers  : 1950  MRN: [id number]  DOS: 7/10/2020      Chayito Cedeno MD  Geriatric Medicine  7/10/2020 5:10 PM

## 2020-07-10 NOTE — ASSESSMENT & PLAN NOTE
Will order CBC with differential, CMP , procalcitonin UA and C&S, blood cx 2 sites CXR 2 views STAT  Tested for COVID on 7/8 for screening, results pending  Will continue to monitor VS q 2 hours   Encourage po hydration  Tylenol PRN fever  Start iv NS @70 cc/h  Consider starting empiric antibiotic after blood cx is collected

## 2020-07-10 NOTE — ASSESSMENT & PLAN NOTE
INR today 3 2, will continue to hold coumadin and recheck PT/INR on 7/12  No signs of active bleeding    Coumadin was on hold for the past 3 days due to an INR of 4 9

## 2020-07-10 NOTE — ASSESSMENT & PLAN NOTE
SP cath in place  Urine is dark in color, possible source of infection  Will send UA C&S along with blood cx

## 2020-07-16 ENCOUNTER — TELEPHONE (OUTPATIENT)
Dept: OTHER | Facility: OTHER | Age: 70
End: 2020-07-16

## 2020-07-17 ENCOUNTER — NURSING HOME VISIT (OUTPATIENT)
Dept: GERIATRICS | Facility: OTHER | Age: 70
End: 2020-07-17
Payer: MEDICARE

## 2020-07-17 VITALS
DIASTOLIC BLOOD PRESSURE: 68 MMHG | TEMPERATURE: 98 F | RESPIRATION RATE: 18 BRPM | HEART RATE: 76 BPM | SYSTOLIC BLOOD PRESSURE: 124 MMHG | OXYGEN SATURATION: 96 %

## 2020-07-17 DIAGNOSIS — A41.9 SEPSIS WITHOUT ACUTE ORGAN DYSFUNCTION, DUE TO UNSPECIFIED ORGANISM (HCC): Primary | ICD-10-CM

## 2020-07-17 DIAGNOSIS — L89.45: ICD-10-CM

## 2020-07-17 DIAGNOSIS — R26.2 AMBULATORY DYSFUNCTION: ICD-10-CM

## 2020-07-17 DIAGNOSIS — R79.1 SUPRATHERAPEUTIC INR: ICD-10-CM

## 2020-07-17 DIAGNOSIS — G40.909 SEIZURE DISORDER (HCC): ICD-10-CM

## 2020-07-17 DIAGNOSIS — N31.9 NEUROGENIC BLADDER: ICD-10-CM

## 2020-07-17 DIAGNOSIS — G82.20 PARAPLEGIA FOLLOWING SPINAL CORD INJURY (HCC): ICD-10-CM

## 2020-07-17 DIAGNOSIS — I10 ESSENTIAL HYPERTENSION: ICD-10-CM

## 2020-07-17 PROCEDURE — 99306 1ST NF CARE HIGH MDM 50: CPT | Performed by: FAMILY MEDICINE

## 2020-07-17 NOTE — ASSESSMENT & PLAN NOTE
INR 3 8 yesterday  Received 3 mg coumadin in the hospital yesterday  PT/INR pending today  Will adjust dose as needed  No signs of active bleeding  Continue to monitor PT/INR weekly or as needed

## 2020-07-17 NOTE — ASSESSMENT & PLAN NOTE
With possible source of infection wound, osteomyelitis  Ucx grew mixed delores  Bcx no growth  Currently stable, not on antibiotics  Will continue to monitor

## 2020-07-17 NOTE — PROGRESS NOTES
[facility]   555  [address], [facility], [address]  History and Physical  POS: 31    Records Reviewed include: Hospital records      Chief Complaint/ Reason for Admission:   sepsis    History of Present Illness:       Ms Gabriela Ball is a 73 yo female who was recently admitted to [facility] due to sepsis, currently stable will be readmitted to [facility] for [address] [place] and then continue care in [address] [place] [address]  She was treated with iv antibiotics in the hospital  Source of infection not clear possible UTI, wound infection, chronic osteomyelitis  Blood cx no growth  Will continue to monitor VS and CBC  She denies pain now, no fever, chills  Her appetite is decreased and she refused her medication last evening and in am  Denies SOB, CP, palpitations  She is on chronic anticoagulation due to DVT  Last INR was supratherapeutic 3 8, await today's results  No signs of active bleeding  Allergies    Allergies   Allergen Reactions    Latex     Other Other (See Comments)     Irritation  Latex gloves OK per pt         Past Medical History  Past Medical History:   Diagnosis Date    Back pain     Congestive heart failure (HCC)     Depressive disorder     Hypertension     Neurogenic bladder     Open wound of leg     Osteoarthritis     Osteomyelitis (HCC)     Paraplegia (HCC)     Seizure ([address]     Thrombosis     Ulcer of ankle ([address] )     Urinary retention         Past Surgical History:   Procedure Laterality Date    CYSTOSCOPY      SKIN GRAFT         Family History  Family History   Problem Relation Age of Onset    Diabetes Father     Kidney cancer Father     Alzheimer's disease Mother     Bone cancer Paternal Uncle     Lung cancer Paternal Uncle        Social History  Social History     Tobacco Use   Smoking Status Never Smoker   Smokeless Tobacco Never Used      Social History     Substance and Sexual Activity   Alcohol Use No      Social History     Substance and Sexual Activity   Drug Use No Lives: [facility],  Social Support: family  Fall in the past 12 months: no  Use of assistance Device: Wheelchair    Physical Exam    Vital Signs    Vitals:    07/17/20 1446   BP: 124/68   Pulse: 76   Resp: 18   Temp: 98 °F (36 7 °C)   SpO2: 96%               Physical Exam   Constitutional: She is oriented to person, place, and time  No distress  HENT:   Head: Normocephalic and atraumatic  Eyes: Pupils are equal, round, and reactive to light  EOM are normal  Right eye exhibits no discharge  Left eye exhibits no discharge  Neck: Normal range of motion  Neck supple  Cardiovascular: Normal rate and regular rhythm  Murmur heard  Pulmonary/Chest: Effort normal and breath sounds normal  No respiratory distress  She has no wheezes  Abdominal: Soft  There is no tenderness  There is no rebound and no guarding  Genitourinary:   Genitourinary Comments: Suprapubic cath   Musculoskeletal: She exhibits no edema or tenderness  wheelchair   Neurological: She is alert and oriented to person, place, and time  A sensory deficit is present  She exhibits abnormal muscle tone  Skin: Skin is warm and dry  She is not diaphoretic  Wound left buttock   Psychiatric:   Dysphoric mood   Nursing note and vitals reviewed  Review of Systems:  Review of Systems   Constitutional: Negative for chills and fever  HENT: Negative for congestion and rhinorrhea  Respiratory: Negative for cough, shortness of breath and wheezing  Cardiovascular: Negative for chest pain, palpitations and leg swelling  Gastrointestinal: Negative for abdominal pain and constipation  Endocrine: Negative for cold intolerance  Genitourinary: Negative for difficulty urinating, dysuria and hematuria  Musculoskeletal: Positive for gait problem  Skin: Positive for wound  Allergic/Immunologic: Negative for environmental allergies  Neurological: Positive for weakness  Negative for dizziness and seizures     Hematological: Bruises/bleeds easily  Psychiatric/Behavioral: Positive for dysphoric mood  Negative for behavioral problems and sleep disturbance  List of Current Medications:    Medication reviewed  All orders signed  Complete list is in the paper chart  Allergies    Allergies   Allergen Reactions    Latex     Other Other (See Comments)     Irritation  Latex gloves OK per pt  Labs/Diagnostics (reviewed by this provider): I personally reviewed lab results and imaging studies  Full reports are in the paper chart  Assessment/Plan:    Sepsis ([facility] )  With possible source of infection wound, osteomyelitis  Ucx grew mixed delores  Bcx no growth  Currently stable, not on antibiotics  Will continue to monitor  Pressure ulcer of contiguous region involving left buttock and hip, unstageable ([facility] [facility] )  Continue local wound care  Wound cx done in the hospital grew mixed delores  No signs of acute infection currently  Will follow up with plastic surgery  Paraplegia following spinal cord injury [facility])  PT/OT as needed  Seizure disorder (HCC)  Continue phenytoin  No seizure activity noted recently  Monitor phenytoin level    Supratherapeutic INR  INR 3 8 yesterday  Received 3 mg coumadin in the hospital yesterday  PT/INR pending today  Will adjust dose as needed  No signs of active bleeding  Continue to monitor PT/INR weekly or as needed  Neurogenic bladder  With SP cath  Continue local care and flushes with NSS  Ambulatory dysfunction  Will continue PT/OT  Placed on fall precautions  The goal is to continue care in LTC  HTN (hypertension)  Currently at goal, asymptomatic    Continue same regimen and monitor VS       Pain: no  Rehab Potential:Fair  Patient Informed of Medical Condition: yes   Patient is Capable of Understanding Their Right: yes   Discharge Plan: LTCF  Vaccination:   Immunization History   Administered Date(s) Administered    INFLUENZA 02/12/2015     Advanced Directives: yes: No  Code status:Full Code  PCP: MD Valentine Nolan MD  Geriatric Medicine  2/47/48173:05 PM

## 2020-07-17 NOTE — TELEPHONE ENCOUNTER
Paged via TC:"Healthcall/ patient Mason Soria 1950 / Fay Villa ([facility])/ Callback # [phone] / Patient returned from hospital  Needs to verify orders   "

## 2020-07-17 NOTE — ASSESSMENT & PLAN NOTE
Continue local wound care  Wound cx done in the hospital grew mixed delores  No signs of acute infection currently  Will follow up with plastic surgery

## 2020-07-28 ENCOUNTER — NURSING HOME VISIT (OUTPATIENT)
Dept: GERIATRICS | Facility: OTHER | Age: 70
End: 2020-07-28
Payer: MEDICARE

## 2020-07-28 VITALS
OXYGEN SATURATION: 98 % | RESPIRATION RATE: 18 BRPM | TEMPERATURE: 97 F | DIASTOLIC BLOOD PRESSURE: 73 MMHG | HEART RATE: 87 BPM | SYSTOLIC BLOOD PRESSURE: 125 MMHG

## 2020-07-28 DIAGNOSIS — G20 PARKINSON'S DISEASE (HCC): ICD-10-CM

## 2020-07-28 DIAGNOSIS — M00.852 ARTHRITIS OF LEFT HIP DUE TO OTHER BACTERIA (HCC): Primary | ICD-10-CM

## 2020-07-28 DIAGNOSIS — F32.89 OTHER DEPRESSION: ICD-10-CM

## 2020-07-28 DIAGNOSIS — G40.909 SEIZURE DISORDER (HCC): ICD-10-CM

## 2020-07-28 DIAGNOSIS — N31.9 NEUROGENIC BLADDER: ICD-10-CM

## 2020-07-28 DIAGNOSIS — I48.91 ATRIAL FIBRILLATION WITH RVR (HCC): ICD-10-CM

## 2020-07-28 DIAGNOSIS — D62 POSTOPERATIVE ANEMIA DUE TO ACUTE BLOOD LOSS: ICD-10-CM

## 2020-07-28 DIAGNOSIS — Z79.01 CHRONIC ANTICOAGULATION: ICD-10-CM

## 2020-07-28 DIAGNOSIS — G82.20 PARAPLEGIA FOLLOWING SPINAL CORD INJURY (HCC): ICD-10-CM

## 2020-07-28 DIAGNOSIS — I50.9 CHRONIC CONGESTIVE HEART FAILURE, UNSPECIFIED HEART FAILURE TYPE (HCC): ICD-10-CM

## 2020-07-28 PROBLEM — M00.9 SEPTIC ARTHRITIS OF HIP (HCC): Status: ACTIVE | Noted: 2020-07-28

## 2020-07-28 PROCEDURE — 99306 1ST NF CARE HIGH MDM 50: CPT | Performed by: FAMILY MEDICINE

## 2020-07-28 NOTE — PROGRESS NOTES
[address] [place], [place], [address]  History and Physical  POS: 31    Records Reviewed include: Hospital records      Chief Complaint/ Reason for Admission:   Left hip septic arthritis, A fib    History of Present Illness:      Ms Ravi South is a 71 yo female who was recently admitted to [facility] due to A fib RVR and during her hospital stay was found to have left hip septic arthritis with ESBL E coli  Currently stable will continue ertapenem iv for 6 weeks  Surgical scar healing well, continue local care  Continue oxycodone pRN  Follow up with  Ortho  Monitor Cbc, CMP weekly  Currently she denies pain, fever, chills  A fib currently rate controlled , denies CP, SOB, palpitations  Will continue metoprolol, monitor electrolytes  Continue coumadin anticoagulation, will repeat PT/INr in am and monitor INR closely  Continue wound care , wound team will follow, encourage frequent repositioning  Postoperative anemia Hb 8 2- will continue to monitor CBC  Allergies    Allergies   Allergen Reactions    Latex     Other Other (See Comments)     Irritation  Latex gloves OK per pt         Past Medical History  Past Medical History:   Diagnosis Date    Back pain     Congestive heart failure (HCC)     Depressive disorder     Hypertension     Neurogenic bladder     Open wound of leg     Osteoarthritis     Osteomyelitis (HCC)     Paraplegia (HCC)     Seizure ([address] )     Thrombosis     Ulcer of ankle ([address] )     Urinary retention         Past Surgical History:   Procedure Laterality Date    CYSTOSCOPY      SKIN GRAFT         Family History  Family History   Problem Relation Age of Onset    Diabetes Father     Kidney cancer Father     Alzheimer's disease Mother     Bone cancer Paternal Uncle     Lung cancer Paternal Uncle        Social History  Social History     Tobacco Use   Smoking Status Never Smoker   Smokeless Tobacco Never Used      Social History     Substance and Sexual Activity   Alcohol Use No      Social History     Substance and Sexual Activity   Drug Use No        Lives: [facility],  Social Support: family  Fall in the past 12 months: no  Use of assistance Device: Wheelchair    Physical Exam    Vital Signs    Vitals:    07/28/20 1632   BP: 125/73   Pulse: 87   Resp: 18   Temp: (!) 97 °F (36 1 °C)   SpO2: 98%                 Physical Exam   Constitutional: She is oriented to person, place, and time  No distress  HENT:   Head: Normocephalic and atraumatic  Eyes: Pupils are equal, round, and reactive to light  EOM are normal  Right eye exhibits no discharge  Left eye exhibits no discharge  Neck: Normal range of motion  Neck supple  Cardiovascular: Normal rate and regular rhythm  Murmur heard  Pulmonary/Chest: Effort normal and breath sounds normal  No respiratory distress  She has no wheezes  Abdominal: Soft  There is no tenderness  There is no rebound and no guarding  Genitourinary:   Genitourinary Comments: SP cath   Musculoskeletal: She exhibits no edema or tenderness  wheelchair   Neurological: She is alert and oriented to person, place, and time  A sensory deficit (B/l LE) is present  She exhibits abnormal muscle tone (b/l LE)  Tremor b/l hands   Skin: Skin is warm and dry  She is not diaphoretic  There is pallor  B/l ischium wounds  Surgical scar left hip   Psychiatric:   Irritable    Nursing note and vitals reviewed  Review of Systems:  Review of Systems   Constitutional: Positive for fatigue  Negative for chills and fever  HENT: Negative for congestion and rhinorrhea  Respiratory: Negative for cough, shortness of breath and wheezing  Cardiovascular: Negative for chest pain, palpitations and leg swelling  Gastrointestinal: Negative for abdominal pain and constipation  Endocrine: Negative for cold intolerance  Genitourinary: Negative for difficulty urinating, dysuria and hematuria     Musculoskeletal: Positive for gait problem  Skin: Positive for wound  Allergic/Immunologic: Negative for environmental allergies  Neurological: Positive for tremors and weakness  Negative for dizziness and seizures  Hematological: Does not bruise/bleed easily  Psychiatric/Behavioral: Positive for dysphoric mood  Negative for behavioral problems and sleep disturbance  List of Current Medications:    Medication reviewed  All orders signed  Complete list is in the paper chart  Allergies    Allergies   Allergen Reactions    Latex     Other Other (See Comments)     Irritation  Latex gloves OK per pt  Labs/Diagnostics (reviewed by this provider): I personally reviewed lab results and imaging studies  Full reports are in the paper chart  Assessment/Plan:    Septic arthritis of hip (HCC)  Left hip septic arthritis s/p girdlestone procedure on 7/23- culture grew ESBL E coli  Was seen by ID in the hospital  Currently on ertapenem 1 g daily until September 5th  Will keep off antibiotic for 2 weeks after completion of ertapenem and will follow up with Ortho for repeat cx  Surgical scar healing well  Will consult PT/OT  Monitor CBC, CRP weekly    Atrial fibrillation with RVR ([facility])  Has A fib RVR on admission  To the hospital on 7/18  Resolved with metoprolol  Will continue metoprolol and continue anticoagulation with coumadin  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[address] )  Stable,  continue baclofen  PT/OT as needed  Encourage frequent repositioning , however patient non compliant    Depression  Continue lexapro 10 mg daily  Will reschedule her psych eval as she was admitted in the hospital when she had her previous appointments  Denies SI/HI    Postoperative anemia due to acute blood loss  Hb 8 3 today  Will continue to monitor CBC  Chronic anticoagulation  Received 4 mg coumadin today  Will repeat PT/INR in am and adjust dose as needed  Monitor PT/INR closely     Monitor for signs of active bleeding    Neurogenic bladder  With suprapubic cath in place  Encourage po hydration  Continue local care  Parkinson's disease ([facility] [address] )  Stable now  Continue sinemet  Follow up with Neurology  CHF (congestive heart failure) ([facility])  Stable , denies symptoms currently  Continue daily weights and diuresis  Continue same regimen and monitor  Placed on fluid restriction<1500ml /day and low sodium diet  Seizure disorder ([facility] [address] [address] )  No recent seizure noted  Continue phenytoin  Monitor levels 3-6 months           Pain: no  Rehab Potential:Fair  Patient Informed of Medical Condition: yes   Patient is Capable of Understanding Their Right: yes   Discharge Plan: LTCF  Vaccination:   Immunization History   Administered Date(s) Administered    INFLUENZA 02/12/2015     Advanced Directives: yes: Yes   Code status:Full Code  PCP: MD Skyla Alvarez MD  Geriatric Medicine  2/14/94924:21 PM

## 2020-07-28 NOTE — ASSESSMENT & PLAN NOTE
Stable,  continue baclofen  PT/OT as needed    Encourage frequent repositioning , however patient non compliant

## 2020-07-28 NOTE — ASSESSMENT & PLAN NOTE
Received 4 mg coumadin today  Will repeat PT/INR in am and adjust dose as needed  Monitor PT/INR closely     Monitor for signs of active bleeding

## 2020-07-28 NOTE — ASSESSMENT & PLAN NOTE
Stable , denies symptoms currently  Continue daily weights and diuresis  Continue same regimen and monitor  Placed on fluid restriction<1500ml /day and low sodium diet

## 2020-07-28 NOTE — ASSESSMENT & PLAN NOTE
Has A fib RVR on admission  To the hospital on 7/18  Resolved with metoprolol  Will continue metoprolol and continue anticoagulation with coumadin

## 2020-07-28 NOTE — ASSESSMENT & PLAN NOTE
Left hip septic arthritis s/p girdlestone procedure on 7/23- culture grew ESBL E coli  Was seen by ID in the hospital  Currently on ertapenem 1 g daily until September 5th  Will keep off antibiotic for 2 weeks after completion of ertapenem and will follow up with Ortho for repeat cx  Surgical scar healing well    Will consult PT/OT  Monitor CBC, CRP weekly

## 2020-07-28 NOTE — ASSESSMENT & PLAN NOTE
Continue lexapro 10 mg daily  Will reschedule her psych eval as she was admitted in the hospital when she had her previous appointments    Denies SI/HI

## 2020-07-30 ENCOUNTER — NURSING HOME VISIT (OUTPATIENT)
Dept: GERIATRICS | Facility: OTHER | Age: 70
End: 2020-07-30
Payer: MEDICARE

## 2020-07-30 DIAGNOSIS — I48.91 ATRIAL FIBRILLATION WITH RVR (HCC): ICD-10-CM

## 2020-07-30 DIAGNOSIS — I50.9 CHRONIC CONGESTIVE HEART FAILURE, UNSPECIFIED HEART FAILURE TYPE (HCC): ICD-10-CM

## 2020-07-30 DIAGNOSIS — D62 POSTOPERATIVE ANEMIA DUE TO ACUTE BLOOD LOSS: ICD-10-CM

## 2020-07-30 DIAGNOSIS — M00.852 ARTHRITIS OF LEFT HIP DUE TO OTHER BACTERIA (HCC): Primary | ICD-10-CM

## 2020-07-30 PROCEDURE — 99309 SBSQ NF CARE MODERATE MDM 30: CPT | Performed by: NURSE PRACTITIONER

## 2020-07-30 NOTE — PROGRESS NOTES
Progress Note    Location: [facility]  POS: 31 (SNF)    Assessment/Plan:    Septic arthritis of hip ([facility] [address] )  Afebrile  VSS  Continue Wound VAC  = Nursing to monitor that it is functional at all times  Continue low air loss mattress  Followed by ID  Continue Ertapenem 1G daily until September, 2020  Continue V/Sq Shift  Facility wound care team to follow weekly  [address] to follow weekly  Continue CBC with diff, CMP and CRP weekly  Atrial fibrillation with RVR ([facility] [address] )  Deemed stable on this visit  Still presenting with tachycardia but more regular rhythm and pace  On chronic anticoagulation  INR (7/28/2020): 2 8 (therapeutic)  BP and HR stable and controlled with current meds:  * BP range (7/28-30/2020) = 113/55 to 136/74  * HR range (7/28-30/2020) = 55 to 96/min  Continue Coumadin 4mg daily  Continue Metoprolol tartrate 50mg BID    Postoperative anemia due to acute blood loss  Noted pallor  Hbg/Hct stable level (7/28/2020)  Continue the following supplement:  * Vit B12 500mcg daily  * Biotin 10mg daily  * MVI daily  Continue weekly CBC with diff, CMP and CRP    CHF (congestive heart failure) (HCC)  Wt Readings from Last 3 Encounters:   03/10/20 95 3 kg (210 lb)   05/16/19 97 9 kg (215 lb 13 3 oz)   05/03/19 86 2 kg (190 lb)     Current wt: 92 41 kgs (7/30/2020)   Significant wt loss  BLE edema at baseline  BP and HR stable and controlled with current meds  Not on diuretics at this time  Continue the following meds:  * Amlodipine-Benazepril [10-20] daily - with HOLD parameter  * KCl ER 10mEq daily  Continue daily weight  Report wt gain 3lbs in 24 hrs or 5lbs in 1 week or sooner  Chief complaint / Reason for visit: Follow-up visit    History of Present Illness: This is a 72 y o  Female patient admitted at [facility] (7/28/2020 to present) following discharge from acute care hospitalization ([facility]) with Dx of Septic Arthritis (Left hip)      Patient is seen and examined today to follow-up acute and chronic medical conditions as mentioned above and Atrial Fibrillation, ABLA, CHF and physical deconditioning  Patient is in bed for this visit - mostly bed bound by preference - OOB only during facility activity (bingo in the past) and during doctors appointment  Highly irritable and reluctantly uncooperative on this visit - responds with one word response: YES/NO, " I don't know" or no response - this provider have to repeatedly ask question related to visit -  suspicious for hearing problem? (patient is using headphones - half way out of ear to listen to TV? - very loud volume in room on this visit or used to mute environment)  Limited ROS assessment on this visit  Patient denies pain, chest pain, SOB, headache, fever, chills, fatigue/malaise, GI/ related concerns  Examination is unremarkable - chronic findings prior to acute care transfer: tachycardia - irregular HR - toe deformities - scar tissues to BLE anterior tibia  Wound VAC to Left gluteal/ischial area - intact- functional  Stable wound to Right gluteal area - dressing in place  Rashes (maculovesicular - localized to lower leg posteriorly towards calf area: L>R  V/S: T98 0F -P86 -R19 BP: 136/74 SpO2: 96% RA    Reviewed most recent labs done on 7/28/2020 and showed anemia with abnormal indices - suggestive of microcytic iron def anemia  WBC/PLatelet WNL - Chronic hyponatremia, Low creatinine/Ca/ albumin/ TProtein level -  otherwise renal and hepatic functions WNL  Review of Systems:  Per history of present illness, all other systems reviewed and negative  HISTORY:  Medical Hx: Reviewed, unchanged  Family Hx: Reviewed, unchanged  Soc Hx: Reviewed,  Unchanged    ALLERGY: Reviewed, unchanged  Allergies   Allergen Reactions    Latex     Other Other (See Comments)     Irritation  Latex gloves OK per pt         PHYSICAL EXAM:  Vital Signs: T98 0F -P86 -R19 BP: 136/74 SpO2: 96% RA  Weight: 203 3  Lbs (7/30/2020)    Physical Exam Constitutional: She is oriented to person, place, and time  She appears well-developed and well-nourished  No distress  Alert, not in distress  HENT:   Head: Normocephalic and atraumatic  Right Ear: External ear normal    Left Ear: External ear normal    Nose: Nose normal    Mouth/Throat: Oropharynx is clear and moist  No oropharyngeal exudate  Eyes: Pupils are equal, round, and reactive to light  Conjunctivae are normal  Right eye exhibits no discharge  Left eye exhibits no discharge  No scleral icterus  Neck: Neck supple  No JVD present  No tracheal deviation present  No thyromegaly present  Cardiovascular: Normal heart sounds  Exam reveals no gallop and no friction rub  No murmur heard  Tachycardic - irregular  Pulmonary/Chest: Effort normal and breath sounds normal  No stridor  No respiratory distress  She has no wheezes  She has no rales  She exhibits no tenderness  Abdominal: Soft  Bowel sounds are normal  She exhibits no distension and no mass  There is no tenderness  There is no rebound and no guarding  Genitourinary:   Genitourinary Comments: Suprapubic catheter in place - clear light yellow urine in bag  Musculoskeletal: She exhibits edema and deformity  She exhibits no tenderness  Wound VAC to Left gluteal/ischium  Wound to Right mid gluteal area - stable - minimal drainage - dressing in place  Chronic toe deformities B/L  Prevalon boots in place  Lymphadenopathy:     She has no cervical adenopathy  Neurological: She is alert and oriented to person, place, and time  Skin: Skin is warm and dry  Mycotic toenails    Rash noted  She is not diaphoretic  There is erythema  There is pallor  Maculovesicular rash to BLE: L>R (localized)  Extensive scar tissue to anterior tibial area  Minimal BLE edema (chronic) - non pitting  Wheel chair bound - non ambulatory - paraplegia  Psychiatric:   Highly and easily irritated - reluctantly cooperative         Laboratory results / Carly Vega reviewed: Hard copies in medical chart:    * CBC without diff (2020) = WNL except:  Hb 7 (L)  Hct: 26 0 (L)  Rbc: 3 32 (L)  Platelet: 857 (H)  MPV: 6 2 (L)  MCV: 78 (L)  RDW: 17 2 (H)    * CMP (2020) = WNL except:  Crea: < 0 15 (L)  Na: 134 (L)  Cl: 98 (L)  Ca: 8 4 (L)  Alb: 1 9 (L)  TPro: 5 6 (L)  GFR: not calculated    * PT/INR (2020): 27 8 / 2 8 (therapeutic)      Current Medications:   All medications reviewed and updated in [address] [place] [address],  [id number]  2020

## 2020-07-31 NOTE — ASSESSMENT & PLAN NOTE
Deemed stable on this visit  Still presenting with tachycardia but more regular rhythm and pace    On chronic anticoagulation  INR (7/28/2020): 2 8 (therapeutic)  BP and HR stable and controlled with current meds:  * BP range (7/28-30/2020) = 113/55 to 136/74  * HR range (7/28-30/2020) = 55 to 96/min  Continue Coumadin 4mg daily  Continue Metoprolol tartrate 50mg BID

## 2020-07-31 NOTE — ASSESSMENT & PLAN NOTE
Wt Readings from Last 3 Encounters:   03/10/20 95 3 kg (210 lb)   05/16/19 97 9 kg (215 lb 13 3 oz)   05/03/19 86 2 kg (190 lb)     Current wt: 92 41 kgs (7/30/2020)   Significant wt loss  BLE edema at baseline  BP and HR stable and controlled with current meds  Not on diuretics at this time  Continue the following meds:  * Amlodipine-Benazepril [10-20] daily - with HOLD parameter  * KCl ER 10mEq daily  Continue daily weight  Report wt gain 3lbs in 24 hrs or 5lbs in 1 week or sooner

## 2020-07-31 NOTE — ASSESSMENT & PLAN NOTE
Noted pallor  Hbg/Hct stable level (7/28/2020)  Continue the following supplement:  * Vit B12 500mcg daily  * Biotin 10mg daily  * MVI daily  Continue weekly CBC with diff, CMP and CRP

## 2020-07-31 NOTE — ASSESSMENT & PLAN NOTE
Afebrile  VSS  Continue Wound VAC  = Nursing to monitor that it is functional at all times  Continue low air loss mattress  Followed by ID  Continue Ertapenem 1G daily until September, 2020  Continue V/Sq Shift  Facility wound care team to follow weekly  [address] to follow weekly  Continue CBC with diff, CMP and CRP weekly

## 2020-08-05 ENCOUNTER — NURSING HOME VISIT (OUTPATIENT)
Dept: GERIATRICS | Facility: OTHER | Age: 70
End: 2020-08-05
Payer: MEDICARE

## 2020-08-05 DIAGNOSIS — E87.1 HYPONATREMIA: ICD-10-CM

## 2020-08-05 DIAGNOSIS — Z79.01 CHRONIC ANTICOAGULATION: ICD-10-CM

## 2020-08-05 DIAGNOSIS — M00.852 ARTHRITIS OF LEFT HIP DUE TO OTHER BACTERIA (HCC): Primary | ICD-10-CM

## 2020-08-05 DIAGNOSIS — F32.89 OTHER DEPRESSION: ICD-10-CM

## 2020-08-05 DIAGNOSIS — D62 POSTOPERATIVE ANEMIA DUE TO ACUTE BLOOD LOSS: ICD-10-CM

## 2020-08-05 PROCEDURE — 99309 SBSQ NF CARE MODERATE MDM 30: CPT | Performed by: FAMILY MEDICINE

## 2020-08-05 NOTE — PROGRESS NOTES
[facility]  [address]  [phone] [id number] of Service: nursing home place of service: [facility] Coverage      NAME: Hiram Faust  AGE: 72 y o  SEX: female [id number]    DATE OF ENCOUNTER: 8/6/2020    Assessment and Plan     Problem List Items Addressed This Visit        Musculoskeletal and Integument    Septic arthritis of hip ([address] ) - Primary     Continue ertapenem as per ID recommendation  She remains afebrile, VSS  Will follow up with Ortho and ID  Monitor VS closely  Monitor CBC, CMp, CRP  Continue PT/OT            Other    Depression     Pt with dysphoric mood, denies SI/HI  Started on mirtazapine in the hospital, will continue sertraline  Consulted psych and psychotherapy  Continue to monitor  Hyponatremia     Mild hyponatremia noted  Will continue to monitor BMP  Postoperative anemia due to acute blood loss     Last Hb 8 7, will continue to monitor CBC weekly or as needed  Maintain Hb>7 0         Chronic anticoagulation     Slightly elevated INR , will hold coumadin and repeat PT/INR  Monitor PT/INR closely  No signs of active bleeding                   Chief Complaint     Follow up left hip septic arthritis    History of Present Illness     Hiram Faust is a 72 y o  female who was seen today for follow up  Pt seen and examined at bedside  Still with dysphoric mood and easily irritable  She denies CP, SOB, cough, palpitations  No fever, chills  Denies pain , usually states "I am paraplegic, I don't have pain"  Appetite is decreased, she reports fatigue  The following portions of the patient's history were reviewed and updated as appropriate: allergies, current medications, past family history, past medical history, past social history, past surgical history and problem list     Review of Systems     Review of Systems   Constitutional: Negative for chills and fever     HENT: Negative for congestion and rhinorrhea  Respiratory: Negative for cough, shortness of breath and wheezing  Cardiovascular: Negative for chest pain, palpitations and leg swelling  Gastrointestinal: Negative for abdominal pain and constipation  Endocrine: Negative for cold intolerance  Genitourinary: Negative for difficulty urinating, dysuria and hematuria  Musculoskeletal: Positive for gait problem  Skin: Positive for wound  Allergic/Immunologic: Negative for environmental allergies  Neurological: Positive for weakness  Negative for dizziness and seizures  Hematological: Bruises/bleeds easily  Psychiatric/Behavioral: Positive for dysphoric mood  Negative for behavioral problems and sleep disturbance  Active Problem List     Patient Active Problem List   Diagnosis    Neurogenic bladder    [facility]omyelitis ([facility])    Depression    DVT (deep venous thrombosis) ([facility])    Heart murmur    HTN (hypertension)    Hyperlipidemia    Hyponatremia    Pressure ulcer of contiguous region involving left buttock and hip, unstageable [facility]    Kidney lesion, native, right    Neurogenic bowel    Paralysis [facility]    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    Parkinson's disease ([facility]    Postoperative anemia due to acute blood loss    Pressure injury of deep tissue    Seizure disorder ([facility])    Protein-calorie malnutrition ([facility])    Ambulatory dysfunction    Chronic right shoulder pain    Cellulitis    CHF (congestive heart failure) ([facility])    Supratherapeutic INR    Acute cystitis with hematuria    Fever    Sepsis ([address] )    Septic arthritis of hip ([address] )    Atrial fibrillation with RVR ([facility])    Chronic anticoagulation       Objective     Vital Signs:     Blood pressure 124/68 Heart Rate: 72 Respiratory Rate 18   Temperature 97 9 Oxygen Saturation 96% RA     Physical Exam   Constitutional: She is oriented to person, place, and time  No distress     HENT:   Head: Normocephalic and atraumatic  Eyes: Pupils are equal, round, and reactive to light  Right eye exhibits no discharge  Left eye exhibits no discharge  Neck: Normal range of motion  Neck supple  Cardiovascular: Normal rate and regular rhythm  Murmur heard  Pulmonary/Chest: Effort normal and breath sounds normal  No respiratory distress  She has no wheezes  Abdominal: Soft  There is no abdominal tenderness  There is no rebound and no guarding  Genitourinary:    Genitourinary Comments: SP cath     Musculoskeletal:         General: No tenderness  Right lower leg: No edema  Left lower leg: No edema  Comments: wheelchair   Neurological: She is alert and oriented to person, place, and time  She displays weakness (b/l LE)  A sensory deficit (b/l LE) is present  Gait abnormal    Tremors b/l hands   Skin: Skin is warm and dry  She is not diaphoretic  There is pallor  Wounds b/l ischium, surgical scar left hip   Psychiatric: Her behavior is normal    Nursing note and vitals reviewed  Pertinent Laboratory/Diagnostic Studies:  Laboratory and Imaging studies reviewed  Full report in the paper chart  Current Medications   Medications reviewed and updated in facility chart      Name: Avis Friashermilo  : 1950  MRN: [id number]  DOS: 2020      Rudi Avalos MD  Geriatric Medicine  2020 5:53 PM

## 2020-08-06 NOTE — ASSESSMENT & PLAN NOTE
Pt with dysphoric mood, denies SI/HI  Started on mirtazapine in the hospital, will continue sertraline  Consulted psych and psychotherapy  Continue to monitor

## 2020-08-06 NOTE — ASSESSMENT & PLAN NOTE
Continue ertapenem as per ID recommendation  She remains afebrile, VSS  Will follow up with Ortho and ID  Monitor VS closely    Monitor CBC, CMp, CRP  Continue PT/OT

## 2020-08-06 NOTE — ASSESSMENT & PLAN NOTE
Slightly elevated INR , will hold coumadin and repeat PT/INR  Monitor PT/INR closely    No signs of active bleeding

## 2020-08-27 ENCOUNTER — TELEPHONE (OUTPATIENT)
Dept: OTHER | Facility: OTHER | Age: 70
End: 2020-08-27

## 2020-09-15 ENCOUNTER — NURSING HOME VISIT (OUTPATIENT)
Dept: WOUND CARE | Facility: HOSPITAL | Age: 70
End: 2020-09-15
Payer: MEDICARE

## 2020-09-15 DIAGNOSIS — L89.324 PRESSURE INJURY OF LEFT BUTTOCK, STAGE 4 (HCC): Primary | ICD-10-CM

## 2020-09-15 DIAGNOSIS — G82.20 PARAPLEGIA FOLLOWING SPINAL CORD INJURY (HCC): ICD-10-CM

## 2020-09-15 PROCEDURE — 99335 PR DOM/R-HOME E/M EST PT LW MOD SEVERITY 25 MINUTES: CPT | Performed by: NURSE PRACTITIONER

## 2020-09-15 NOTE — ASSESSMENT & PLAN NOTE
- Location : left buttock  - Wound healing status: stable,   - Autolytic debridement using AMD packing  - Pressure redistribution device - low air loss mattress  - Continue to offload  - Clinical factors affecting wound healing includes age, chronicity, co-morbidities, incontinence, location of the wound and, mobility impairement  -Follow up : Next week

## 2020-09-15 NOTE — PROGRESS NOTES
Patient ID: Anthony eJnsen is a 72 y o  female Date of Birth 1950     Location Services: [facility]    Performed wound round with: Ariana Corona RN    Chief Complaint   Patient presents with    Follow Up Wound Care Visit     left buttock       Allergies  Latex and Other    Assessment & Plan:  1  Pressure injury of 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 ([facility])  Assessment & Plan:  - Location : left buttock  - Wound healing status: stable,   - Autolytic debridement using AMD packing  - Pressure redistribution device - low air loss mattress  - Continue to offload  - Clinical factors affecting wound healing includes age, chronicity, co-morbidities, incontinence, location of the wound and, mobility impairement  -Follow up : Next week       Orders:  -     Wound cleansing and dressings; Future    2  Paraplegia following spinal cord injury [facility])  Assessment & Plan:  -currently under the care of Dr Tk Jimenez  - stable          Subjective: This is a follow up of the wound on the left buttock  Patient was last seen  on 6/30/2020  She was admitted to acute care and when she came back to [facility], the wound was managed by the facility wound team  Re-consult today due to increasing depth of the wound  This is a chronic wound and is under the care of the plastic surgeon, cannot find the information of the next scheduled appointment with the surgeon  Etiology - pressure ulcer  Severity - mild, stable  Current treatment - dakins  The following portions of the patient's history were reviewed and updated as appropriate: allergies, current medications, past family history, past medical history, past social history, past surgical history, and problem list     Review of Systems   Constitutional: Negative  HENT: Negative  Eyes: Negative  Respiratory: Negative  Cardiovascular: Positive for leg swelling  Gastrointestinal: Negative  Endocrine: Negative  Genitourinary: Negative      Musculoskeletal: Positive for gait problem  Skin: Positive for wound  Neurological: Negative for dizziness and headaches  Psychiatric/Behavioral: Negative for behavioral problems  Objective: There were no vitals taken for this visit  Physical Exam  HENT:      Head: Normocephalic  Neck:      Musculoskeletal: Normal range of motion  Cardiovascular:      Rate and Rhythm: Normal rate  Pulses: Normal pulses  Pulmonary:      Effort: Pulmonary effort is normal    Abdominal:      Palpations: Abdomen is soft  Tenderness: There is no abdominal tenderness  There is no guarding  Musculoskeletal: Normal range of motion  General: No tenderness  Right lower leg: No edema  Left lower leg: No edema  Skin:     General: Skin is warm  Findings: Lesion present  Neurological:      Mental Status: She is alert and oriented to person, place, and time  Psychiatric:         Mood and Affect: Mood normal          Thought Content: Thought content normal          Wound Pressure Injury Left (Active)   Wound Description Beefy red 09/15/20 1604   Pressure Injury Stage Stage 4 09/15/20 1604   Yessenia-wound Assessment Intact;Dry;Clean 09/15/20 1604   Wound Length (cm) 1 cm 09/15/20 1604   Wound Width (cm) 1 2 cm 09/15/20 1604   Wound Depth (cm) 5 5 cm 09/15/20 1604   Wound Surface Area (cm^2) 1 2 cm^2 09/15/20 1604   Wound Volume (cm^3) 6 6 cm^3 09/15/20 1604   Calculated Wound Volume (cm^3) 6 6 cm^3 09/15/20 1604   Tunneling 0 cm 09/15/20 1604   Undermining 0 09/15/20 1604   Drainage Amount Small 09/15/20 1604   Drainage Description Serosanguineous 09/15/20 1604   Non-staged Wound Description Full thickness 09/15/20 1604   Treatments Cleansed 09/15/20 1604   Dressing Changed Changed 09/15/20 1604   Dressing Status Clean;Dry; Intact 09/15/20 1604       Procedures           Coordination of Care: Raya Carrera RN aware of the treatment plan    Wound Instructions:  Orders Placed This Encounter   Procedures    Wound cleansing and dressings     Wound:  Left sacrum  Cleanse with NSS  Apply non-sting skin prep to periwound area  Gently fill the wound cavity with AMD packing strip  ( please tape the tail of the strip in the ABD pad ) then cover with ABD and secure with mepilex tape    Frequency : daily and prn for soiling  Offload all wounds  Turn and reposition frequently, minimum of every two hours  Increase protein intake as per RD recommendation  Monitor for any sign of infection or worsening, inform PCP     Standing Status:   Future     Standing Expiration Date:   9/15/2021         SVETLANA Turner

## 2020-09-15 NOTE — PATIENT INSTRUCTIONS
Orders Placed This Encounter   Procedures    Wound cleansing and dressings     Wound:  Left sacrum  Cleanse with NSS  Apply non-sting skin prep to periwound area  Gently fill the wound cavity with AMD packing strip  ( please tape the tail of the strip in the ABD pad ) then cover with ABD and secure with mepilex tape    Frequency : daily and prn for soiling  Offload all wounds  Turn and reposition frequently, minimum of every two hours  Increase protein intake as per RD recommendation  Monitor for any sign of infection or worsening, inform PCP     Standing Status:   Future     Standing Expiration Date:   9/15/2021

## 2020-09-22 ENCOUNTER — NURSING HOME VISIT (OUTPATIENT)
Dept: WOUND CARE | Facility: HOSPITAL | Age: 70
End: 2020-09-22
Payer: MEDICARE

## 2020-09-22 DIAGNOSIS — G82.20 PARAPLEGIA FOLLOWING SPINAL CORD INJURY (HCC): ICD-10-CM

## 2020-09-22 DIAGNOSIS — L89.324 PRESSURE INJURY OF LEFT BUTTOCK, STAGE 4 (HCC): Primary | ICD-10-CM

## 2020-09-22 PROCEDURE — 99335 PR DOM/R-HOME E/M EST PT LW MOD SEVERITY 25 MINUTES: CPT | Performed by: NURSE PRACTITIONER

## 2020-09-22 NOTE — PATIENT INSTRUCTIONS
Orders Placed This Encounter   Procedures    Wound cleansing and dressings     Wound:  Left buttock  Cleanse with NSS  Apply non-sting skin prep to periwound area  Gently fill the wound cavity with AMD packing strip  ( please tape the tail of the strip in the ABD pad ) then cover with ABD and secure with mepilex tape    Frequency : daily and prn for soiling  Offload all wounds  Turn and reposition frequently, minimum of every two hours  Increase protein intake as per RD recommendation  Monitor for any sign of infection or worsening, inform PCP     Standing Status:   Future     Standing Expiration Date:   9/22/2021

## 2020-09-22 NOTE — LETTER
Patient:  Connie Sanz   1950       To whom it may concern,    I saw Connie Sanz for a wound care visit on 9/22/2020  See below for information relating to this visit  Chief Complaint   Patient presents with    Follow Up Wound Care Visit     left buttock        Assessment/Plan:  1  Pressure injury of 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 ([facility])  Assessment & Plan:  Location : left buttock  - Wound healing status: stable, decrease in size  - Autolytic debridement using AMD packing  - Pressure redistribution device - low air loss mattress  - Continue to offload  - Clinical factors affecting wound healing includes age, chronicity, co-morbidities, incontinence, location of the wound and, mobility impairement  -Follow up : Next week    Orders:  -     Wound cleansing and dressings; Future    2  Paraplegia following spinal cord injury [facility])  Assessment & Plan:  - Currently under the care of Dr Akira High  -stable             Orders:  Orders Placed This Encounter   Procedures    Wound cleansing and dressings     Wound:  Left buttock  Cleanse with NSS  Apply non-sting skin prep to periwound area  Gently fill the wound cavity with AMD packing strip  ( please tape the tail of the strip in the ABD pad ) then cover with ABD and secure with mepilex tape  Frequency : daily and prn for soiling  Offload all wounds  Turn and reposition frequently, minimum of every two hours  Increase protein intake as per RD recommendation  Monitor for any sign of infection or worsening, inform PCP     Standing Status:   Future     Standing Expiration Date:   9/22/2021         Follow Up:  Return in about 1 week (around 9/29/2020)  [address] hours are 8:00 am - 4:30 pm Monday through Friday  The center phone number is [phone]  You can also contact me directly thru my email at [facility]  [email or web address]  [email or web address] or thru tiger text   If it is an emergency, please contact the PCP or patient's attending physician in your facility       Sincerely,    SVETLANA Poole

## 2020-09-22 NOTE — PROGRESS NOTES
Patient ID: Jennifer Chandler is a 72 y o  female Date of Birth 1950     Location Services: [facility]    Performed wound round with: CHITO    Chief Complaint   Patient presents with    Follow Up Wound Care Visit     left buttock       Allergies  Latex and Other    Assessment & Plan:  1  Pressure injury of 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 ([facility])  Assessment & Plan:  Location : left buttock  - Wound healing status: stable, decrease in size  - Autolytic debridement using AMD packing  - Pressure redistribution device - low air loss mattress  - Continue to offload  - Clinical factors affecting wound healing includes age, chronicity, co-morbidities, incontinence, location of the wound and, mobility impairement  -Follow up : Next week    Orders:  -     Wound cleansing and dressings; Future    2  Paraplegia following spinal cord injury [facility])  Assessment & Plan:  - Currently under the care of Dr Denver Passy  -stable          Subjective: This is a follow up of the wound on the left buttock  Patient was last seen  on 6/30/2020  She was admitted to acute care and when she came back to [facility], the wound was managed by the facility wound team  Re-consult today due to increasing depth of the wound  This is a chronic wound and is under the care of the plastic surgeon, cannot find the information of the next scheduled appointment with the surgeon  Etiology - pressure ulcer  Severity - mild, stable  Current treatment - AMD packing, decrease in size  Denies pain        The following portions of the patient's history were reviewed and updated as appropriate: allergies, current medications, past family history, past medical history, past social history, past surgical history, and problem list     Review of Systems   Constitutional: Negative  HENT: Negative  Eyes: Negative  Respiratory: Negative  Cardiovascular: Positive for leg swelling  Gastrointestinal: Negative  Endocrine: Negative      Genitourinary: Negative  Musculoskeletal: Positive for gait problem  Skin: Positive for wound  Neurological: Negative for dizziness and headaches  Psychiatric/Behavioral: Negative for behavioral problems  Objective: There were no vitals taken for this visit  Physical Exam  HENT:      Head: Normocephalic  Neck:      Musculoskeletal: Normal range of motion  Cardiovascular:      Rate and Rhythm: Normal rate  Pulses: Normal pulses  Pulmonary:      Effort: Pulmonary effort is normal    Abdominal:      Palpations: Abdomen is soft  Tenderness: There is no abdominal tenderness  There is no guarding  Genitourinary:     Comments: With angel catheter, intact, draining well  Musculoskeletal: Normal range of motion  General: No tenderness  Right lower leg: No edema  Left lower leg: No edema  Skin:     General: Skin is warm  Findings: Lesion present  Comments: Location Left buttock wound bed - 1 3 x 1 x 5 cm , no undermining, no tunneling, 100 %granulation ( visible wound bed), exudate - scant amount of blood, no malodor ( assess after dressing removal and cleansing), wound edge - attached to base, periwound - intact  No localized sign of infection, Denies pain   Neurological:      Mental Status: She is alert and oriented to person, place, and time  Sensory: Sensory deficit present  Gait: Gait abnormal       Comments: paraplegia   Psychiatric:         Mood and Affect: Mood normal          Thought Content: Thought content normal                          Coordination of Care: ADON aware of the treatment plan    Wound Instructions:  Orders Placed This Encounter   Procedures    Wound cleansing and dressings     Wound:  Left buttock  Cleanse with NSS  Apply non-sting skin prep to periwound area  Gently fill the wound cavity with AMD packing strip  ( please tape the tail of the strip in the ABD pad ) then cover with ABD and secure with mepilex tape    Frequency : daily and prn for soiling  Offload all wounds  Turn and reposition frequently, minimum of every two hours  Increase protein intake as per RD recommendation  Monitor for any sign of infection or worsening, inform PCP     Standing Status:   Future     Standing Expiration Date:   9/22/2021         SVETLANA Owen

## 2020-09-22 NOTE — ASSESSMENT & PLAN NOTE
Location : left buttock  - Wound healing status: stable, decrease in size  - Autolytic debridement using AMD packing  - Pressure redistribution device - low air loss mattress  - Continue to offload  - Clinical factors affecting wound healing includes age, chronicity, co-morbidities, incontinence, location of the wound and, mobility impairement  -Follow up : Next week

## 2020-09-29 ENCOUNTER — NURSING HOME VISIT (OUTPATIENT)
Dept: WOUND CARE | Facility: HOSPITAL | Age: 70
End: 2020-09-29
Payer: MEDICARE

## 2020-09-29 DIAGNOSIS — G82.20 PARAPLEGIA FOLLOWING SPINAL CORD INJURY (HCC): ICD-10-CM

## 2020-09-29 DIAGNOSIS — L89.324 PRESSURE INJURY OF LEFT BUTTOCK, STAGE 4 (HCC): Primary | ICD-10-CM

## 2020-09-29 PROCEDURE — 99335 PR DOM/R-HOME E/M EST PT LW MOD SEVERITY 25 MINUTES: CPT | Performed by: NURSE PRACTITIONER

## 2020-09-29 NOTE — LETTER
Patient:  Yuniel Almazan   1950       To whom it may concern,    I saw Yuniel Almazan for a wound care visit on 9/29/2020  See below for information relating to this visit  Chief Complaint   Patient presents with    Follow Up Wound Care Visit     left buttock        Assessment/Plan:  1  Pressure injury of 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 (HCC)  Assessment & Plan:  Location : left buttock  - Wound healing status: stable, decrease in size  - Autolytic debridement using AMD packing  - Pressure redistribution device - low air loss mattress  - Continue to offload  - Clinical factors affecting wound healing includes age, chronicity, co-morbidities, incontinence, location of the wound and, mobility impairement  -Follow up : Next week    Orders:  -     Wound cleansing and dressings; Future    2  Paraplegia following spinal cord injury [facility])  Assessment & Plan:  - stable  - under the care of Dr Martin Barnard             Orders:  Yuniel Almazan  1950  Orders Placed This Encounter   Procedures    Wound cleansing and dressings     Wound:  Left buttock  Cleanse with NSS  Apply non-sting skin prep to periwound area  Gently fill the wound cavity with AMD packing strip  ( please tape the tail of the strip in the ABD pad ) then cover with ABD and secure with mepilex tape  Frequency : daily and prn for soiling  Offload all wounds  Turn and reposition frequently, minimum of every two hours  Increase protein intake as per RD recommendation  Monitor for any sign of infection or worsening, inform PCP     Standing Status:   Future     Standing Expiration Date:   9/29/2021         Follow Up:  Return in about 1 week (around 10/6/2020)  [facility] hours are 8:00 am - 4:30 pm Monday through Friday  The center phone number is [phone]  You can also contact me directly thru my email at [facility]  [email or web address]  [email or web address] or thru tiger text   If it is an emergency, please contact the PCP or patient's attending physician in your facility       Sincerely,    SVETLANA Longo    Patient : Ifrah Ken    1950

## 2020-09-29 NOTE — PATIENT INSTRUCTIONS
Orders Placed This Encounter   Procedures    Wound cleansing and dressings     Wound:  Left buttock  Cleanse with NSS  Apply non-sting skin prep to periwound area  Gently fill the wound cavity with AMD packing strip  ( please tape the tail of the strip in the ABD pad ) then cover with ABD and secure with mepilex tape    Frequency : daily and prn for soiling  Offload all wounds  Turn and reposition frequently, minimum of every two hours  Increase protein intake as per RD recommendation  Monitor for any sign of infection or worsening, inform PCP     Standing Status:   Future     Standing Expiration Date:   9/29/2021

## 2020-09-30 NOTE — PROGRESS NOTES
Patient ID: Marleen Livingston is a 72 y o  female Date of Birth 1950     Location Services: [facility]    Performed wound round with: CHITO    Chief Complaint   Patient presents with    Follow Up Wound Care Visit     left buttock       Allergies  Latex and Other    Assessment & Plan:  1  Pressure injury of 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 ([facility])  Assessment & Plan:  Location : left buttock  - Wound healing status: stable, decrease in size  - Autolytic debridement using AMD packing  - Pressure redistribution device - low air loss mattress  - Continue to offload  - Clinical factors affecting wound healing includes age, chronicity, co-morbidities, incontinence, location of the wound and, mobility impairement  -Follow up : Next week    Orders:  -     Wound cleansing and dressings; Future    2  Paraplegia following spinal cord injury [facility])  Assessment & Plan:  - stable  - under the care of Dr Jarrod Lorenz          Subjective: This is a follow up of the wound on the left buttock  This is a chronic wound and is under the care of the plastic surgeon, cannot find the information of the next scheduled appointment with the surgeon  Etiology - pressure ulcer  Severity - mild, stable  Current treatment - AMD packing, decrease in size  Denies pain        The following portions of the patient's history were reviewed and updated as appropriate: allergies, current medications, past family history, past medical history, past social history, past surgical history, and problem list     Review of Systems   Constitutional: Negative  HENT: Negative  Eyes: Negative  Respiratory: Negative  Cardiovascular: Positive for leg swelling  Gastrointestinal: Negative  Endocrine: Negative  Genitourinary: Negative  Musculoskeletal: Positive for gait problem  Skin: Positive for wound  Neurological: Negative for dizziness and headaches  Psychiatric/Behavioral: Negative for behavioral problems  Objective:   There were no vitals taken for this visit  Physical Exam  HENT:      Head: Normocephalic  Neck:      Musculoskeletal: Normal range of motion  Cardiovascular:      Rate and Rhythm: Normal rate  Pulses: Normal pulses  Pulmonary:      Effort: Pulmonary effort is normal    Abdominal:      Palpations: Abdomen is soft  Tenderness: There is no abdominal tenderness  There is no guarding  Genitourinary:     Comments: With angel catheter, intact, draining well  Musculoskeletal: Normal range of motion  General: No tenderness  Right lower leg: No edema  Left lower leg: No edema  Skin:     General: Skin is warm  Findings: Lesion present  Comments: Location Left buttock wound bed -  5 x  5 x 2 cm , no undermining, no tunneling, 100 %granulation ( visible wound bed), exudate - scant amount of blood, no malodor ( assess after dressing removal and cleansing), wound edge - attached to base, periwound - intact  No localized sign of infection, Denies pain   Neurological:      Mental Status: She is alert and oriented to person, place, and time  Sensory: Sensory deficit present  Gait: Gait abnormal       Comments: paraplegia   Psychiatric:         Mood and Affect: Mood normal          Thought Content: Thought content normal                          Coordination of Care: ADON aware of the treatment plan    Wound Instructions:  Orders Placed This Encounter   Procedures    Wound cleansing and dressings     Wound:  Left buttock  Cleanse with NSS  Apply non-sting skin prep to periwound area  Gently fill the wound cavity with AMD packing strip  ( please tape the tail of the strip in the ABD pad ) then cover with ABD and secure with mepilex tape    Frequency : daily and prn for soiling  Offload all wounds  Turn and reposition frequently, minimum of every two hours  Increase protein intake as per RD recommendation  Monitor for any sign of infection or worsening, inform PCP     Standing Status: Future     Standing Expiration Date:   9/29/2021         SVETLANA Stringer     Procedures

## 2020-10-06 ENCOUNTER — NURSING HOME VISIT (OUTPATIENT)
Dept: WOUND CARE | Facility: HOSPITAL | Age: 70
End: 2020-10-06
Payer: MEDICARE

## 2020-10-06 DIAGNOSIS — G82.20 PARAPLEGIA FOLLOWING SPINAL CORD INJURY (HCC): ICD-10-CM

## 2020-10-06 DIAGNOSIS — L89.324 PRESSURE INJURY OF LEFT BUTTOCK, STAGE 4 (HCC): Primary | ICD-10-CM

## 2020-10-06 PROCEDURE — 99335 PR DOM/R-HOME E/M EST PT LW MOD SEVERITY 25 MINUTES: CPT | Performed by: NURSE PRACTITIONER

## 2020-10-07 ENCOUNTER — NURSING HOME VISIT (OUTPATIENT)
Dept: GERIATRICS | Facility: OTHER | Age: 70
End: 2020-10-07
Payer: MEDICARE

## 2020-10-07 DIAGNOSIS — I50.9 CHRONIC CONGESTIVE HEART FAILURE, UNSPECIFIED HEART FAILURE TYPE (HCC): ICD-10-CM

## 2020-10-07 DIAGNOSIS — G40.909 SEIZURE DISORDER (HCC): ICD-10-CM

## 2020-10-07 DIAGNOSIS — R26.2 AMBULATORY DYSFUNCTION: ICD-10-CM

## 2020-10-07 DIAGNOSIS — G82.20 PARAPLEGIA FOLLOWING SPINAL CORD INJURY (HCC): Primary | ICD-10-CM

## 2020-10-07 DIAGNOSIS — L89.324 PRESSURE INJURY OF LEFT BUTTOCK, STAGE 4 (HCC): ICD-10-CM

## 2020-10-07 DIAGNOSIS — G20 PARKINSON'S DISEASE (HCC): ICD-10-CM

## 2020-10-07 DIAGNOSIS — E78.5 HYPERLIPIDEMIA, UNSPECIFIED HYPERLIPIDEMIA TYPE: ICD-10-CM

## 2020-10-07 DIAGNOSIS — I48.91 ATRIAL FIBRILLATION WITH RVR (HCC): ICD-10-CM

## 2020-10-07 DIAGNOSIS — F32.89 OTHER DEPRESSION: ICD-10-CM

## 2020-10-07 DIAGNOSIS — I10 ESSENTIAL HYPERTENSION: ICD-10-CM

## 2020-10-07 DIAGNOSIS — N31.9 NEUROGENIC BLADDER: ICD-10-CM

## 2020-10-07 PROCEDURE — 99309 SBSQ NF CARE MODERATE MDM 30: CPT | Performed by: NURSE PRACTITIONER

## 2020-10-13 ENCOUNTER — NURSING HOME VISIT (OUTPATIENT)
Dept: WOUND CARE | Facility: HOSPITAL | Age: 70
End: 2020-10-13
Payer: MEDICARE

## 2020-10-13 DIAGNOSIS — G82.20 PARAPLEGIA FOLLOWING SPINAL CORD INJURY (HCC): ICD-10-CM

## 2020-10-13 DIAGNOSIS — L89.324 PRESSURE INJURY OF LEFT BUTTOCK, STAGE 4 (HCC): Primary | ICD-10-CM

## 2020-10-13 PROCEDURE — 99335 PR DOM/R-HOME E/M EST PT LW MOD SEVERITY 25 MINUTES: CPT | Performed by: NURSE PRACTITIONER

## 2020-10-20 ENCOUNTER — NURSING HOME VISIT (OUTPATIENT)
Dept: WOUND CARE | Facility: HOSPITAL | Age: 70
End: 2020-10-20
Payer: MEDICARE

## 2020-10-20 DIAGNOSIS — L89.324 PRESSURE INJURY OF LEFT BUTTOCK, STAGE 4 (HCC): Primary | ICD-10-CM

## 2020-10-20 DIAGNOSIS — G82.20 PARAPLEGIA FOLLOWING SPINAL CORD INJURY (HCC): ICD-10-CM

## 2020-10-20 PROCEDURE — 99335 PR DOM/R-HOME E/M EST PT LW MOD SEVERITY 25 MINUTES: CPT | Performed by: NURSE PRACTITIONER

## 2020-10-27 ENCOUNTER — NURSING HOME VISIT (OUTPATIENT)
Dept: WOUND CARE | Facility: HOSPITAL | Age: 70
End: 2020-10-27
Payer: MEDICARE

## 2020-10-27 DIAGNOSIS — G82.20 PARAPLEGIA FOLLOWING SPINAL CORD INJURY (HCC): ICD-10-CM

## 2020-10-27 DIAGNOSIS — L89.324 PRESSURE INJURY OF LEFT BUTTOCK, STAGE 4 (HCC): Primary | ICD-10-CM

## 2020-10-27 PROCEDURE — 99335 PR DOM/R-HOME E/M EST PT LW MOD SEVERITY 25 MINUTES: CPT | Performed by: NURSE PRACTITIONER

## 2020-11-03 ENCOUNTER — NURSING HOME VISIT (OUTPATIENT)
Dept: WOUND CARE | Facility: HOSPITAL | Age: 70
End: 2020-11-03
Payer: MEDICARE

## 2020-11-03 DIAGNOSIS — G82.20 PARAPLEGIA FOLLOWING SPINAL CORD INJURY (HCC): ICD-10-CM

## 2020-11-03 DIAGNOSIS — L89.324 PRESSURE INJURY OF LEFT BUTTOCK, STAGE 4 (HCC): ICD-10-CM

## 2020-11-03 PROCEDURE — 99335 PR DOM/R-HOME E/M EST PT LW MOD SEVERITY 25 MINUTES: CPT | Performed by: NURSE PRACTITIONER

## 2020-11-03 RX ORDER — SODIUM CHLORIDE 0.9 % (FLUSH) 0.9 %
10 SYRINGE (ML) INJECTION
COMMUNITY
End: 2021-03-19

## 2020-11-03 RX ORDER — OXYBUTYNIN CHLORIDE 5 MG/1
TABLET ORAL
COMMUNITY
Start: 2020-10-23 | End: 2020-11-03 | Stop reason: SDUPTHER

## 2020-11-03 RX ORDER — POTASSIUM CHLORIDE 750 MG/1
TABLET, EXTENDED RELEASE ORAL
COMMUNITY
Start: 2020-10-23

## 2020-11-03 RX ORDER — OXYCODONE HYDROCHLORIDE 5 MG/1
5 TABLET ORAL EVERY 6 HOURS PRN
COMMUNITY
Start: 2020-07-28 | End: 2020-11-03 | Stop reason: SDUPTHER

## 2020-11-03 RX ORDER — ACETAMINOPHEN 325 MG/1
650 TABLET ORAL EVERY 4 HOURS PRN
COMMUNITY

## 2020-11-03 RX ORDER — ERTAPENEM 1 G/1
INJECTION, POWDER, LYOPHILIZED, FOR SOLUTION INTRAMUSCULAR; INTRAVENOUS
COMMUNITY
Start: 2020-08-31 | End: 2021-03-19

## 2020-11-03 RX ORDER — DOCUSATE SODIUM 100 MG/1
100 CAPSULE, LIQUID FILLED ORAL 2 TIMES DAILY
COMMUNITY
End: 2021-04-13

## 2020-11-03 RX ORDER — BISACODYL 10 MG
10 SUPPOSITORY, RECTAL RECTAL DAILY
COMMUNITY

## 2020-11-03 RX ORDER — METOPROLOL TARTRATE 50 MG/1
50 TABLET, FILM COATED ORAL 2 TIMES DAILY
COMMUNITY
Start: 2020-10-23

## 2020-11-03 RX ORDER — SENNA PLUS 8.6 MG/1
8.6 TABLET ORAL 2 TIMES DAILY
COMMUNITY
Start: 2019-11-18 | End: 2020-11-17

## 2020-11-03 RX ORDER — OXYBUTYNIN CHLORIDE 5 MG/1
5 TABLET ORAL 3 TIMES DAILY
COMMUNITY
Start: 2019-11-20 | End: 2021-04-05

## 2020-11-03 RX ORDER — HYOSCYAMINE SULFATE 16 OZ
SOLUTION MISCELLANEOUS
COMMUNITY
End: 2021-03-19

## 2020-11-03 RX ORDER — SODIUM PHOSPHATE, DIBASIC AND SODIUM PHOSPHATE, MONOBASIC 7; 19 G/133ML; G/133ML
1 ENEMA RECTAL
COMMUNITY

## 2020-11-03 RX ORDER — ARGININE/GLUTAMINE/CALCIUM BMB 7G-7G-1.5G
POWDER IN PACKET (EA) ORAL
COMMUNITY

## 2020-11-03 RX ORDER — BIOTIN 10000 MCG
10 CAPSULE ORAL DAILY
COMMUNITY

## 2020-11-03 RX ORDER — POLYETHYLENE GLYCOL 3350 17 G/17G
17 POWDER, FOR SOLUTION ORAL DAILY
COMMUNITY
End: 2021-03-19

## 2020-11-03 RX ORDER — SERTRALINE HYDROCHLORIDE 100 MG/1
100 TABLET, FILM COATED ORAL DAILY
COMMUNITY
Start: 2019-11-25 | End: 2020-11-24

## 2020-11-03 RX ORDER — SACCHAROMYCES BOULARDII 250 MG
250 CAPSULE ORAL 2 TIMES DAILY
COMMUNITY

## 2020-11-03 RX ORDER — MIRTAZAPINE 15 MG/1
7.5 TABLET, FILM COATED ORAL
COMMUNITY
Start: 2020-10-23

## 2020-11-03 RX ORDER — PSYLLIUM HUSK 0.4 G
0.52 CAPSULE ORAL DAILY
COMMUNITY

## 2020-11-10 ENCOUNTER — NURSING HOME VISIT (OUTPATIENT)
Dept: WOUND CARE | Facility: HOSPITAL | Age: 70
End: 2020-11-10
Payer: MEDICARE

## 2020-11-10 DIAGNOSIS — L89.324 PRESSURE INJURY OF LEFT BUTTOCK, STAGE 4 (HCC): Primary | ICD-10-CM

## 2020-11-10 DIAGNOSIS — G82.20 PARAPLEGIA FOLLOWING SPINAL CORD INJURY (HCC): ICD-10-CM

## 2020-11-10 PROCEDURE — 99335 PR DOM/R-HOME E/M EST PT LW MOD SEVERITY 25 MINUTES: CPT | Performed by: NURSE PRACTITIONER

## 2020-11-17 ENCOUNTER — NURSING HOME VISIT (OUTPATIENT)
Dept: WOUND CARE | Facility: HOSPITAL | Age: 70
End: 2020-11-17
Payer: MEDICARE

## 2020-11-17 DIAGNOSIS — L89.324 PRESSURE INJURY OF LEFT BUTTOCK, STAGE 4 (HCC): Primary | ICD-10-CM

## 2020-11-17 DIAGNOSIS — G82.20 PARAPLEGIA FOLLOWING SPINAL CORD INJURY (HCC): ICD-10-CM

## 2020-11-17 PROCEDURE — 99335 PR DOM/R-HOME E/M EST PT LW MOD SEVERITY 25 MINUTES: CPT | Performed by: NURSE PRACTITIONER

## 2020-11-18 ENCOUNTER — NURSING HOME VISIT (OUTPATIENT)
Dept: GERIATRICS | Facility: OTHER | Age: 70
End: 2020-11-18
Payer: MEDICARE

## 2020-11-18 DIAGNOSIS — R19.5 LOOSE STOOLS: Primary | ICD-10-CM

## 2020-11-18 DIAGNOSIS — F32.89 OTHER DEPRESSION: ICD-10-CM

## 2020-11-18 PROCEDURE — 99308 SBSQ NF CARE LOW MDM 20: CPT | Performed by: NURSE PRACTITIONER

## 2020-11-19 PROBLEM — R19.5 LOOSE STOOLS: Status: ACTIVE | Noted: 2020-11-19

## 2020-11-24 ENCOUNTER — NURSING HOME VISIT (OUTPATIENT)
Dept: WOUND CARE | Facility: HOSPITAL | Age: 70
End: 2020-11-24
Payer: MEDICARE

## 2020-11-24 DIAGNOSIS — L89.324 PRESSURE INJURY OF LEFT BUTTOCK, STAGE 4 (HCC): Primary | ICD-10-CM

## 2020-11-24 PROCEDURE — 99335 PR DOM/R-HOME E/M EST PT LW MOD SEVERITY 25 MINUTES: CPT | Performed by: NURSE PRACTITIONER

## 2020-12-01 ENCOUNTER — NURSING HOME VISIT (OUTPATIENT)
Dept: WOUND CARE | Facility: HOSPITAL | Age: 70
End: 2020-12-01
Payer: MEDICARE

## 2020-12-01 DIAGNOSIS — G82.20 PARAPLEGIA FOLLOWING SPINAL CORD INJURY (HCC): ICD-10-CM

## 2020-12-01 DIAGNOSIS — L89.324 PRESSURE INJURY OF LEFT BUTTOCK, STAGE 4 (HCC): Primary | ICD-10-CM

## 2020-12-01 PROCEDURE — 99335 PR DOM/R-HOME E/M EST PT LW MOD SEVERITY 25 MINUTES: CPT | Performed by: NURSE PRACTITIONER

## 2020-12-08 ENCOUNTER — NURSING HOME VISIT (OUTPATIENT)
Dept: WOUND CARE | Facility: HOSPITAL | Age: 70
End: 2020-12-08
Payer: MEDICARE

## 2020-12-08 DIAGNOSIS — G82.20 PARAPLEGIA FOLLOWING SPINAL CORD INJURY (HCC): ICD-10-CM

## 2020-12-08 DIAGNOSIS — L89.324 PRESSURE INJURY OF LEFT BUTTOCK, STAGE 4 (HCC): Primary | ICD-10-CM

## 2020-12-08 PROCEDURE — 99335 PR DOM/R-HOME E/M EST PT LW MOD SEVERITY 25 MINUTES: CPT | Performed by: NURSE PRACTITIONER

## 2020-12-15 ENCOUNTER — NURSING HOME VISIT (OUTPATIENT)
Dept: WOUND CARE | Facility: HOSPITAL | Age: 70
End: 2020-12-15
Payer: MEDICARE

## 2020-12-15 DIAGNOSIS — G82.20 PARAPLEGIA FOLLOWING SPINAL CORD INJURY (HCC): ICD-10-CM

## 2020-12-15 DIAGNOSIS — L89.324 PRESSURE INJURY OF LEFT BUTTOCK, STAGE 4 (HCC): Primary | ICD-10-CM

## 2020-12-15 PROCEDURE — 99335 PR DOM/R-HOME E/M EST PT LW MOD SEVERITY 25 MINUTES: CPT | Performed by: NURSE PRACTITIONER

## 2020-12-22 ENCOUNTER — NURSING HOME VISIT (OUTPATIENT)
Dept: WOUND CARE | Facility: HOSPITAL | Age: 70
End: 2020-12-22
Payer: MEDICARE

## 2020-12-22 DIAGNOSIS — L89.324 PRESSURE INJURY OF LEFT BUTTOCK, STAGE 4 (HCC): Primary | ICD-10-CM

## 2020-12-22 PROCEDURE — 99335 PR DOM/R-HOME E/M EST PT LW MOD SEVERITY 25 MINUTES: CPT | Performed by: NURSE PRACTITIONER

## 2020-12-29 ENCOUNTER — NURSING HOME VISIT (OUTPATIENT)
Dept: WOUND CARE | Facility: HOSPITAL | Age: 70
End: 2020-12-29
Payer: MEDICARE

## 2020-12-29 DIAGNOSIS — L89.324 PRESSURE INJURY OF LEFT BUTTOCK, STAGE 4 (HCC): Primary | ICD-10-CM

## 2020-12-29 DIAGNOSIS — G82.20 PARAPLEGIA FOLLOWING SPINAL CORD INJURY (HCC): ICD-10-CM

## 2020-12-29 PROCEDURE — 99335 PR DOM/R-HOME E/M EST PT LW MOD SEVERITY 25 MINUTES: CPT | Performed by: NURSE PRACTITIONER

## 2021-01-05 ENCOUNTER — NURSING HOME VISIT (OUTPATIENT)
Dept: WOUND CARE | Facility: HOSPITAL | Age: 71
End: 2021-01-05
Payer: MEDICARE

## 2021-01-05 DIAGNOSIS — L89.324 PRESSURE INJURY OF LEFT BUTTOCK, STAGE 4 (HCC): Primary | ICD-10-CM

## 2021-01-05 PROCEDURE — 99335 PR DOM/R-HOME E/M EST PT LW MOD SEVERITY 25 MINUTES: CPT | Performed by: NURSE PRACTITIONER

## 2021-01-05 NOTE — ASSESSMENT & PLAN NOTE
- wound increase in size  - have a follow up appointment with the plastic surgeon this week  - continue to provide local wound care - iodoform

## 2021-01-05 NOTE — LETTER
Patient:  Zion Cornelius   1950           SVETLANA Barnett saw Zion Cornelius for a wound care visit on 1/5/2021  See below for information relating to this visit  Chief Complaint   Patient presents with    Follow Up Wound Care Visit     left buttock        Assessment/Plan:  1  Pressure injury of 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 ([facility])  Assessment & Plan:  - wound increase in size  - have a follow up appointment with the plastic surgeon this week  - continue to provide local wound care - iodoform    Orders:  -     Wound cleansing and dressings; Future           Orders:  Zion Cornelius  1950  Orders Placed This Encounter   Procedures    Wound cleansing and dressings     Wound:  Left buttock  Cleanse with NSS  Apply non-sting skin prep to periwound area  Gently fill the wound cavity with iodoform packing strip ( please tape the tail of the strip in the ABD pad ) then cover with ABD and secure with mepilex tape  Frequency : daily and prn for soiling  Offload all wounds  Turn and reposition frequently, minimum of every two hours  Increase protein intake as per RD recommendation  Monitor for any sign of infection or worsening, inform PCP  Please schedule follow up with the plastic surgeon for non -healing wound     Standing Status:   Future     Standing Expiration Date:   1/5/2022         Follow Up:  Return in about 1 week (around 1/12/2021)  [address] hours are 8:00 am - 4:30 pm Monday through Friday  The center phone number is [phone]  You can also contact me directly thru my email at [facility]  [email or web address] or thru tiger text  If it is an emergency, please contact the PCP or patient's attending physician in your facility       Sincerely,    Electronically signed by SVETLANA Barnett    Patient : Zion Cornelius    1950

## 2021-01-05 NOTE — PROGRESS NOTES
[address] [facility]   [facility]      Patient ID: Austin Floyd is a 72 y o  female Date of Birth 1950     Location of Service: [facility]    Performed wound round with: Wound team      Chief Complaint   Patient presents with    Follow Up Wound Care Visit     left buttock       Wound Instructions:  Orders Placed This Encounter   Procedures    Wound cleansing and dressings     Wound:  Left buttock  Cleanse with NSS  Apply non-sting skin prep to periwound area  Gently fill the wound cavity with iodoform packing strip ( please tape the tail of the strip in the ABD pad ) then cover with ABD and secure with mepilex tape  Frequency : daily and prn for soiling  Offload all wounds  Turn and reposition frequently, minimum of every two hours  Increase protein intake as per RD recommendation  Monitor for any sign of infection or worsening, inform PCP  Please schedule follow up with the plastic surgeon for non -healing wound     Standing Status:   Future     Standing Expiration Date:   1/5/2022       Allergies  Latex and Other      Assessment & Plan:  1  Pressure injury of 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 ([facility])  Assessment & Plan:  - wound increase in size  - have a follow up appointment with the plastic surgeon this week  - continue to provide local wound care - iodoform    Orders:  -     Wound cleansing and dressings; Future           Subjective: This is a follow up of the wound on the left buttock  This is a chronic wound and is under the care of the plastic surgeon  Etiology - pressure ulcer  Severity - mild, stable  Current treatment - iodosorb packing, no significant changes  Denies pain or fever  Patient will have a follow up appointment with plastic surgeon      Patient's care was coordinated with nursing facility staff  Recent vitals, labs and updated medications were reviewed on EMR or chart system of facility   Past Medical, surgical, social, medication and allergy history and patient's previous records were reviewed and updated as appropriate:     Patient Active Problem List   Diagnosis    Neurogenic bladder    [facility]omyelitis ([facility]    Depression    [facility] thrombosis) [facility]    Heart murmur    HTN (hypertension)    Hyperlipidemia    Hyponatremia    Pressure ulcer of contiguous region involving left buttock and hip, unstageable ([facility])    Kidney lesion, native, right    Neurogenic bowel    Paralysis ([address] )    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[facility] )    Parkinson's disease ([facility]    Postoperative anemia due to acute blood loss    Pressure injury of deep tissue    Seizure disorder ([facility])    Protein-calorie malnutrition ([facility])    Ambulatory dysfunction    Chronic right shoulder pain    Cellulitis    CHF (congestive heart failure) ([facility])    Supratherapeutic INR    Acute cystitis with hematuria    Fever    Sepsis ([facility]    Septic arthritis of hip ([facility])    Atrial fibrillation with RVR ([facility])    Chronic anticoagulation    Pressure injury of 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 ([facility])    Loose stools     Past Medical History:   Diagnosis Date    Back pain     Congestive heart failure ([facility])     Depressive disorder     Hypertension     Neurogenic bladder     Open wound of leg     Osteoarthritis     Osteomyelitis ([facility]     Paraplegia ([facility]     Seizure ([facility]     Thrombosis     Ulcer of ankle ([facility] )     Urinary retention      Past Surgical History:   Procedure Laterality Date    CYSTOSCOPY      SKIN GRAFT       Social History     Socioeconomic History    Marital status: /Civil Union     Spouse name: None    Number of children: None    Years of education: None    Highest education level: None   Occupational History    None   Social Needs    Financial resource strain: None    Food insecurity     Worry: None     Inability: None    Transportation needs     Medical: None     Non-medical: None   Tobacco Use    Smoking status: Never Smoker  Smokeless tobacco: Never Used   Substance and Sexual Activity    Alcohol use: No    Drug use: No    Sexual activity: None   Lifestyle    Physical activity     Days per week: None     Minutes per session: None    Stress: None   Relationships    Social connections     Talks on phone: None     Gets together: None     Attends Taoist service: None     Active member of club or organization: None     Attends meetings of clubs or organizations: None     Relationship status: None    Intimate partner violence     Fear of current or ex partner: None     Emotionally abused: None     Physically abused: None     Forced sexual activity: None   Other Topics Concern    None   Social History Narrative    None        Current Outpatient Medications:     acetaminophen (TYLENOL) 325 mg tablet, Take 650 mg by mouth every 4 (four) hours as needed, Disp: , Rfl:     amLODIPine (NORVASC) 5 mg tablet, Take 5 mg by mouth daily, Disp: , Rfl:     amLODIPine-benazepril (LOTREL) 10-20 MG per capsule, Take by mouth , Disp: , Rfl:     atorvastatin (LIPITOR) 20 mg tablet, Take 20 mg by mouth daily , Disp: , Rfl:     Biotin 10 MG CAPS, Take 10 mg by mouth daily, Disp: , Rfl:     bisacodyl (Dulcolax) 10 mg suppository, Insert 10 mg into the rectum daily, Disp: , Rfl:     carbidopa-levodopa (SINEMET)  mg per tablet, Take 1 tablet by mouth 4 (four) times a day, Disp: , Rfl:     Cranberry 450 MG TABS, Take 450 mg by mouth, Disp: , Rfl:     Cyanocobalamin-Salcaprozate (ELIGEN B12) 1000-100 MCG-MG TABS, Take 500 mcg/day by mouth , Disp: , Rfl:     docusate sodium (COLACE) 100 mg capsule, Take 100 mg by mouth 2 (two) times a day, Disp: , Rfl:     econazole nitrate 1 % cream, , Disp: , Rfl:     ertapenem (INVanz) 1 g, , Disp: , Rfl:     escitalopram (LEXAPRO) 10 mg tablet, Take 10 mg by mouth daily , Disp: , Rfl:     magnesium hydroxide (MILK OF MAGNESIA) 400 mg/5 mL oral suspension, Take by mouth daily as needed for constipation, Disp: , Rfl:     metoprolol tartrate (LOPRESSOR) 50 mg tablet, , Disp: , Rfl:     metroNIDAZOLE (METROCREAM) 0 75 % cream, , Disp: , Rfl:     miconazole (MICOTIN) 2 % powder, Apply 1 application topically 2 (two) times a day, Disp: , Rfl:     mirtazapine (REMERON) 15 mg tablet, , Disp: , Rfl:     Multiple Vitamin (DAILY VALUE MULTIVITAMIN) TABS, Take 1 tablet by mouth daily , Disp: , Rfl:     Multiple Vitamins-Minerals (HAIR SKIN NAILS PO), Take 3 tablets by mouth daily, Disp: , Rfl:     Nutritional Supplements (Micha) PACK, Take by mouth, Disp: , Rfl:     Omega-3 Fatty Acids (FISH OIL) 1,000 mg, Take 1,000 mg by mouth daily , Disp: , Rfl:     oxybutynin (DITROPAN) 5 mg tablet, Take 5 mg by mouth Three times a day, Disp: , Rfl:     oxyCODONE (ROXICODONE) 5 mg immediate release tablet, Take 1 tablet (5 mg total) by mouth every 6 (six) hours as needed for moderate painMax Daily Amount: 20 mg, Disp: 15 tablet, Rfl: 0    phenytoin (DILANTIN) 100 mg ER capsule, Take 100 mg by mouth every 12 (twelve) hours , Disp: , Rfl:     polyethylene glycol (GLYCOLAX) 17 GM/SCOOP powder, Take 17 g by mouth daily, Disp: , Rfl:     potassium chloride (K-DUR,KLOR-CON) 10 mEq tablet, , Disp: , Rfl:     psyllium (Metamucil) 0 52 g capsule, Take 0 52 g by mouth daily, Disp: , Rfl:     saccharomyces boulardii (Florastor) 250 mg capsule, Take 250 mg by mouth 2 (two) times a day, Disp: , Rfl:     senna (SENOKOT) 8 6 MG tablet, Take 8 6 mg by mouth 2 (two) times a day, Disp: , Rfl:     silver sulfadiazine (SILVADENE,SSD) 1 % cream, , Disp: , Rfl:     Sodium Chloride Flush (Normal Saline Flush) 0 9 % SOLN, Infuse 10 mL into a venous catheter, Disp: , Rfl:     Sodium Hypochlorite (sodium hypochlorite) 0 25 % SOLN, Apply topically, Disp: , Rfl:     sodium phosphate-biphosphate (FLEET) 7-19 g 118 mL enema, Insert 1 enema into the rectum, Disp: , Rfl:     Tolnaftate (ANTIFUNGAL SPRAY POWDER EX), Apply 2 % topically, Disp: , Rfl:     Review of Systems   Constitutional: Negative  HENT: Negative  Eyes: Negative  Respiratory: Negative  Cardiovascular: Positive for leg swelling  Gastrointestinal: Negative  With suprapubic catheter   Endocrine: Negative  Genitourinary: Negative  Musculoskeletal: Positive for gait problem  Skin: Positive for wound  Neurological: Negative for dizziness and headaches  Psychiatric/Behavioral: Negative for behavioral problems  Objective: There were no vitals taken for this visit  Physical Exam  HENT:      Head: Normocephalic  Neck:      Musculoskeletal: Normal range of motion  Pulmonary:      Effort: Pulmonary effort is normal    Abdominal:      Palpations: Abdomen is soft  Tenderness: There is no abdominal tenderness  There is no guarding  Genitourinary:     Comments: With angel catheter, intact, draining well  Musculoskeletal: Normal range of motion  General: No tenderness  Right lower leg: No edema  Left lower leg: No edema  Skin:     General: Skin is warm  Findings: Lesion present  Comments: Location Left buttock wound bed -  4 x  5 x 7 5 cm , no undermining,  100 %granulation ( visible wound bed), exudate - mild amount of blood, no malodor ( assess after dressing removal and cleansing), wound edge - attached to base, periwound - intact  No localized sign of infection, Denies pain  Neurological:      Mental Status: She is alert and oriented to person, place, and time  Sensory: Sensory deficit present  Gait: Gait abnormal       Comments: paraplegia   Psychiatric:         Mood and Affect: Mood normal          Behavior: Behavior normal               Procedures               Coordination of Care: Wound team aware of the treatment plan  Follow up :  Return in about 1 week (around 1/12/2021)        SVETLANA Jensen

## 2021-01-05 NOTE — PATIENT INSTRUCTIONS
Orders Placed This Encounter   Procedures    Wound cleansing and dressings     Wound:  Left buttock  Cleanse with NSS  Apply non-sting skin prep to periwound area  Gently fill the wound cavity with iodoform packing strip ( please tape the tail of the strip in the ABD pad ) then cover with ABD and secure with mepilex tape    Frequency : daily and prn for soiling  Offload all wounds  Turn and reposition frequently, minimum of every two hours  Increase protein intake as per RD recommendation  Monitor for any sign of infection or worsening, inform PCP  Please schedule follow up with the plastic surgeon for non -healing wound     Standing Status:   Future     Standing Expiration Date:   1/5/2022

## 2021-01-06 ENCOUNTER — NURSING HOME VISIT (OUTPATIENT)
Dept: GERIATRICS | Facility: OTHER | Age: 71
End: 2021-01-06
Payer: MEDICARE

## 2021-01-06 DIAGNOSIS — F32.89 OTHER DEPRESSION: ICD-10-CM

## 2021-01-06 DIAGNOSIS — L89.324 PRESSURE INJURY OF LEFT BUTTOCK, STAGE 4 (HCC): Primary | ICD-10-CM

## 2021-01-06 DIAGNOSIS — E78.5 HYPERLIPIDEMIA, UNSPECIFIED HYPERLIPIDEMIA TYPE: ICD-10-CM

## 2021-01-06 DIAGNOSIS — E87.1 HYPONATREMIA: ICD-10-CM

## 2021-01-06 DIAGNOSIS — I82.409 DEEP VEIN THROMBOSIS (DVT) OF LOWER EXTREMITY, UNSPECIFIED CHRONICITY, UNSPECIFIED LATERALITY, UNSPECIFIED VEIN (HCC): ICD-10-CM

## 2021-01-06 DIAGNOSIS — I50.9 CHRONIC CONGESTIVE HEART FAILURE, UNSPECIFIED HEART FAILURE TYPE (HCC): ICD-10-CM

## 2021-01-06 DIAGNOSIS — D64.9 CHRONIC ANEMIA: ICD-10-CM

## 2021-01-06 PROCEDURE — 99309 SBSQ NF CARE MODERATE MDM 30: CPT | Performed by: FAMILY MEDICINE

## 2021-01-06 NOTE — ASSESSMENT & PLAN NOTE
Has flat affect and was giving one word answers initially but after some time began to be more talkative  Was not tearful during encounter  Plan:   - continue sertraline 100mg qd  If poorly controlled will increase dose to 150mg qd  - pt on low-dose mirtazepine (7 5mg qd) to treat insomnia and improve appetite

## 2021-01-06 NOTE — ASSESSMENT & PLAN NOTE
On atorvastatin 20mg qd  No current complaints of myalgia  Plan: lipid panel to be done on 1/20/2020 (with all other labs)  Will follow up results and adjust medication dose accordingly (if necessary)

## 2021-01-06 NOTE — ASSESSMENT & PLAN NOTE
Wt Readings from Last 3 Encounters:   03/10/20 95 3 kg (210 lb)   05/16/19 97 9 kg (215 lb 13 3 oz)   05/03/19 86 2 kg (190 lb)     Last TTE shows EF of 65-70%  No LE pitting edema noted on exam  Pt denies SOB and orthopnea at this time  Speaking in complete sentences while laying flat  Chronically on lasix and potassium supplementation  Plan: continue lasix 20mg qd, monitor weight, monitor for signs of volume overload  CMP ordered, follow-up potassium levels and renal function

## 2021-01-06 NOTE — ASSESSMENT & PLAN NOTE
Pt would not allow me to examine the area but reports that it was "deeper than before, it's now at 7 instead of 2 "     Plan:  - wound care on board, appreciate input  - continue local wound care with iodoform  - plastic surgery consult in, appointment tomorrow  Will f/u with recommendations  - monitor for fever, night sweats, and visible signs of infection of wound  If appears infected order CBC to assess wbc count

## 2021-01-06 NOTE — ASSESSMENT & PLAN NOTE
Stable  No LE erythema or pitting edema on exam  No chest pain, SOB today       Plan: continue warfarin 6mg qd, INR q 2 weeks next 1/20/2021
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
## 2021-01-06 NOTE — ASSESSMENT & PLAN NOTE
2/2 chronic disease vs iron def  Last cbc (9/2020) shows Hb 9 6  pt currently asymptomatic  Plan: CBC in 2 weeks with PT/INR  Will order iron panel at next visit

## 2021-01-06 NOTE — PROGRESS NOTES
[address]   [address] [facility] [place]  Progress Note    NAME: Gabriele Melchor  AGE: 72 y o  SEX: female [id number]    DATE OF ENCOUNTER: 1/6/2021    Code status:  full code    Assessment and Plan     Problem List Items Addressed This Visit        Cardiovascular and Mediastinum    DVT (deep venous thrombosis) ([facility])     Stable  No LE erythema or pitting edema on exam  No chest pain, SOB today  Plan: continue warfarin 6mg qd, INR q 2 weeks next 1/20/2021         CHF (congestive heart failure) ([facility] )     Wt Readings from Last 3 Encounters:   03/10/20 95 3 kg (210 lb)   05/16/19 97 9 kg (215 lb 13 3 oz)   05/03/19 86 2 kg (190 lb)     Last TTE shows EF of 65-70%  No LE pitting edema noted on exam  Pt denies SOB and orthopnea at this time  Speaking in complete sentences while laying flat  Chronically on lasix and potassium supplementation  Plan: continue lasix 20mg qd, monitor weight, monitor for signs of volume overload  CMP ordered, follow-up potassium levels and renal function  Musculoskeletal and Integument    Pressure injury of 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 ([facility]) - Primary     Pt would not allow me to examine the area but reports that it was "deeper than before, it's now at 7 instead of 2 "     Plan:  - wound care on board, appreciate input  - continue local wound care with iodoform  - plastic surgery consult in, appointment tomorrow  Will f/u with recommendations  - monitor for fever, night sweats, and visible signs of infection of wound  If appears infected order CBC to assess wbc count  Other    Depression     Has flat affect and was giving one word answers initially but after some time began to be more talkative  Was not tearful during encounter  Plan:   - continue sertraline 100mg qd  If poorly controlled will increase dose to 150mg qd  - pt on low-dose mirtazepine (7 5mg qd) to treat insomnia and improve appetite  Hyperlipidemia     On atorvastatin 20mg qd  No current complaints of myalgia  Plan: lipid panel to be done on 1/20/2020 (with all other labs)  Will follow up results and adjust medication dose accordingly (if necessary)  Hyponatremia     Last level in 9/2020 shows Na 134, mild hypotnatremia  Plan: CMP, f/u results  Chronic anemia     2/2 chronic disease vs iron def  Last cbc (9/2020) shows Hb 9 6  pt currently asymptomatic  Plan: CBC in 2 weeks with PT/INR  Will order iron panel at next visit  All medications and routine orders were reviewed and updated as needed  Plan discussed with: Patient    Chief Complaint     Seen for sacral ulcer at [address] [place] [address]    History of Present Illness     58-year-old female with paraplegia and CHF, depression, chronic anemia, hyperlipidemia, and DVT who now has a poorly healing sacral/buttock ulcer  She has been seen by wound care  She reports that her wound is now deeper than it was before    She also reports that tomorrow she has a visit with Plastic surgery and is frustrated that her wound is now worse  Furthermore, she admits to being sad but this has not worsened  No thoughts of harming self  Also states that her legs feel heavier than usual       Denies orthopnea, dyspnea, paroxysmal nocturnal dyspnea, fever, night sweats      HISTORY:  Past Medical History:   Diagnosis Date    Back pain     Congestive heart failure (HCC)     Depressive disorder     Hypertension     Neurogenic bladder     Open wound of leg     Osteoarthritis     Osteomyelitis (HCC)     Paraplegia (HCC)     Seizure ([address] )     Thrombosis     Ulcer of ankle ([address] )     Urinary retention      Family History   Problem Relation Age of Onset    Diabetes Father     Kidney cancer Father     Alzheimer's disease Mother     Bone cancer Paternal Uncle     Lung cancer Paternal Uncle      Social History     Socioeconomic History    Marital status: /Civil [employer]     Spouse name: Not on file    Number of children: Not on file    Years of education: Not on file    Highest education level: Not on file   Occupational History    Not on file   Social Needs    Financial resource strain: Not on file    Food insecurity     Worry: Not on file     Inability: Not on file    Transportation needs     Medical: Not on file     Non-medical: Not on file   Tobacco Use    Smoking status: Never Smoker    Smokeless tobacco: Never Used   Substance and Sexual Activity    Alcohol use: No    Drug use: No    Sexual activity: Not on file   Lifestyle    Physical activity     Days per week: Not on file     Minutes per session: Not on file    Stress: Not on file   Relationships    Social connections     Talks on phone: Not on file     Gets together: Not on file     Attends [employer]ational service: Not on file     Active member of club or organization: Not on file     Attends meetings of clubs or organizations: Not on file     Relationship status: Not on file    Intimate partner violence     Fear of current or ex partner: Not on file     Emotionally abused: Not on file     Physically abused: Not on file     Forced sexual activity: Not on file   Other Topics Concern    Not on file   Social History Narrative    Not on file       Allergies: Allergies   Allergen Reactions    Latex     Other Other (See Comments)     Irritation  Latex gloves OK per pt  Review of Systems     Review of Systems   Constitutional: Negative for chills and fever  HENT: Negative for rhinorrhea  Respiratory: Negative for cough, chest tightness and shortness of breath  Cardiovascular: Negative for chest pain  Gastrointestinal: Negative for abdominal pain  Musculoskeletal: Negative for back pain  Skin: Negative for rash  Neurological: Negative for dizziness, seizures and light-headedness  Psychiatric/Behavioral: Positive for dysphoric mood   Negative for self-injury and suicidal ideas        Medications and orders     All medications reviewed and updated in Nursing Home EMR  Objective     Vitals:  Temperature 97 3°, heart rate 60, respiratory rate 18, blood pressure 116/68    Physical Exam  Constitutional:       General: She is not in acute distress  Appearance: She is obese  She is ill-appearing  She is not toxic-appearing or diaphoretic  Comments: Lying flat in bed, has flat affect  Appears chronically ill  HENT:      Head: Normocephalic  Nose: Nose normal       Mouth/Throat:      Mouth: Mucous membranes are moist    Eyes:      Extraocular Movements: Extraocular movements intact  Pupils: Pupils are equal, round, and reactive to light  Cardiovascular:      Rate and Rhythm: Normal rate and regular rhythm  Pulses: Normal pulses  Heart sounds: Normal heart sounds  No murmur  Pulmonary:      Effort: Pulmonary effort is normal       Breath sounds: Normal breath sounds  No wheezing, rhonchi or rales  Abdominal:      General: Bowel sounds are normal       Palpations: Abdomen is soft  Musculoskeletal:      Right lower leg: No edema  Left lower leg: No edema  Comments: No pitting edema of lower extremities bilaterally  Appears to have foot deformities  Skin:     General: Skin is warm and dry  Comments: Dry, scaly skin on feet  Scars noted on bilateral lower extremities, no scabbing or new wounds  No lower extremity erythema noted  Neurological:      Mental Status: She is alert and oriented to person, place, and time  Psychiatric:      Comments: Flat affect  Initially with short but after some time became talkative  Pertinent Laboratory/Diagnostic Studies: The following labs/studies were reviewed please see chart or hospital paperwork for details  (below labs from 09/2020 in paper chart)    CBC = hemoglobin 9 6, WBC 4, platelets 580   CMP = sodium 134, potassium 4 3, calcium 8 3, albumin 2 6, total protein 7      - Counseling Documentation: patient was counseled regarding: impressions

## 2021-01-12 ENCOUNTER — NURSING HOME VISIT (OUTPATIENT)
Dept: WOUND CARE | Facility: HOSPITAL | Age: 71
End: 2021-01-12
Payer: MEDICARE

## 2021-01-12 DIAGNOSIS — L89.324 PRESSURE INJURY OF LEFT BUTTOCK, STAGE 4 (HCC): Primary | ICD-10-CM

## 2021-01-12 PROCEDURE — 99335 PR DOM/R-HOME E/M EST PT LW MOD SEVERITY 25 MINUTES: CPT | Performed by: NURSE PRACTITIONER

## 2021-01-12 NOTE — ASSESSMENT & PLAN NOTE
- Left buttock  - Patient was seen by plastic surgeon yesterday, recommended to continue local wound care and follow up in 6 weeks  - continue iodoform packing  - no sign of infection  - transfer of care to plastic surgeon  - sign off

## 2021-01-12 NOTE — LETTER
Patient:  Rosario Miller   1950           SVETLANA Rivera saw Rosario Miller for a wound care visit on 1/12/2021  See below for information relating to this visit  Chief Complaint   Patient presents with    Follow Up Wound Care Visit     Left buttock        Assessment/Plan:  1  Pressure injury of 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 (HCC)  Assessment & Plan:  - Left buttock  - Patient was seen by plastic surgeon yesterday, recommended to continue local wound care and follow up in 6 weeks  - continue iodoform packing  - no sign of infection  - transfer of care to plastic surgeon  - sign off  Orders:  -     Wound cleansing and dressings; Future           Orders:  Rosario Miller  1950  Orders Placed This Encounter   Procedures    Wound cleansing and dressings     Wound:  Left buttock  Cleanse with NSS  Apply non-sting skin prep to periwound area  Gently fill the wound cavity with iodoform packing strip ( please tape the tail of the strip in the ABD pad ) then cover with ABD and secure with mepilex tape  Frequency : daily and prn for soiling  Offload all wounds  Turn and reposition frequently, minimum of every two hours  Increase protein intake as per RD recommendation  Monitor for any sign of infection or worsening, inform PCP     Standing Status:   Future     Standing Expiration Date:   1/12/2022         Follow Up:  Return if symptoms worsen or fail to improve  [address] hours are 8:00 am - 4:30 pm Monday through Friday  The center phone number is [phone]  You can also contact me directly thru my email at [facility]  [email or web address]  [email or web address] or thru tiger text  If it is an emergency, please contact the PCP or patient's attending physician in your facility       Sincerely,    Electronically signed by SVETLANA Rivera    Patient : Rosario Miller    1950

## 2021-01-12 NOTE — PATIENT INSTRUCTIONS
Orders Placed This Encounter   Procedures    Wound cleansing and dressings     Wound:  Left buttock  Cleanse with NSS  Apply non-sting skin prep to periwound area  Gently fill the wound cavity with iodoform packing strip ( please tape the tail of the strip in the ABD pad ) then cover with ABD and secure with mepilex tape    Frequency : daily and prn for soiling  Offload all wounds  Turn and reposition frequently, minimum of every two hours  Increase protein intake as per RD recommendation  Monitor for any sign of infection or worsening, inform PCP     Standing Status:   Future     Standing Expiration Date:   1/12/2022

## 2021-01-12 NOTE — PROGRESS NOTES
[address] MANAGEMENT   AND HYPERBARIC MEDICINE CENTER      Patient ID: Riri Plascencia is a 72 y o  female Date of Birth 1950     Location of Service: [facility]    Performed wound round with: Wound team      Chief Complaint   Patient presents with    Follow Up Wound Care Visit     Left buttock       Wound Instructions:  Orders Placed This Encounter   Procedures    Wound cleansing and dressings     Wound:  Left buttock  Cleanse with NSS  Apply non-sting skin prep to periwound area  Gently fill the wound cavity with iodoform packing strip ( please tape the tail of the strip in the ABD pad ) then cover with ABD and secure with mepilex tape  Frequency : daily and prn for soiling  Offload all wounds  Turn and reposition frequently, minimum of every two hours  Increase protein intake as per RD recommendation  Monitor for any sign of infection or worsening, inform PCP     Standing Status:   Future     Standing Expiration Date:   1/12/2022       Allergies  Latex and Other      Assessment & Plan:  1  Pressure injury of 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 ([facility])  Assessment & Plan:  - Left buttock  - Patient was seen by plastic surgeon yesterday, recommended to continue local wound care and follow up in 6 weeks  - continue iodoform packing  - no sign of infection  - transfer of care to plastic surgeon  - sign off  Orders:  -     Wound cleansing and dressings; Future           Subjective: This is a follow up of the wound on the left buttock  This is a chronic wound and is under the care of the plastic surgeon  Etiology - pressure ulcer  Severity - mild, stable  Current treatment - iodosorb packing, no significant changes  Denies pain or fever  Patient had a follow up appointment with plastic surgeon and recommended to continue the local wound care  Patient's care was coordinated with nursing facility staff   Recent vitals, labs and updated medications were reviewed on EMR or chart system of facility   Past Medical, surgical, social, medication and allergy history and patient's previous records were reviewed and updated as appropriate:     Patient Active Problem List   Diagnosis    Neurogenic bladder    Chronic osteomyelitis ([place] [address] )    Depression    DVT [facility]deep venous thrombosis) ([facility])    Heart murmur    HTN (hypertension)    Hyperlipidemia    Hyponatremia    Pressure ulcer of contiguous region involving left buttock and hip, unstageable ([facility] )    Kidney lesion, native, right    Neurogenic bowel    Paralysis ([place] [address] )    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[place] [address] )    Parkinson's disease ([place] [address] )    Postoperative anemia due to acute blood loss    Pressure injury of deep tissue    Seizure disorder ([facility])    Protein-calorie malnutrition ([place] [address] )    Ambulatory dysfunction    Chronic right shoulder pain    Cellulitis    CHF (congestive heart failure) ([facility])    Supratherapeutic INR    Acute cystitis with hematuria    Fever    Sepsis ([place] [address] )    Septic arthritis of hip ([place] [address] )    Atrial fibrillation with RVR ([facility])    Chronic anticoagulation    Pressure injury of 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 ([facility])    Loose stools    Chronic anemia     Past Medical History:   Diagnosis Date    Back pain     Congestive heart failure ([facility])     Depressive disorder     Hypertension     Neurogenic bladder     Open wound of leg     Osteoarthritis     Osteomyelitis ([place] [address] )     Paraplegia ([place] [address] )     Seizure ([place] [address] )     Thrombosis     Ulcer of ankle ([place] [address] )     Urinary retention      Past Surgical History:   Procedure Laterality Date    CYSTOSCOPY      SKIN GRAFT       Social History     Socioeconomic History    Marital status: /Civil Union     Spouse name: None    Number of children: None    Years of education: None    Highest education level: None   Occupational History    None   Social Needs    Financial resource strain: None    Food insecurity     Worry: None     Inability: None    Transportation needs Medical: None     Non-medical: None   Tobacco Use    Smoking status: Never Smoker    Smokeless tobacco: Never Used   Substance and Sexual Activity    Alcohol use: No    Drug use: No    Sexual activity: None   Lifestyle    Physical activity     Days per week: None     Minutes per session: None    Stress: None   Relationships    Social connections     Talks on phone: None     Gets together: None     Attends Faith service: None     Active member of club or organization: None     Attends meetings of clubs or organizations: None     Relationship status: None    Intimate partner violence     Fear of current or ex partner: None     Emotionally abused: None     Physically abused: None     Forced sexual activity: None   Other Topics Concern    None   Social History Narrative    None        Current Outpatient Medications:     acetaminophen (TYLENOL) 325 mg tablet, Take 650 mg by mouth every 4 (four) hours as needed, Disp: , Rfl:     amLODIPine (NORVASC) 5 mg tablet, Take 5 mg by mouth daily, Disp: , Rfl:     amLODIPine-benazepril (LOTREL) 10-20 MG per capsule, Take by mouth , Disp: , Rfl:     atorvastatin (LIPITOR) 20 mg tablet, Take 20 mg by mouth daily , Disp: , Rfl:     Biotin 10 MG CAPS, Take 10 mg by mouth daily, Disp: , Rfl:     bisacodyl (Dulcolax) 10 mg suppository, Insert 10 mg into the rectum daily, Disp: , Rfl:     carbidopa-levodopa (SINEMET)  mg per tablet, Take 1 tablet by mouth 4 (four) times a day, Disp: , Rfl:     Cranberry 450 MG TABS, Take 450 mg by mouth, Disp: , Rfl:     Cyanocobalamin-Salcaprozate (ELIGEN B12) 1000-100 MCG-MG TABS, Take 500 mcg/day by mouth , Disp: , Rfl:     docusate sodium (COLACE) 100 mg capsule, Take 100 mg by mouth 2 (two) times a day, Disp: , Rfl:     econazole nitrate 1 % cream, , Disp: , Rfl:     ertapenem (INVanz) 1 g, , Disp: , Rfl:     escitalopram (LEXAPRO) 10 mg tablet, Take 10 mg by mouth daily , Disp: , Rfl:     magnesium hydroxide (MILK OF MAGNESIA) 400 mg/5 mL oral suspension, Take by mouth daily as needed for constipation, Disp: , Rfl:     metoprolol tartrate (LOPRESSOR) 50 mg tablet, , Disp: , Rfl:     metroNIDAZOLE (METROCREAM) 0 75 % cream, , Disp: , Rfl:     miconazole (MICOTIN) 2 % powder, Apply 1 application topically 2 (two) times a day, Disp: , Rfl:     mirtazapine (REMERON) 15 mg tablet, , Disp: , Rfl:     Multiple Vitamin (DAILY VALUE MULTIVITAMIN) TABS, Take 1 tablet by mouth daily , Disp: , Rfl:     Multiple Vitamins-Minerals (HAIR SKIN NAILS PO), Take 3 tablets by mouth daily, Disp: , Rfl:     Nutritional Supplements (Micha) PACK, Take by mouth, Disp: , Rfl:     Omega-3 Fatty Acids (FISH OIL) 1,000 mg, Take 1,000 mg by mouth daily , Disp: , Rfl:     oxybutynin (DITROPAN) 5 mg tablet, Take 5 mg by mouth Three times a day, Disp: , Rfl:     oxyCODONE (ROXICODONE) 5 mg immediate release tablet, Take 1 tablet (5 mg total) by mouth every 6 (six) hours as needed for moderate painMax Daily Amount: 20 mg, Disp: 15 tablet, Rfl: 0    phenytoin (DILANTIN) 100 mg ER capsule, Take 100 mg by mouth every 12 (twelve) hours , Disp: , Rfl:     polyethylene glycol (GLYCOLAX) 17 GM/SCOOP powder, Take 17 g by mouth daily, Disp: , Rfl:     potassium chloride (K-DUR,KLOR-CON) 10 mEq tablet, , Disp: , Rfl:     psyllium (Metamucil) 0 52 g capsule, Take 0 52 g by mouth daily, Disp: , Rfl:     saccharomyces boulardii (Florastor) 250 mg capsule, Take 250 mg by mouth 2 (two) times a day, Disp: , Rfl:     senna (SENOKOT) 8 6 MG tablet, Take 8 6 mg by mouth 2 (two) times a day, Disp: , Rfl:     silver sulfadiazine (SILVADENE,SSD) 1 % cream, , Disp: , Rfl:     Sodium Chloride Flush (Normal Saline Flush) 0 9 % SOLN, Infuse 10 mL into a venous catheter, Disp: , Rfl:     Sodium Hypochlorite (sodium hypochlorite) 0 25 % SOLN, Apply topically, Disp: , Rfl:     sodium phosphate-biphosphate (FLEET) 7-19 g 118 mL enema, Insert 1 enema into the rectum, Disp: , Rfl:     Tolnaftate (ANTIFUNGAL SPRAY POWDER EX), Apply 2 % topically, Disp: , Rfl:     Review of Systems   Constitutional: Negative  HENT: Negative  Eyes: Negative  Respiratory: Negative  Cardiovascular: Positive for leg swelling  Gastrointestinal: Negative  With suprapubic catheter   Endocrine: Negative  Genitourinary: Negative  Musculoskeletal: Positive for gait problem  Skin: Positive for wound  Neurological: Negative for dizziness and headaches  Psychiatric/Behavioral: Negative for behavioral problems  Objective: There were no vitals taken for this visit  Physical Exam  HENT:      Head: Normocephalic  Neck:      Musculoskeletal: Normal range of motion  Pulmonary:      Effort: Pulmonary effort is normal    Abdominal:      Palpations: Abdomen is soft  Tenderness: There is no abdominal tenderness  There is no guarding  Genitourinary:     Comments: With angel catheter, intact, draining well  Musculoskeletal: Normal range of motion  General: No tenderness  Right lower leg: No edema  Left lower leg: No edema  Skin:     General: Skin is warm  Findings: Lesion present  Comments: Location Left buttock wound bed -  5 x  2 x 6 5 cm , no undermining,  100 %granulation ( visible wound bed), exudate - moderate amount of serosanguinous drainage, no malodor ( assess after dressing removal and cleansing), wound edge - attached to base, periwound - intact  No localized sign of infection, Denies pain  Neurological:      Mental Status: She is alert and oriented to person, place, and time  Sensory: Sensory deficit present  Gait: Gait abnormal       Comments: paraplegia   Psychiatric:         Mood and Affect: Mood normal          Behavior: Behavior normal               Procedures               Coordination of Care: Wound team aware of the treatment plan            Follow up :  Return if symptoms worsen or fail to jon Smith Tuan, CRNP

## 2021-02-03 ENCOUNTER — NURSING HOME VISIT (OUTPATIENT)
Dept: GERIATRICS | Facility: OTHER | Age: 71
End: 2021-02-03
Payer: MEDICARE

## 2021-02-03 DIAGNOSIS — Z00.00 HEALTHCARE MAINTENANCE: ICD-10-CM

## 2021-02-03 DIAGNOSIS — F32.89 OTHER DEPRESSION: ICD-10-CM

## 2021-02-03 DIAGNOSIS — I82.409 DEEP VEIN THROMBOSIS (DVT) OF LOWER EXTREMITY, UNSPECIFIED CHRONICITY, UNSPECIFIED LATERALITY, UNSPECIFIED VEIN (HCC): ICD-10-CM

## 2021-02-03 DIAGNOSIS — L89.324 PRESSURE INJURY OF LEFT BUTTOCK, STAGE 4 (HCC): Primary | ICD-10-CM

## 2021-02-03 PROCEDURE — 99309 SBSQ NF CARE MODERATE MDM 30: CPT | Performed by: FAMILY MEDICINE

## 2021-02-03 NOTE — PROGRESS NOTES
[address]   [address] [facility] Am[address] [place]  Progress Note    NAME: Zion Cornelius  AGE: 72 y o  SEX: female [id number]    DATE OF ENCOUNTER: 2/3/2021    Code status:  Full code      Assessment and Plan     Pressure injury of 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 ([address] )   Seen by wound care on   Prior to this was seen by Plastic surgery as well who recommended continued local wound care with f/u in 6 weeks  Wound care transferred care to plastic surgery and signed off (per last note)   Plan:  o Continue local wound care   o F/u with plastic surgery in 6 weeks  o Monitor for signs of infection      Depression  Well controlled with sertraline 100mg qd  Continue current medication regimen  Frequently assess status  DVT (deep venous thrombosis) (HCC)  INR therapeutic  Plan:  - INR q 2 weeks   - continue coumadin 6mg qd    Healthcare maintenance  Discussed screening: colonoscopy and mammogram  Pt refuses right now wants to focus on healing pressure ulcer  All medications and routine orders were reviewed and updated as needed  Plan discussed with: Patient    Chief Complaint     Seen at [address] [place] [address], pressure ulcers in patient with paraplegia     History of Present Illness     Zion Cornelius states she is feeling "okay " She is frustrated by the fact that the pressure ulcer on left buttock has not improved much but understands that plastic surgery is on board which she is happy about  Reports mood as being good  Denies feelings of sadness today  States "this accident happened 40+ years ago so I have to keep going " She is smiling throughout the encounter and has a positive attitude  No thoughts of harming self or others  Does reports that February is a hard month as this is the month in which her accident happened 40+ years ago and the month that he   3 years ago from esophageal cancer   She states she does not want to talk to a professional and "I'll be fine "     Furthermore she would like her protein levels checked more often so she can know if "it's good enough to heal "     No other complaints or concerns today       HISTORY:  Past Medical History:   Diagnosis Date    Back pain     Congestive heart failure (HCC)     Depressive disorder     Hypertension     Neurogenic bladder     Open wound of leg     Osteoarthritis     Osteomyelitis (HCC)     Paraplegia (HCC)     [facility]     Thrombosis     Ulcer of ankle ([facility] )     Urinary retention      Family History   Problem Relation Age of Onset    Diabetes Father     Kidney cancer Father     Alzheimer's disease Mother     Bone cancer Paternal Uncle     Lung cancer Paternal Uncle      Social History     Socioeconomic History    Marital status: /Civil Union     Spouse name: Not on file    Number of children: Not on file    Years of education: Not on file    Highest education level: Not on file   Occupational History    Not on file   Social Needs    Financial resource strain: Not on file    Food insecurity     Worry: Not on file     Inability: Not on file    Transportation needs     Medical: Not on file     Non-medical: Not on file   Tobacco Use    Smoking status: Never Smoker    Smokeless tobacco: Never Used   Substance and Sexual Activity    Alcohol use: No    Drug use: No    Sexual activity: Not on file   Lifestyle    Physical activity     Days per week: Not on file     Minutes per session: Not on file    Stress: Not on file   Relationships    Social connections     Talks on phone: Not on file     Gets together: Not on file     Attends Ep[employer]opalian service: Not on file     Active member of club or organization: Not on file     Attends meetings of clubs or organizations: Not on file     Relationship status: Not on file    Intimate partner violence     Fear of current or ex partner: Not on file     Emotionally abused: Not on file     Physically abused: Not on file     Forced sexual activity: Not on file   Other Topics Concern    Not on file   Social History Narrative    Not on file       Allergies: Allergies   Allergen Reactions    Latex     Other Other (See Comments)     Irritation  Latex gloves OK per pt  Review of Systems     Review of Systems   Constitutional: Negative for chills and fever  HENT: Negative for rhinorrhea  Eyes: Negative for visual disturbance  Respiratory: Negative for cough and chest tightness  Cardiovascular: Positive for leg swelling (no change from previous)  Negative for chest pain  Gastrointestinal: Negative for constipation and diarrhea  Musculoskeletal: Negative for arthralgias  Skin: Positive for wound (left buttock)  Neurological: Negative for headaches  Psychiatric/Behavioral: Negative for confusion, dysphoric mood and sleep disturbance  The patient is not nervous/anxious  All other systems reviewed and are negative  Medications and orders     All medications reviewed and updated in Nursing Home EMR  Objective     Vitals: 97 7F, HR 78, RR 12, 118/68, 94%    Physical Exam  Constitutional:       General: She is not in acute distress  Appearance: She is not diaphoretic  HENT:      Head: Normocephalic  Nose: Nose normal       Mouth/Throat:      Mouth: Mucous membranes are moist    Eyes:      Extraocular Movements: Extraocular movements intact  Pupils: Pupils are equal, round, and reactive to light  Neck:      Musculoskeletal: Neck supple  Cardiovascular:      Rate and Rhythm: Normal rate and regular rhythm  Pulses: Normal pulses  Heart sounds: Normal heart sounds  Pulmonary:      Effort: Pulmonary effort is normal       Breath sounds: Normal breath sounds  Abdominal:      General: Bowel sounds are normal       Palpations: Abdomen is soft  Tenderness: There is no abdominal tenderness  Musculoskeletal:      Right lower leg: Edema (chronic) present        Left lower leg: Edema (chronic) present  Skin:     General: Skin is warm and dry  Findings: Wound (left buttock pressure ulcer - patient does not allow me to examine it ) present  Neurological:      Mental Status: She is alert and oriented to person, place, and time  Psychiatric:         Mood and Affect: Mood normal       Comments: Smiling, laughing         Pertinent Laboratory/Diagnostic Studies: The following labs/studies were reviewed please see chart or hospital paperwork for details    · PT/INR   · CBC    - Counseling Time: counseling time more than 50% of visit: 30 minutes

## 2021-02-03 NOTE — ASSESSMENT & PLAN NOTE
Well controlled with sertraline 100mg qd  Continue current medication regimen  Frequently assess status

## 2021-02-03 NOTE — ASSESSMENT & PLAN NOTE
Discussed screening: colonoscopy and mammogram  Pt refuses right now wants to focus on healing pressure ulcer

## 2021-02-03 NOTE — ASSESSMENT & PLAN NOTE
 Seen by wound care on 1/12  Prior to this was seen by Plastic surgery as well who recommended continued local wound care with f/u in 6 weeks  Wound care transferred care to plastic surgery and signed off (per last note)     Plan:  o Continue local wound care   o F/u with plastic surgery in 6 weeks  o Monitor for signs of infection

## 2021-03-03 ENCOUNTER — NURSING HOME VISIT (OUTPATIENT)
Dept: GERIATRICS | Facility: OTHER | Age: 71
End: 2021-03-03
Payer: MEDICARE

## 2021-03-03 DIAGNOSIS — F32.89 OTHER DEPRESSION: ICD-10-CM

## 2021-03-03 DIAGNOSIS — I82.409 DEEP VEIN THROMBOSIS (DVT) OF LOWER EXTREMITY, UNSPECIFIED CHRONICITY, UNSPECIFIED LATERALITY, UNSPECIFIED VEIN (HCC): ICD-10-CM

## 2021-03-03 DIAGNOSIS — L89.324 PRESSURE INJURY OF LEFT BUTTOCK, STAGE 4 (HCC): Primary | ICD-10-CM

## 2021-03-03 DIAGNOSIS — I10 ESSENTIAL HYPERTENSION: ICD-10-CM

## 2021-03-03 PROCEDURE — 99309 SBSQ NF CARE MODERATE MDM 30: CPT | Performed by: FAMILY MEDICINE

## 2021-03-04 NOTE — PROGRESS NOTES
[address]   [address] [facility] Am[place]  Progress Note    NAME: Giovany Joel  AGE: 72 y o  SEX: female [id number]    DATE OF ENCOUNTER: 3/3/2021    Code status:  CPR    Assessment and Plan     Problem List Items Addressed This Visit        Cardiovascular and Mediastinum    DVT (deep venous thrombosis) (HCC)     INR at target  Continue Coumadin 6 mg  Check INR in 1 week  HTN (hypertension)     Continue current regimen  Musculoskeletal and Integument    Pressure injury of 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 (HCC) - Primary     No significant improvement in wound from prior visit  Patient remains afebrile, no obvious signs of infection  Continue local wound care  Plastic surgery following, will follow recommendations  Other    Depression     Continue lexapro  Psych following, appreciate input  Monitor status  All medications and routine orders were reviewed and updated as needed  Plan discussed with: Patient    Chief Complaint     Seen for follow-up at [address] [place] [address] Ave    History of Present Illness     Patient is less interactive today  When asked how she is feeling she states I am feeling fine" though she seems frustrated  Patient repeatedly states I am fine and refuses to answer any other questions    When asked if she has feelings of sadness or thoughts of harming self or others patient last and says I said I am fine "    HISTORY:  Past Medical History:   Diagnosis Date    Back pain     Congestive heart failure ([address] )     Depressive disorder     Hypertension     Neurogenic bladder     Open wound of leg     Osteoarthritis     Osteomyelitis ([address] )     Paraplegia ([address] )     Seizure ([address] )     Thrombosis     [facility]     Urinary retention      Family History   Problem Relation Age of Onset    Diabetes Father     Kidney cancer Father     Alzheimer's disease Mother     Bone cancer Paternal Uncle     Lung cancer Paternal Uncle      Social History     Socioeconomic History    Marital status: /Civil Union     Spouse name: None    Number of children: None    Years of education: None    Highest education level: None   Occupational History    None   Social Needs    Financial resource strain: None    Food insecurity     Worry: None     Inability: None    Transportation needs     Medical: None     Non-medical: None   Tobacco Use    Smoking status: Never Smoker    Smokeless tobacco: Never Used   Substance and Sexual Activity    Alcohol use: No    Drug use: No    Sexual activity: None   Lifestyle    Physical activity     Days per week: None     Minutes per session: None    Stress: None   Relationships    Social connections     Talks on phone: None     Gets together: None     Attends Baptism service: None     Active member of club or organization: None     Attends meetings of clubs or organizations: None     Relationship status: None    Intimate partner violence     Fear of current or ex partner: None     Emotionally abused: None     Physically abused: None     Forced sexual activity: None   Other Topics Concern    None   Social History Narrative    None       Allergies: Allergies   Allergen Reactions    Latex     Other Other (See Comments)     Irritation  Latex gloves OK per pt  Review of Systems     Review of Systems   Constitutional: Negative for appetite change, chills, fatigue and fever  Respiratory: Negative for cough and shortness of breath  Cardiovascular: Negative for chest pain  Gastrointestinal: Negative for abdominal pain  Skin: Positive for wound  Neurological: Negative for headaches  Psychiatric/Behavioral: Negative for dysphoric mood, sleep disturbance and suicidal ideas  The patient is not nervous/anxious  Medications and orders     All medications reviewed and updated        Objective     Vitals: 147/62, 72, 14, 96%, 98 3F    Physical Exam  Vitals signs reviewed  Constitutional:       General: She is not in acute distress  Appearance: She is not toxic-appearing or diaphoretic  HENT:      Mouth/Throat:      Mouth: Mucous membranes are moist    Eyes:      Extraocular Movements: Extraocular movements intact  Pupils: Pupils are equal, round, and reactive to light  Cardiovascular:      Rate and Rhythm: Normal rate and regular rhythm  Pulses: Normal pulses  Heart sounds: Normal heart sounds  Pulmonary:      Effort: Pulmonary effort is normal       Breath sounds: Normal breath sounds  Abdominal:      General: Bowel sounds are normal  There is no distension  Palpations: Abdomen is soft  Tenderness: There is no abdominal tenderness  There is no guarding  Musculoskeletal:      Right lower leg: Edema present  Left lower leg: Edema present  Comments: Trace B/L LE edema   Skin:     General: Skin is warm  Comments: Pt refuses to let me examine decubitus ulcer on buttock/lower back  Neurological:      Mental Status: She is alert  Mental status is at baseline  Psychiatric:      Comments: Flat affect  Pertinent Laboratory/Diagnostic Studies: The following labs/studies were reviewed please see chart or hospital paperwork for details    INR    - Counseling Documentation: patient was counseled regarding: impressions

## 2021-03-04 NOTE — ASSESSMENT & PLAN NOTE
No significant improvement in wound from prior visit  Patient remains afebrile, no obvious signs of infection  Continue local wound care  Plastic surgery following, will follow recommendations

## 2021-03-08 NOTE — PROGRESS NOTES
I have reviewed the notes, assessments, and/or procedures performed, I concur with her/his documentation of Angus Best 
Procedures      Pt presents for routine SP tube change  SP site clean, dry, intact, free of drainage or debris  Urine in bag is clear, mariel  Pt tolerated procedure well, no complications  Pt brings her own supplies 
Detail Level: Detailed
Quality 226: Preventive Care And Screening: Tobacco Use: Screening And Cessation Intervention: Patient screened for tobacco use and is an ex/non-smoker
Quality 431: Preventive Care And Screening: Unhealthy Alcohol Use - Screening: Patient screened for unhealthy alcohol use using a single question and scores less than 2 times per year

## 2021-03-10 ENCOUNTER — NURSING HOME VISIT (OUTPATIENT)
Dept: GERIATRICS | Facility: OTHER | Age: 71
End: 2021-03-10
Payer: MEDICARE

## 2021-03-10 VITALS
SYSTOLIC BLOOD PRESSURE: 122 MMHG | DIASTOLIC BLOOD PRESSURE: 65 MMHG | TEMPERATURE: 97.7 F | RESPIRATION RATE: 14 BRPM | OXYGEN SATURATION: 98 % | HEART RATE: 66 BPM

## 2021-03-10 DIAGNOSIS — L89.324 PRESSURE INJURY OF LEFT BUTTOCK, STAGE 4 (HCC): Primary | ICD-10-CM

## 2021-03-10 PROCEDURE — 99307 SBSQ NF CARE SF MDM 10: CPT | Performed by: NURSE PRACTITIONER

## 2021-03-10 RX ORDER — WARFARIN SODIUM 6 MG/1
6 TABLET ORAL
COMMUNITY

## 2021-03-10 RX ORDER — FUROSEMIDE 20 MG/1
20 TABLET ORAL DAILY
COMMUNITY

## 2021-03-10 NOTE — ASSESSMENT & PLAN NOTE
· Following with plastic surgery  · MRI ordered by plastic surgery, results in HPI  · Infectious dissease consult STAT, appointment made for tomorrow  · Afebrile  · CBC, CMP, CRP, ESR in moring  · Follow up with plastic surgery as advised  · Continue to monitor closely for signs of infection

## 2021-03-10 NOTE — PROGRESS NOTES
[facility] [place]  [address]  [phone]  [facility] SNF List: [facility]   Code 32      NAME: Hipolito Swan  AGE: 72 y o  SEX: female    DATE OF ENCOUNTER: 3/10/2021    Assessment and Plan     Problem List Items Addressed This Visit        Musculoskeletal and Integument    Pressure injury of 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 (HCC) - Primary     · Following with plastic surgery  · MRI ordered by plastic surgery, results in HPI  · Infectious dissease consult STAT, appointment made for tomorrow  · Afebrile  · CBC, CMP, CRP, ESR in moring  · Follow up with plastic surgery as advised  · Continue to monitor closely for signs of infection               No orders of the defined types were placed in this encounter       - Counseling Documentation: patient was counseled regarding: impressions and importance of compliance with treatment    Chief Complaint     No chief complaint on file  History of Present Illness     Zane Murdock is a 72year old female being seen today as a follow after her MRI results  Pt has chronic L buttock wound and is followed by plastic surgery outpatient  Plastic surgery ordeedr MRI, results as follows: 1  Enhancing/infectious tract extending form the L posterior ulceration  There is osseous deficiency and cortical irregularity and left posterior acetabulum which may be postsurgical or represents evolving osteomyelitis  STAT ID consult placed, appointment for tomorrow morning  Sclerosis in the diminutive left posterior ischium may be postsurgical or represent chonic osteomyelitis  2  Small collection of overlying the greater trochanter of the femur is concerning for infection  Explained to the patient MRI findings and that she would be going for an ID appointment tomorrow morning  Cormorbidites include hypertension, depression, and parkinsons disease  Upon examination pt is laying in bed watching tv, she offers no complaints    She denies fatigue, chillsm headaches, dizziness, SOB, chest pain, abdominal pain, gi/gu upset, and increasing pain  Per nursing no acute issues or concerns  The following portions of the patient's history were reviewed and updated as appropriate: allergies, current medications, past family history, past medical history, past social history, past surgical history and problem list     Review of Systems     Review of Systems   Constitutional: Negative for activity change, appetite change, chills, diaphoresis and fever  HENT: Negative for sneezing and sore throat  Respiratory: Negative for cough and shortness of breath  Cardiovascular: Negative for chest pain and palpitations  Gastrointestinal: Negative for abdominal pain, constipation, diarrhea and vomiting  Genitourinary: Negative for dysuria and hematuria  Musculoskeletal: Positive for gait problem  Negative for arthralgias and back pain  Skin: Negative for color change and rash  All other systems reviewed and are negative        Active Problem List     Patient Active Problem List   Diagnosis    Neurogenic bladder    [facility]elitis ([facility])    Depression    DVT (deep venous thrombosis) ([facility])    Heart murmur    HTN (hypertension)    Hyperlipidemia    Hyponatremia    Pressure ulcer of contiguous region involving left buttock and hip, unstageable ([facility]    Kidney lesion, native, right    Neurogenic bowel    Paralysis ([address]    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[address]    Parkinson's disease ([address]    Postoperative anemia due to acute blood loss    Seizure disorder ([address]    Protein-calorie malnutrition ([address]    Ambulatory dysfunction    Chronic right shoulder pain    Cellulitis    CHF (congestive heart failure) ([facility])    Supratherapeutic INR    Acute cystitis with hematuria    Fever    Sepsis ([address]    Septic arthritis of hip ([address] )    Atrial fibrillation with RVR ([facility])    Chronic anticoagulation    Pressure injury of 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 (HCC)    Loose stools    Chronic anemia    Healthcare maintenance       Objective     /65   Pulse 66   Temp 97 7 °F (36 5 °C)   Resp 14   SpO2 98%     Physical Exam  Vitals signs and nursing note reviewed  Constitutional:       General: She is not in acute distress  Appearance: Normal appearance  She is obese  She is not ill-appearing  HENT:      Head: Normocephalic  Cardiovascular:      Rate and Rhythm: Normal rate and regular rhythm  Pulses: Normal pulses  Heart sounds: Normal heart sounds  No murmur  Pulmonary:      Effort: Pulmonary effort is normal  No respiratory distress  Breath sounds: Normal breath sounds  No wheezing  Abdominal:      General: Bowel sounds are normal  There is no distension  Palpations: Abdomen is soft  Tenderness: There is no abdominal tenderness  Musculoskeletal:      Right lower leg: Edema present  Left lower leg: Edema present  Comments: Mild edema     Skin:     General: Skin is warm and dry  Coloration: Skin is not jaundiced  Findings: No bruising  Neurological:      General: No focal deficit present  Mental Status: She is alert  Mental status is at baseline  Motor: Weakness (paraplegic ) present  Gait: Gait abnormal (bed bound)  Psychiatric:         Mood and Affect: Mood normal          Behavior: Behavior normal          Pertinent Laboratory/Diagnostic Studies:  Labs reviewed in facility paper chart  Current Medications   Medications were reviewed and updated  Please refer to paper chart for updated list of medications      Vikram MOTA  3/10/2021 1:28 PM

## 2021-03-18 ENCOUNTER — NURSING HOME VISIT (OUTPATIENT)
Dept: GERIATRICS | Facility: OTHER | Age: 71
End: 2021-03-18
Payer: MEDICARE

## 2021-03-18 VITALS
TEMPERATURE: 97.5 F | RESPIRATION RATE: 18 BRPM | HEART RATE: 80 BPM | OXYGEN SATURATION: 94 % | SYSTOLIC BLOOD PRESSURE: 109 MMHG | DIASTOLIC BLOOD PRESSURE: 66 MMHG

## 2021-03-18 DIAGNOSIS — L89.45: Primary | ICD-10-CM

## 2021-03-18 PROCEDURE — 99307 SBSQ NF CARE SF MDM 10: CPT | Performed by: NURSE PRACTITIONER

## 2021-03-19 ENCOUNTER — NURSING HOME VISIT (OUTPATIENT)
Dept: GERIATRICS | Facility: OTHER | Age: 71
End: 2021-03-19
Payer: MEDICARE

## 2021-03-19 VITALS
HEART RATE: 73 BPM | RESPIRATION RATE: 18 BRPM | SYSTOLIC BLOOD PRESSURE: 128 MMHG | TEMPERATURE: 98.2 F | OXYGEN SATURATION: 98 % | DIASTOLIC BLOOD PRESSURE: 65 MMHG

## 2021-03-19 DIAGNOSIS — L89.45: Primary | ICD-10-CM

## 2021-03-19 PROCEDURE — 99307 SBSQ NF CARE SF MDM 10: CPT | Performed by: NURSE PRACTITIONER

## 2021-03-19 RX ORDER — SERTRALINE HYDROCHLORIDE 25 MG/1
100 TABLET, FILM COATED ORAL DAILY
COMMUNITY

## 2021-03-19 NOTE — PROGRESS NOTES
[facility]  [address]  [phone]  Senior Care SNF List: [facility]   Code 32      NAME: Kassandra Turner  AGE: 72 y o  SEX: female    DATE OF ENCOUNTER: 3/19/2021    Assessment and Plan     Problem List Items Addressed This Visit        Musculoskeletal and Integument    Pressure ulcer of contiguous region involving left buttock and hip, unstageable ([address] ) - Primary     · Drainage from old L hip wound that started yesterday  · Follows with ID, saw on Wednesday   · Follows with plastic surgery for chronic wounds  · Had previous orthopedic surgery back in July, orthopedics called and aware  · Vitals have been stable, afebrile  · Sent out to [facility] for further evaluation per plastic surgery recommendations  · Dr Trip Jimenez aware and agreeable to plan of care               No orders of the defined types were placed in this encounter       - Counseling Documentation: patient was counseled regarding: instructions for management, risk factor reductions, risks and benefits of treatment options and importance of compliance with treatment    Chief Complaint     No chief complaint on file  History of Present Illness     Ariana Mendoza is a 60-year-old female at [facility] for long-term care  She is being seen today for follow-up on her left hip and new drainage that started yesterday afternoon  Yesterday ID and plastic surgery were called and updated on new drainage from left hip  Per Infectious Disease we are to contact Plastic surgery for further recommendations  Phone call was not returned by Plastic surgery last night  Nursing staff had reached back our to Plastic surgery again this morning and was recommended by Dr Corby Hamilton that we send patient out to [facility] to be evaluated and to call her orthopedic surgeon Dr Marley Young  Message left with nurse at Dr Merlinda Harpin office about patient being sent out to the hospital and update on drainage from L hip  Comorbidites include seizures, Parkinsons, hypertension, depression, and hyperlipidemia  Upon examination patient was lying in bed  She offered no complaints this morning     She denies headaches, dizziness, shortness of breath, chest pain, and abdominal pain  She has remained afebrile  All other vital signs have been stable  On her morning labs her CRP was 221, elevated from 148 from last week  Her sed rate was 69  Her WBC were 5 6  Discussed with patient in depth the plan of care and she is agreeable to be sent out to the hospital for further evaluation  The following portions of the patient's history were reviewed and updated as appropriate: allergies, current medications, past family history, past medical history, past social history, past surgical history and problem list     Review of Systems     Review of Systems   Constitutional: Negative for appetite change, chills, fatigue and fever  HENT: Negative for ear pain and sore throat  Eyes: Negative for visual disturbance  Respiratory: Negative for cough and shortness of breath  Cardiovascular: Negative for chest pain and palpitations  Gastrointestinal: Negative for abdominal pain, nausea and vomiting  Genitourinary: Negative for dysuria  Musculoskeletal: Positive for gait problem (paraplegic )  Negative for arthralgias and back pain  Skin: Positive for wound (on buttock and L hip)  Neurological: Positive for weakness (secondry tp chornic medical conditions )  Negative for dizziness, light-headedness and headaches         Active Problem List     Patient Active Problem List   Diagnosis    Neurogenic bladder    Chronic osteomyelitis (HCC)    Depression    DVT (deep venous thrombosis) (HCC)    Heart murmur    HTN (hypertension)    Hyperlipidemia    Hyponatremia    Pressure ulcer of contiguous region involving left buttock and hip, unstageable ([address] )    Kidney lesion, native, right    Neurogenic bowel    Paralysis ([address]    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 )    Parkinson's disease ([facility]    Postoperative anemia due to acute blood loss    [facility]    Protein-calorie malnutrition ([facility] )    Ambulatory dysfunction    Chronic right shoulder pain    Cellulitis    CHF (congestive heart failure) ([facility])    Supratherapeutic INR    Acute cystitis with hematuria    Fever    Sepsis ([facility])    Septic arthritis of hip ([facility])    Atrial fibrillation with RVR ([facility])    Chronic anticoagulation    Pressure injury of 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 ([facility])    Loose stools    Chronic anemia    Healthcare maintenance       Objective     /65   Pulse 73   Temp 98 2 °F (36 8 °C)   Resp 18   SpO2 98%     Physical Exam  Vitals signs and nursing note reviewed  Constitutional:       General: She is not in acute distress  Appearance: She is obese  She is not ill-appearing  Comments: Appears chronically ill   HENT:      Head: Normocephalic  Cardiovascular:      Rate and Rhythm: Normal rate and regular rhythm  Pulses: Normal pulses  Heart sounds: Normal heart sounds  No murmur  Pulmonary:      Effort: Pulmonary effort is normal  No respiratory distress  Breath sounds: Normal breath sounds  No wheezing  Abdominal:      General: Bowel sounds are normal  There is no distension  Palpations: Abdomen is soft  Tenderness: There is no abdominal tenderness  Genitourinary:     Comments: Suprapubic cathter in place   Musculoskeletal:         General: Swelling (L hip) present  Right lower leg: Edema present  Left lower leg: Edema present  Skin:     General: Skin is warm and dry  Coloration: Skin is not jaundiced  Findings: Erythema (L hip) present  No bruising  Neurological:      General: No focal deficit present  Mental Status: She is alert and oriented to person, place, and time  Mental status is at baseline  Motor: Weakness (paraplegic ) present        Gait: Gait abnormal (bed bound)  Psychiatric:         Mood and Affect: Mood normal          Behavior: Behavior normal          Pertinent Laboratory/Diagnostic Studies:  Labs reviewed in facility paper chart  Current Medications   Medications were reviewed and updated  Please refer to paper chart for updated list of medications      Rosita MOTA  3/19/2021 5:25 PM

## 2021-03-19 NOTE — ASSESSMENT & PLAN NOTE
· Drainage from old L hip wound that started yesterday  · Follows with ID, saw on Wednesday   · Follows with plastic surgery for chronic wounds  · Had previous orthopedic surgery back in July, orthopedics called and aware  · Vitals have been stable, afebrile  · Sent out to [facility] for further evaluation per plastic surgery recommendations  · Dr Gil White aware and agreeable to plan of care

## 2021-03-19 NOTE — PROGRESS NOTES
[facility] [facility] [place]  [address]  [phone]  [facility] Care SNF List: [facility]   Code 32      NAME: Tram Vitale  AGE: 72 y o  SEX: female    DATE OF ENCOUNTER: 3/18/2021    Assessment and Plan     Problem List Items Addressed This Visit        Musculoskeletal and Integument    Pressure ulcer of contiguous region involving left buttock and hip, unstageable ([address] ) - Primary     · New Drainge from old L hip wound, site dressed by nursing  · Increased swelling and redness around site  · Patient is Unable to tell me if increasing pain secondary to paraplegia  ·  afebrile   ·  Infectious Disease and Plastic surgery contacted and updated, awaiting phone call back for further recommendations  · Increased vital signs to q 4  · Will check CBC, BMP, CRP, and ESR in the morning  · Dr Traci Steele aware and agreeable to plan of care  · Will reassess patient in the morning                No orders of the defined types were placed in this encounter       - Counseling Documentation: patient was counseled regarding: impressions, risks and benefits of treatment options and importance of compliance with treatment    Chief Complaint     No chief complaint on file  History of Present Illness     Lewis Martin is a 36-year-old female at [facility] for long-term care  Being seen today per nursing request for drainage from left hip area  Per nursing The Area has increased swelling and redness  Patient follows with Infectious Disease and Plastic surgery for chronic wounds  MRI previously ordered by plastic surgery, results as follows: 1  Enhancing/infectious tract extending form the L posterior ulceration  There is osseous deficiency and cortical irregularity and left posterior acetabulum which may be postsurgical or represents evolving osteomyelitis  2  Small collection of overlying the greater trochanter of the femur is concerning for infection  Saw infectious disease yesterday  Per Dr Tanya Willingham with ID" There is no evidence of acute infection at the present time, and antibiotics are not acutely indicated  I will discuss the patient's case in depth with Dr Hannah Reyes and try to come up with a coordinated plan  For now, local wound care and pressure reduction as prescribed by Dr Hannah Reyes"  Infectious disease and Plastic surgery office contacted today and awaiting further recommendations  Comorbidites include depression, hypertension, paraplegia, and parkinson's disease  Upon examination patient was lying in bed  L hip area is more swollen then previously with more redness, moderate amount of drainage from wound, noted when turning to clean pt  She denies fever, chills, headache, shortness of breath, chest pain, and GI/ upset  Will check lab work and q4 vitals  The following portions of the patient's history were reviewed and updated as appropriate: allergies, current medications, past family history, past medical history, past social history, past surgical history and problem list     Review of Systems     Review of Systems   Constitutional: Negative for appetite change, chills, fatigue and fever  HENT: Negative for ear pain and sore throat  Eyes: Negative for pain and visual disturbance  Respiratory: Negative for cough and shortness of breath  Cardiovascular: Negative for chest pain and palpitations  Gastrointestinal: Negative for abdominal pain and vomiting  Genitourinary: Negative for dysuria  Musculoskeletal: Positive for gait problem  Negative for arthralgias and back pain  Skin: Positive for wound  Neurological: Positive for weakness (paraplegia )  Negative for dizziness, light-headedness and headaches         Active Problem List     Patient Active Problem List   Diagnosis    Neurogenic bladder    Chronic osteomyelitis (HCC)    Depression    DVT (deep venous thrombosis) (HCC)    Heart murmur    HTN (hypertension)    Hyperlipidemia    Hyponatremia    Pressure ulcer of contiguous region involving left buttock and hip, unstageable ([facility] )    Kidney lesion, native, right    Neurogenic bowel    Paralysis ([facility]    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[facility] )    Parkinson's disease ([facility]    Postoperative anemia due to acute blood loss    Seizure disorder ([facility]    Protein-calorie malnutrition ([facility]    Ambulatory dysfunction    Chronic right shoulder pain    Cellulitis    CHF (congestive heart failure) ([facility])    Supratherapeutic INR    Acute cystitis with hematuria    Fever    Sepsis (HCC)    Septic arthritis of hip (HCC)    Atrial fibrillation with RVR ([facility])    Chronic anticoagulation    Pressure injury of 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 ([facility])    Loose stools    Chronic anemia    Healthcare maintenance       Objective     /66   Pulse 80   Temp 97 5 °F (36 4 °C)   Resp 18   SpO2 94%     Physical Exam  Vitals signs and nursing note reviewed  Constitutional:       General: She is not in acute distress  Appearance: She is obese  She is not ill-appearing  Comments: appearing chronically ill   HENT:      Head: Normocephalic  Cardiovascular:      Rate and Rhythm: Normal rate and regular rhythm  Pulses: Normal pulses  Heart sounds: Normal heart sounds  No murmur  Pulmonary:      Effort: Pulmonary effort is normal  No respiratory distress  Breath sounds: Normal breath sounds  No wheezing  Abdominal:      General: Bowel sounds are normal  There is no distension  Palpations: Abdomen is soft  Tenderness: There is no abdominal tenderness  Genitourinary:     Comments: Suprapubic catheter in place  Musculoskeletal:         General: Swelling (swelling to L hip) present  Right lower leg: Edema present  Left lower leg: Edema present  Skin:     General: Skin is warm and dry  Coloration: Skin is not jaundiced  Findings: Erythema (swelling and erythema to L hip) present  No bruising     Neurological:      General: No focal deficit present  Mental Status: She is alert and oriented to person, place, and time  Mental status is at baseline  Motor: Weakness present  Gait: Gait abnormal (bed bound)  Psychiatric:         Mood and Affect: Mood is anxious  Affect is flat  Behavior: Behavior normal          Pertinent Laboratory/Diagnostic Studies:  Labs reviewed in facility paper chart  Current Medications   Medications were reviewed and updated  Please refer to paper chart for updated list of medications      Deborah Sever CRNP  3/18/2021 11:24 PM

## 2021-04-03 ENCOUNTER — TELEPHONE (OUTPATIENT)
Dept: OTHER | Facility: OTHER | Age: 71
End: 2021-04-03

## 2021-04-05 ENCOUNTER — NURSING HOME VISIT (OUTPATIENT)
Dept: GERIATRICS | Facility: OTHER | Age: 71
End: 2021-04-05
Payer: MEDICARE

## 2021-04-05 VITALS
TEMPERATURE: 98 F | DIASTOLIC BLOOD PRESSURE: 59 MMHG | HEART RATE: 70 BPM | SYSTOLIC BLOOD PRESSURE: 131 MMHG | OXYGEN SATURATION: 96 % | RESPIRATION RATE: 18 BRPM

## 2021-04-05 DIAGNOSIS — N31.9 NEUROGENIC BLADDER: ICD-10-CM

## 2021-04-05 DIAGNOSIS — F32.89 OTHER DEPRESSION: ICD-10-CM

## 2021-04-05 DIAGNOSIS — G20 PARKINSON'S DISEASE (HCC): ICD-10-CM

## 2021-04-05 DIAGNOSIS — R79.1 SUBTHERAPEUTIC INTERNATIONAL NORMALIZED RATIO (INR): ICD-10-CM

## 2021-04-05 DIAGNOSIS — I48.91 ATRIAL FIBRILLATION WITH RVR (HCC): ICD-10-CM

## 2021-04-05 DIAGNOSIS — I10 ESSENTIAL HYPERTENSION: ICD-10-CM

## 2021-04-05 DIAGNOSIS — M00.852 ARTHRITIS OF LEFT HIP DUE TO OTHER BACTERIA (HCC): Primary | ICD-10-CM

## 2021-04-05 DIAGNOSIS — G82.20 PARAPLEGIA FOLLOWING SPINAL CORD INJURY (HCC): ICD-10-CM

## 2021-04-05 PROCEDURE — 99306 1ST NF CARE HIGH MDM 50: CPT | Performed by: FAMILY MEDICINE

## 2021-04-05 RX ORDER — AMOXICILLIN AND CLAVULANATE POTASSIUM 875; 125 MG/1; MG/1
1 TABLET, FILM COATED ORAL EVERY 12 HOURS SCHEDULED
COMMUNITY
End: 2021-04-13

## 2021-04-05 NOTE — PROGRESS NOTES
[address], [place], [address]  History and Physical  POS: 31    Records Reviewed include: Hospital records      Chief Complaint/ Reason for Admission:   Left hip septic arthritis    History of Present Illness:      Ms Janette Smith is a 71 yo female who was recently admitted to [facility] for osteomyelitis, left hip septic arthritis and cellulitis ,underwent washout and received iv antibiotics  Currently will be readmitted to [place] for STR and then will will continue care in LTC  Will continue augmentin and follow up with ID and Ortho as outpatient  Hemipelvectomy was considered during her hospital stay however patient refused  Will monitor CBC, CMP, CRP, ESR, monitor VS closely  She denies pain currently ( paraplegic)  Drain in place left hip will continue to monitor, surgical scars healing well  She denies Cp, SOB, cough, fever, chills  Reports abdominal bloating, no constipation, diarrhea, nausea, vomiting  Appetite is at baseline  Allergies    Allergies   Allergen Reactions    Latex - Food Allergy     Other Other (See Comments)     Irritation  Latex gloves OK per pt         Past Medical History  Past Medical History:   Diagnosis Date    Back pain     Congestive heart failure (HCC)     Depressive disorder     Hypertension     Neurogenic bladder     Open wound of leg     Osteoarthritis     Osteomyelitis (HCC)     Paraplegia (HCC)     Seizure ([address] )     Thrombosis     Ulcer of ankle ([address] )     Urinary retention         Past Surgical History:   Procedure Laterality Date    CYSTOSCOPY      SKIN GRAFT         Family History  Family History   Problem Relation Age of Onset    Diabetes Father     Kidney cancer Father     Alzheimer's disease Mother     Bone cancer Paternal Uncle     Lung cancer Paternal Uncle        Social History  Social History     Tobacco Use   Smoking Status Never Smoker   Smokeless Tobacco Never Used      Social History     Substance and Sexual Activity   Alcohol Use No      Social History     Substance and Sexual Activity   Drug Use No        Lives: [facility],  Social Support: family  Fall in the past 12 months: no  Use of assistance Device: bed bound    Physical Exam    Vital Signs    Vitals:    04/05/21 2025   BP: 131/59   Pulse: 70   Resp: 18   Temp: 98 °F (36 7 °C)   SpO2: 96%                 Physical Exam  Vitals signs and nursing note reviewed  Constitutional:       General: She is not in acute distress  Appearance: She is obese  She is not diaphoretic  HENT:      Head: Normocephalic and atraumatic  Eyes:      General:         Right eye: No discharge  Left eye: No discharge  Pupils: Pupils are equal, round, and reactive to light  Neck:      Musculoskeletal: Normal range of motion and neck supple  Cardiovascular:      Rate and Rhythm: Normal rate and regular rhythm  Heart sounds: Normal heart sounds  No murmur  Pulmonary:      Effort: Pulmonary effort is normal  No respiratory distress  Breath sounds: Normal breath sounds  No wheezing  Abdominal:      Palpations: Abdomen is soft  Tenderness: There is no abdominal tenderness  There is no guarding or rebound  Genitourinary:     Comments: SP cath  Musculoskeletal:         General: No tenderness  Right lower leg: No edema  Left lower leg: No edema  Comments: Bed bound   Skin:     General: Skin is warm and dry  Coloration: Skin is pale  Findings: Bruising present  Comments: Surgical scars healing well left hip, drain in place draining serosanguineous fluid  Sacral wound    Neurological:      Mental Status: She is alert and oriented to person, place, and time  Mental status is at baseline  Psychiatric:         Behavior: Behavior normal          Review of Systems:  Review of Systems   Constitutional: Negative for appetite change, chills and fever  HENT: Negative for congestion      Respiratory: Negative for cough, shortness of breath and wheezing  Cardiovascular: Negative for chest pain, palpitations and leg swelling  Gastrointestinal: Positive for abdominal distention  Negative for abdominal pain and constipation  Endocrine: Negative for cold intolerance  Genitourinary: Negative for difficulty urinating, dysuria and hematuria  Musculoskeletal: Positive for gait problem  Skin: Positive for wound  Allergic/Immunologic: Negative for environmental allergies  Neurological: Negative for dizziness, seizures and headaches  Hematological: Does not bruise/bleed easily  Psychiatric/Behavioral: Negative for behavioral problems and sleep disturbance  List of Current Medications:    Medication reviewed  All orders signed  Complete list is in the paper chart  Allergies    Allergies   Allergen Reactions    Latex - Food Allergy     Other Other (See Comments)     Irritation  Latex gloves OK per pt  Labs/Diagnostics (reviewed by this provider): I personally reviewed lab results and imaging studies  Full reports are in the paper chart  Assessment/Plan:    Septic arthritis of hip (HCC)  Septic arthritis of left hip s/p washout  Will continue augmentin  Follow up with ID on 4/9 and follow up with Ortho  Continue to monitor Cbc, CMP, CRP, ESR  Drain in place will continue to monitor output  Monitor VS   Denies pain currently( paraplegic), will continue to monitor      Paraplegia following spinal cord injury [facility])  Continue supportive care  Reposition frequently    Atrial fibrillation with RVR (HCC)  Currently rate controlled, asymptomatic  Continue same regimen and monitor  Continue anticoagulation with coumadin  Subtherapeutic international normalized ratio (INR)  INR 1 8 today, will increase coumadin to 7 mg daily, repeat PT/INR in 3 days    Depression  Currently stable, continue supportive care and current regimen    Denies SI/HI    Neurogenic bladder  SP in place, continue local care  Discontinue oxybutynin and monitor    Parkinson's disease ([address] )  Currently stable, continue sinemet and monitor  Follow up with Neurology as needed    HTN (hypertension)  Currently well controled, will continue same regimen with holding parameters    Goal for BP<140/90       Pain: no  Rehab Potential:Poor  Patient Informed of Medical Condition: yes   Patient is Capable of Understanding Their Right: yes   Discharge Plan: LTC  Vaccination:   Immunization History   Administered Date(s) Administered    INFLUENZA 02/12/2015     Advanced Directives: yes: Yes   Code status:Full Code      Nevaeh Giordano MD  Geriatric Medicine  0/2/22259:85 PM yes

## 2021-04-06 NOTE — ASSESSMENT & PLAN NOTE
Currently rate controlled, asymptomatic  Continue same regimen and monitor  Continue anticoagulation with coumadin

## 2021-04-06 NOTE — ASSESSMENT & PLAN NOTE
Septic arthritis of left hip s/p washout  Will continue augmentin  Follow up with ID on 4/9 and follow up with Ortho  Continue to monitor Cbc, CMP, CRP, ESR  Drain in place will continue to monitor output    Monitor VS   Denies pain currently( paraplegic), will continue to monitor

## 2021-04-07 ENCOUNTER — NURSING HOME VISIT (OUTPATIENT)
Dept: GERIATRICS | Facility: OTHER | Age: 71
End: 2021-04-07
Payer: MEDICARE

## 2021-04-07 DIAGNOSIS — I10 ESSENTIAL HYPERTENSION: ICD-10-CM

## 2021-04-07 DIAGNOSIS — G83.9 PARALYSIS (HCC): ICD-10-CM

## 2021-04-07 DIAGNOSIS — I48.91 ATRIAL FIBRILLATION WITH RVR (HCC): ICD-10-CM

## 2021-04-07 DIAGNOSIS — M00.852 ARTHRITIS OF LEFT HIP DUE TO OTHER BACTERIA (HCC): ICD-10-CM

## 2021-04-07 DIAGNOSIS — I82.409 DEEP VEIN THROMBOSIS (DVT) OF LOWER EXTREMITY, UNSPECIFIED CHRONICITY, UNSPECIFIED LATERALITY, UNSPECIFIED VEIN (HCC): Primary | ICD-10-CM

## 2021-04-07 PROBLEM — R26.2 AMBULATORY DYSFUNCTION: Status: RESOLVED | Noted: 2020-01-14 | Resolved: 2021-04-07

## 2021-04-07 PROBLEM — N30.01 ACUTE CYSTITIS WITH HEMATURIA: Status: RESOLVED | Noted: 2020-06-01 | Resolved: 2021-04-07

## 2021-04-07 PROBLEM — R50.9 FEVER: Status: RESOLVED | Noted: 2020-07-10 | Resolved: 2021-04-07

## 2021-04-07 PROBLEM — Z00.00 HEALTHCARE MAINTENANCE: Status: RESOLVED | Noted: 2021-02-03 | Resolved: 2021-04-07

## 2021-04-07 PROBLEM — R79.1 SUBTHERAPEUTIC INTERNATIONAL NORMALIZED RATIO (INR): Status: RESOLVED | Noted: 2021-04-05 | Resolved: 2021-04-07

## 2021-04-07 PROBLEM — R79.1 SUPRATHERAPEUTIC INR: Status: RESOLVED | Noted: 2020-06-01 | Resolved: 2021-04-07

## 2021-04-07 PROCEDURE — 99309 SBSQ NF CARE MODERATE MDM 30: CPT | Performed by: FAMILY MEDICINE
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
## 2021-04-09 ENCOUNTER — NURSING HOME VISIT (OUTPATIENT)
Dept: GERIATRICS | Facility: OTHER | Age: 71
End: 2021-04-09
Payer: MEDICARE

## 2021-04-09 DIAGNOSIS — M00.852 ARTHRITIS OF LEFT HIP DUE TO OTHER BACTERIA (HCC): Primary | ICD-10-CM

## 2021-04-09 DIAGNOSIS — K30 INDIGESTION: ICD-10-CM

## 2021-04-09 PROCEDURE — 99309 SBSQ NF CARE MODERATE MDM 30: CPT | Performed by: FAMILY MEDICINE

## 2021-04-09 NOTE — PROGRESS NOTES
[address] [facility]  [address]  [phone] [address] of Service: nursing home place of service: [employer] Unskilled- No Part A Coverage      NAME: Connie Sanz  AGE: 72 y o  SEX: female [phone]    DATE OF ENCOUNTER: 4/9/21    Assessment and Plan     Problem List Items Addressed This Visit        Musculoskeletal and Integument    Septic arthritis of hip ([address] ) - Primary     Currently stable, follow up wit ID today  Currently on Augmentin possible cause of her indigestion  Will continue to monitor Cbc, CMP, CRP, ESR  Monitor VS closely  Continue local wound care            Other    Indigestion     Possible side effect from antibiotic- augmentin  Will start famotidine BID  Continue tums and simethicone PRN  Continue to monitor                   Chief Complaint     Indigestion      History of Present Illness     Connie Sanz is a 72 y o  female who was seen today for follow up  Patient seen and examined at bedside, reports indigestion, tums minimally effective  No fever, chills, diarrhea  At the time of encounter she stated she feels a little better and she was trying to eat  Reports burning epigastric pain after meals,no abdominal pain currently          The following portions of the patient's history were reviewed and updated as appropriate: allergies, current medications, past family history, past medical history, past social history, past surgical history and problem list     Review of Systems     Review of Systems   Constitutional: Negative for chills and fever  Respiratory: Negative for cough and shortness of breath  Cardiovascular: Negative for chest pain and palpitations  Gastrointestinal: Positive for abdominal distention, abdominal pain and nausea  Negative for constipation, diarrhea and vomiting  Musculoskeletal: Positive for gait problem  Skin: Positive for wound         Active Problem List     Patient Active Problem List   Diagnosis    Neurogenic bladder    Chronic osteomyelitis ([facility])    Depression    DVT (deep venous thrombosis) ([facility])    Heart murmur    HTN (hypertension)    Hyperlipidemia    Hyponatremia    Pressure ulcer of contiguous region involving left buttock and hip, unstageable ([facility])    Kidney lesion, native, right    Neurogenic bowel    Paralysis ([facility]    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[facility] )    Parkinson's disease ([facility] )    Postoperative anemia due to acute blood loss    Seizure disorder ([facility])    Protein-calorie malnutrition ([facility])    Chronic right shoulder pain    Cellulitis    CHF (congestive heart failure) ([facility])    Sepsis ([facility] )    Septic arthritis of hip ([facility])    Atrial fibrillation with RVR ([facility])    Chronic anticoagulation    Pressure injury of 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[facility])    Loose stools    Chronic anemia    Indigestion       Objective     Vital Signs:     Blood pressure 115/75 Heart Rate: 76 Respiratory Rate 16   Temperature 98 6 Oxygen Saturation 96%     Physical Exam  Cardiovascular:      Rate and Rhythm: Normal rate and regular rhythm  Heart sounds: No murmur  No gallop  Pulmonary:      Effort: Pulmonary effort is normal  No respiratory distress  Breath sounds: Normal breath sounds  No wheezing  Abdominal:      General: Bowel sounds are normal  There is distension (mild)  Palpations: Abdomen is soft  Tenderness: There is no abdominal tenderness  There is no guarding  Musculoskeletal:         General: No tenderness  Right lower leg: No edema  Left lower leg: No edema  Comments: bedbound   Skin:     Comments: Sacral wound, surgical wounds left hip drain in place   Neurological:      Mental Status: She is oriented to person, place, and time  Mental status is at baseline  Pertinent Laboratory/Diagnostic Studies:  Laboratory and Imaging studies reviewed  Full report in the paper chart       Current Medications   Medications reviewed and updated in facility chart      Name: Kike Huitron  : 1950  MRN: [id number]        Serjio Jorge MD  Geriatric Medicine  21   5:00 PM

## 2021-04-11 PROBLEM — K30 INDIGESTION: Status: ACTIVE | Noted: 2021-04-11

## 2021-04-11 RX ORDER — FAMOTIDINE 20 MG/1
20 TABLET, FILM COATED ORAL 2 TIMES DAILY
COMMUNITY

## 2021-04-11 NOTE — ASSESSMENT & PLAN NOTE
Possible side effect from antibiotic- augmentin  Will start famotidine BID  Continue tums and simethicone PRN    Continue to monitor

## 2021-04-11 NOTE — ASSESSMENT & PLAN NOTE
Currently stable, follow up wit ID today  Currently on Augmentin possible cause of her indigestion  Will continue to monitor Cbc, CMP, CRP, ESR  Monitor VS closely    Continue local wound care

## 2021-04-13 ENCOUNTER — NURSING HOME VISIT (OUTPATIENT)
Dept: GERIATRICS | Facility: OTHER | Age: 71
End: 2021-04-13
Payer: MEDICARE

## 2021-04-13 VITALS
SYSTOLIC BLOOD PRESSURE: 128 MMHG | RESPIRATION RATE: 18 BRPM | TEMPERATURE: 97.8 F | DIASTOLIC BLOOD PRESSURE: 71 MMHG | OXYGEN SATURATION: 96 % | HEART RATE: 62 BPM

## 2021-04-13 DIAGNOSIS — I48.91 ATRIAL FIBRILLATION WITH RVR (HCC): ICD-10-CM

## 2021-04-13 DIAGNOSIS — M00.852 ARTHRITIS OF LEFT HIP DUE TO OTHER BACTERIA (HCC): Primary | ICD-10-CM

## 2021-04-13 DIAGNOSIS — G20 PARKINSON'S DISEASE (HCC): ICD-10-CM

## 2021-04-13 DIAGNOSIS — G83.9 PARALYSIS (HCC): ICD-10-CM

## 2021-04-13 DIAGNOSIS — N31.9 NEUROGENIC BLADDER: ICD-10-CM

## 2021-04-13 PROCEDURE — 99309 SBSQ NF CARE MODERATE MDM 30: CPT | Performed by: NURSE PRACTITIONER

## 2021-04-13 NOTE — ASSESSMENT & PLAN NOTE
Anticoagulation coverage check.  Patient has [employer] through an employer with $2876 (of $3000) deductible remaining.     Xarelto:  $493/mo. Upon receipt of RX [facility] can provide copay savings card to reduce this to $10/mo for the first two fills, and then $293/mo thereafter.      Eliquis:  $500/mo. Upon receipt of RX [facility] can provide copay savings card to reduce this to $10 per month.       Enoxaparin 120mg x 10 syringes: $107.     Jantoven (warfarin): $4/mo.        --ANYA Acevedo, Pharmacy Technician/Liaison, [facility] [phone]     Currently well controled, will continue same regimen with holding parameters    Goal for BP<140/90

## 2021-04-13 NOTE — ASSESSMENT & PLAN NOTE
· Currently stable  · ID discontinued Augmentin  · Follow up with ID and orthopedics PRN  · Continue with local site care  · Continue to monitor
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
## 2021-04-13 NOTE — ASSESSMENT & PLAN NOTE
· Currently stable  · Continue on current medication regimen of carbidopa levodopa  · Follow up with neurology as recommended

## 2021-04-13 NOTE — ASSESSMENT & PLAN NOTE
· Suprapubic in place  · Exchanged in hospital per patient  · Continue with local site care  · Follow up with urology as recommended

## 2021-04-13 NOTE — ASSESSMENT & PLAN NOTE
· Asymptomatic, rate controlled  · Continue with current medication regimen of metoprolol  · Continue with Coumadin for anticoagulation, trending INR  · Continue to monitor
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
## 2021-04-15 ENCOUNTER — NURSING HOME VISIT (OUTPATIENT)
Dept: GERIATRICS | Facility: OTHER | Age: 71
End: 2021-04-15
Payer: MEDICARE

## 2021-04-15 VITALS
DIASTOLIC BLOOD PRESSURE: 54 MMHG | OXYGEN SATURATION: 96 % | SYSTOLIC BLOOD PRESSURE: 114 MMHG | RESPIRATION RATE: 18 BRPM | TEMPERATURE: 97.7 F | HEART RATE: 74 BPM

## 2021-04-15 DIAGNOSIS — I10 ESSENTIAL HYPERTENSION: ICD-10-CM

## 2021-04-15 DIAGNOSIS — F32.89 OTHER DEPRESSION: ICD-10-CM

## 2021-04-15 DIAGNOSIS — N31.9 NEUROGENIC BLADDER: ICD-10-CM

## 2021-04-15 DIAGNOSIS — M00.852 ARTHRITIS OF LEFT HIP DUE TO OTHER BACTERIA (HCC): Primary | ICD-10-CM

## 2021-04-15 DIAGNOSIS — I48.91 ATRIAL FIBRILLATION WITH RVR (HCC): ICD-10-CM

## 2021-04-15 DIAGNOSIS — K30 INDIGESTION: ICD-10-CM

## 2021-04-15 PROCEDURE — 99309 SBSQ NF CARE MODERATE MDM 30: CPT | Performed by: NURSE PRACTITIONER

## 2021-04-15 NOTE — ASSESSMENT & PLAN NOTE
· Currently stable  · Antibiotics discontinued by a Infectious Disease also be  · Follow-up orthopedic appointment today   · Continue to monitor for signs symptoms of infection  · Continue with local site care per Orthopedics orders
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
## 2021-04-15 NOTE — ASSESSMENT & PLAN NOTE
· Currently stable  · Continue with current medication regimen as sertraline and mirtazapine   · Provide emotional support  · Provide supportive care  · Consult to Psychology services and med options psychiatric services previously placed  · Continue to monitor and adjust medications as needed

## 2021-04-15 NOTE — ASSESSMENT & PLAN NOTE
· Augmentin previously DC'd by ID  · Famotidine started last week  · Per patient indigestion has since resolved  · Continue to monitor

## 2021-04-15 NOTE — ASSESSMENT & PLAN NOTE
· Stable  · Suprapubic catheter in place  · Suprapubic exchanged in hospital  · Follow-up with Urology as recommended

## 2021-04-15 NOTE — ASSESSMENT & PLAN NOTE
· Stable  · Asymptomatic and rate controlled  · Continue with current medication regimen of metoprolol and Coumadin for anticoagulation   · Continue to trend PT INRs  · Continue to monitor  · Follow-up with cardiology as needed

## 2021-04-15 NOTE — ASSESSMENT & PLAN NOTE
· Currently stable  · Continue current medication regimen of amlodipine benazepril and metoprolol  · Encouraged dietary lifestyle modification  · Continue to monitor and adjust medications as needed

## 2021-04-20 ENCOUNTER — NURSING HOME VISIT (OUTPATIENT)
Dept: GERIATRICS | Facility: OTHER | Age: 71
End: 2021-04-20
Payer: MEDICARE

## 2021-04-20 VITALS
RESPIRATION RATE: 20 BRPM | OXYGEN SATURATION: 96 % | DIASTOLIC BLOOD PRESSURE: 68 MMHG | SYSTOLIC BLOOD PRESSURE: 127 MMHG | TEMPERATURE: 97.6 F | BODY MASS INDEX: 35.3 KG/M2 | HEART RATE: 76 BPM | WEIGHT: 218.7 LBS

## 2021-04-20 DIAGNOSIS — K30 INDIGESTION: ICD-10-CM

## 2021-04-20 DIAGNOSIS — G82.20 PARAPLEGIA FOLLOWING SPINAL CORD INJURY (HCC): ICD-10-CM

## 2021-04-20 DIAGNOSIS — D63.8 ANEMIA DUE TO CHRONIC ILLNESS: ICD-10-CM

## 2021-04-20 DIAGNOSIS — F32.0 CURRENT MILD EPISODE OF MAJOR DEPRESSIVE DISORDER, UNSPECIFIED WHETHER RECURRENT (HCC): ICD-10-CM

## 2021-04-20 DIAGNOSIS — I48.91 ATRIAL FIBRILLATION WITH RVR (HCC): ICD-10-CM

## 2021-04-20 DIAGNOSIS — N31.9 NEUROGENIC BLADDER: ICD-10-CM

## 2021-04-20 DIAGNOSIS — G20 PARKINSON'S DISEASE (HCC): ICD-10-CM

## 2021-04-20 DIAGNOSIS — I10 ESSENTIAL HYPERTENSION: ICD-10-CM

## 2021-04-20 DIAGNOSIS — M00.859: Primary | ICD-10-CM

## 2021-04-20 PROBLEM — A41.9 SEPSIS (HCC): Status: RESOLVED | Noted: 2020-07-17 | Resolved: 2021-04-20

## 2021-04-20 PROBLEM — L03.90 CELLULITIS: Status: RESOLVED | Noted: 2020-01-28 | Resolved: 2021-04-20

## 2021-04-20 PROCEDURE — 99309 SBSQ NF CARE MODERATE MDM 30: CPT | Performed by: FAMILY MEDICINE
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
## 2021-04-20 NOTE — ASSESSMENT & PLAN NOTE
Goal is < 140/90, today it is 127/68  Current medications include: amlodipine-benazepril (10-20 mg) and metoprolol tartrate 50 mg BID  Is also on Furosemide 20 mg daily   Patient's blood pressure is controlled  Patient denies any side effects with medications  - Patient educated on the importance of appropriate dieting (low salt)     - Continue current medications and adjust as needed

## 2021-04-20 NOTE — ASSESSMENT & PLAN NOTE
Currently stable    - Continue on current medication regimen of carbidopa levodopa  - Follow up with neurology as recommended

## 2021-04-20 NOTE — ASSESSMENT & PLAN NOTE
Stable - long term suprapubic catheter in place  Suprapubic exchanged during recent hospitalization    - Follow-up with Urology as recommended

## 2021-04-20 NOTE — ASSESSMENT & PLAN NOTE
Currently stable, no drainage from the wound other than scant amount of serosanguinous fluid in bag  Patient is afebrile and CBC is within normal limits  CRP trend was 60 1 on 4/7 then 37 on 4/13  CRP was due to be drawn today however appears it was not collected  Will try to add on to specimen collected today   If not will repeat in one week    Antibiotics discontinued by a Infectious Disease, recent follow up with orthopedics    - Continue to monitor for signs symptoms of infection  - Continue with local site care per Orthopedics orders  - CBC, CRP, BMP and INR in one week

## 2021-04-20 NOTE — ASSESSMENT & PLAN NOTE
Stable and asymptomatic  Rate controlled with metoprolol  Anticoagulation with Coumadin, INR goal of 2-3  INR today was 2 6, at goal      -Continue to trend PT INRs weekly  -Continue current medications with coumadin at 6 mg daily  -Continue to monitor  -Follow-up with cardiology as needed

## 2021-04-20 NOTE — ASSESSMENT & PLAN NOTE
Hb today was 11 0  This is around patients baseline  One week ago it was 11 4  No signs of acute bleeding     Continue to monitor

## 2021-04-20 NOTE — ASSESSMENT & PLAN NOTE
Currently stable with medications including sertraline 100 mg daily and mirtazapine 7 5 mg qhs    - Provide emotional support & supportive care  - Continue to monitor and adjust medications as needed
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
## 2021-04-20 NOTE — ASSESSMENT & PLAN NOTE
Pepcid 20 mg BID started recently & Augmentin was discontinued   Patient's symptoms have resolved    - Continue Pepcid & continue to monitor

## 2021-04-23 ENCOUNTER — NURSING HOME VISIT (OUTPATIENT)
Dept: GERIATRICS | Facility: OTHER | Age: 71
End: 2021-04-23
Payer: MEDICARE

## 2021-04-23 VITALS
RESPIRATION RATE: 18 BRPM | DIASTOLIC BLOOD PRESSURE: 67 MMHG | TEMPERATURE: 98 F | SYSTOLIC BLOOD PRESSURE: 121 MMHG | HEART RATE: 68 BPM | OXYGEN SATURATION: 97 %

## 2021-04-23 DIAGNOSIS — M00.859: ICD-10-CM

## 2021-04-23 DIAGNOSIS — I10 ESSENTIAL HYPERTENSION: Primary | ICD-10-CM

## 2021-04-23 DIAGNOSIS — G20 PARKINSON'S DISEASE (HCC): ICD-10-CM

## 2021-04-23 DIAGNOSIS — F32.0 CURRENT MILD EPISODE OF MAJOR DEPRESSIVE DISORDER, UNSPECIFIED WHETHER RECURRENT (HCC): ICD-10-CM

## 2021-04-23 DIAGNOSIS — I48.91 ATRIAL FIBRILLATION WITH RVR (HCC): ICD-10-CM

## 2021-04-23 PROCEDURE — 99309 SBSQ NF CARE MODERATE MDM 30: CPT | Performed by: NURSE PRACTITIONER

## 2021-04-23 NOTE — ASSESSMENT & PLAN NOTE
· Currently stable  · Continue with current medication regimen of sertraline 100mg daily and mirtazpine qhs  · Continue to provide emotional support  · Psych services and med options following  · Continue to monitor

## 2021-04-23 NOTE — ASSESSMENT & PLAN NOTE
· Currently stable and at goal  · Maintain /90  · Blood pressure this morning 121/67  · Continue with current medication regimen  · Encouraged dietary and lifestyle modifications  · Cardiac low sodium diet

## 2021-04-23 NOTE — ASSESSMENT & PLAN NOTE
· Currently stable  · No drainage from wound  · Drain intact, small amount of drainage  · Shes been afebrile and vital signs have stable   · CRP was tending down  · Antibiotics previously discontinued by ID  · Follow up with orthopedics as recommended  · Continue to monitor

## 2021-04-23 NOTE — PROGRESS NOTES
[address] [facility]  [address]  [phone]  Senior Care SNF List: [facility]   Code 31      NAME: Rickey Pedro  AGE: 72 y o   SEX: female    DATE OF ENCOUNTER: 4/23/2021    Assessment and Plan     Problem List Items Addressed This Visit        Cardiovascular and Mediastinum    HTN (hypertension) - Primary     · Currently stable and at goal  · Maintain /90  · Blood pressure this morning 121/67  · Continue with current medication regimen  · Encouraged dietary and lifestyle modifications  · Cardiac low sodium diet         Atrial fibrillation with RVR (HCC)     · Currently stable   · Denies chest pain and palpitations  · Continue with current medication regimen of metoprolol  · Continue with coumadin for anticoagulation  · Last INR 2 7, repeat INR next week  · Follow up with cardiology as recommended  · Continue to monitor             Nervous and Auditory    Parkinson's disease ([address] )     · Currently stable  · Continue with current medication regimen of phenytoin and carbidopa-levodopa  · Follow up with neurology as recommended   · Continue to monitor             Musculoskeletal and Integument    Septic arthritis of hip (HCC)     · Currently stable  · No drainage from wound  · Drain intact, small amount of drainage  · Shes been afebrile and vital signs have stable   · CRP was tending down  · Antibiotics previously discontinued by ID  · Follow up with orthopedics as recommended  · Continue to monitor             Other    Depression     · Currently stable  · Continue with current medication regimen of sertraline 100mg daily and mirtazpine qhs  · Continue to provide emotional support  · Psych services and med options following  · Continue to monitor                No orders of the defined types were placed in this encounter       - Counseling Documentation: patient was counseled regarding: risks and benefits of treatment options and importance of compliance with treatment    Chief Complaint     No chief complaint on file  History of Present Illness     Noah Conroy is a 72year old female at [facility] for short term rehab, will then transition back to long term care  She is being seen today as a follow up on her chronic medical conditions  Comorbidities parkinson, hypertension, depression, and seizures  Upon examination pt was laying in bed resting comfortably, she offered no complaints  She is sleeping well at night  She has a good appetite  Indigestion and diarrhea have improved  She denies headaches, dizziness, lightheadedness, vision changes, chest pain, sob, cough, abdominal pain, and gi/gu upset  Per nursing no acute concerns or issues at this time  The following portions of the patient's history were reviewed and updated as appropriate: allergies, current medications, past family history, past medical history, past social history, past surgical history and problem list     Review of Systems     Review of Systems   Constitutional: Negative for activity change, appetite change, chills, fatigue and fever  HENT: Negative for congestion, ear pain and sore throat  Eyes: Negative for visual disturbance  Respiratory: Negative for cough, shortness of breath and wheezing  Cardiovascular: Negative for chest pain and palpitations  Gastrointestinal: Negative for abdominal distention, abdominal pain, constipation, diarrhea, nausea and vomiting  Genitourinary: Negative for dysuria and hematuria  Musculoskeletal: Positive for gait problem (paraplegic )  Negative for arthralgias and back pain  Skin: Positive for wound (on buttock)  Neurological: Positive for weakness (secondry tp chornic medical conditions )  Negative for dizziness, light-headedness and headaches  Psychiatric/Behavioral: Negative for confusion and sleep disturbance         Active Problem List     Patient Active Problem List   Diagnosis    Neurogenic bladder    Chronic osteomyelitis ([facility] [address] )    Depression    DVT [facility]deep venous thrombosis) ([facility])    Heart murmur    HTN (hypertension)    Hyperlipidemia    Hyponatremia    Pressure ulcer of contiguous region involving left buttock and hip, unstageable ([facility])    Kidney lesion, native, right    Neurogenic bowel    Paralysis ([facility] [address] )    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[facility] [address] )    Parkinson's disease ([facility] [address] )    Postoperative anemia due to acute blood loss    Seizure disorder ([facility])    Protein-calorie malnutrition ([facility])    Chronic right shoulder pain    CHF (congestive heart failure) ([facility])    Septic arthritis of hip ([facility])    Atrial fibrillation with RVR ([facility])    Chronic anticoagulation    Pressure injury of 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 ([facility])    Loose stools    Anemia due to chronic illness    Indigestion       Objective     /67   Pulse 68   Temp 98 °F (36 7 °C)   Resp 18   SpO2 97%     Physical Exam  Vitals signs and nursing note reviewed  Constitutional:       General: She is not in acute distress  Appearance: Normal appearance  She is obese  She is not ill-appearing  HENT:      Head: Normocephalic  Cardiovascular:      Rate and Rhythm: Normal rate and regular rhythm  Pulses: Normal pulses  Heart sounds: Normal heart sounds  No murmur  Pulmonary:      Effort: Pulmonary effort is normal  No respiratory distress  Breath sounds: Normal breath sounds  No wheezing  Abdominal:      General: Bowel sounds are normal  There is no distension  Palpations: Abdomen is soft  Tenderness: There is no abdominal tenderness  Genitourinary:     Comments: suprepubic in place    Musculoskeletal:      Right lower leg: Edema (midl) present  Left lower leg: Edema (mild) present  Skin:     General: Skin is warm and dry  Coloration: Skin is not jaundiced  Findings: Rash (excoriatio adbominal folds) and wound (surgical scar L hip with draine in place) present  No bruising     Neurological: General: No focal deficit present  Mental Status: She is alert  Mental status is at baseline  Motor: Weakness (paraplegic) present  Gait: Gait abnormal (bedbound)  Psychiatric:         Mood and Affect: Mood normal          Behavior: Behavior normal          Pertinent Laboratory/Diagnostic Studies:  Labs reviewed in facility paper chart  Current Medications   Medications were reviewed and updated  Please refer to paper chart for updated list of medications      Vanessa MOTA  4/23/2021 2:01 PM

## 2021-04-23 NOTE — ASSESSMENT & PLAN NOTE
· Currently stable   · Denies chest pain and palpitations  · Continue with current medication regimen of metoprolol  · Continue with coumadin for anticoagulation  · Last INR 2 7, repeat INR next week  · Follow up with cardiology as recommended  · Continue to monitor

## 2021-04-23 NOTE — ASSESSMENT & PLAN NOTE
· Currently stable  · Continue with current medication regimen of phenytoin and carbidopa-levodopa  · Follow up with neurology as recommended   · Continue to monitor

## 2021-05-05 ENCOUNTER — NURSING HOME VISIT (OUTPATIENT)
Dept: GERIATRICS | Facility: OTHER | Age: 71
End: 2021-05-05
Payer: MEDICARE

## 2021-05-05 DIAGNOSIS — I10 ESSENTIAL HYPERTENSION: Primary | ICD-10-CM

## 2021-05-05 DIAGNOSIS — M86.60 CHRONIC OSTEOMYELITIS (HCC): ICD-10-CM

## 2021-05-05 DIAGNOSIS — E86.0 DEHYDRATION: ICD-10-CM

## 2021-05-05 DIAGNOSIS — I82.409 DEEP VEIN THROMBOSIS (DVT) OF LOWER EXTREMITY, UNSPECIFIED CHRONICITY, UNSPECIFIED LATERALITY, UNSPECIFIED VEIN (HCC): ICD-10-CM

## 2021-05-05 PROCEDURE — 99309 SBSQ NF CARE MODERATE MDM 30: CPT | Performed by: FAMILY MEDICINE

## 2021-05-05 NOTE — PROGRESS NOTES
[address]   [address] [facility] Am[address]ep [address]  Progress Note    NAME: Anthony Rios  AGE: 80 y o  SEX: female [phone]    DATE OF ENCOUNTER: 5/5/2021        Assessment and Plan     HTN (hypertension)  BP stable  Continue current medication regimen    DVT (deep venous thrombosis) (HCC)  Stable on coumadin  PT/INR tomorrow since we're getting other labs    Chronic osteomyelitis ([address] )  Was recently taken off of antibiotics by ortho due to gastrointestinal sx's  Pt denies fever, chills, night sweats  Temp today 98 2F  - CRP ordered  If elevated may indicate worsening infection and thus may need further antibiotics  Dehydration  Elevated BUN:Cr ratio  - encouraged po fluids  - ordered repeat CMP  If remains elevated will start IV fluids       All medications and routine orders were reviewed and updated as needed  Plan discussed with: Patient    Chief Complaint     Seen for follow-up at [address] [place] [address], a 80 y o  female  has a past medical history of Back pain, Congestive heart failure ([address] ), Depressive disorder, Hypertension, Neurogenic bladder, Open wound of leg, Osteoarthritis, Osteomyelitis ([address] ), Paraplegia ([address] ), Seizure ([address] ), Thrombosis, Ulcer of ankle ([address] ), and Urinary retention        Today she denies any new complaints  She was frustrated and was not cooperative        HISTORY:  Past Medical History:   Diagnosis Date    Back pain     Congestive heart failure (HCC)     Depressive disorder     Hypertension     Neurogenic bladder     Open wound of leg     Osteoarthritis     Osteomyelitis (HCC)     Paraplegia (HCC)     Seizure ([address] )     Thrombosis     Ulcer of ankle ([address] )     Urinary retention      Family History   Problem Relation Age of Onset    Diabetes Father     Kidney cancer Father     Alzheimer's disease Mother     Bone cancer Paternal Uncle     Lung cancer Paternal Uncle      Social History     Socioeconomic History    Marital status: /Civil Union     Spouse name: Not on file    Number of children: Not on file    Years of education: Not on file    Highest education level: Not on file   Occupational History    Not on file   Social Needs    Financial resource strain: Not on file    Food insecurity     Worry: Not on file     Inability: Not on file    Transportation needs     Medical: Not on file     Non-medical: Not on file   Tobacco Use    Smoking status: Never Smoker    Smokeless tobacco: Never Used   Substance and Sexual Activity    Alcohol use: No    Drug use: No    Sexual activity: Not on file   Lifestyle    Physical activity     Days per week: Not on file     Minutes per session: Not on file    Stress: Not on file   Relationships    Social connections     Talks on phone: Not on file     Gets together: Not on file     Attends Jain service: Not on file     Active member of club or organization: Not on file     Attends meetings of clubs or organizations: Not on file     Relationship status: Not on file    Intimate partner violence     Fear of current or ex partner: Not on file     Emotionally abused: Not on file     Physically abused: Not on file     Forced sexual activity: Not on file   Other Topics Concern    Not on file   Social History Narrative    Not on file       Allergies: Allergies   Allergen Reactions    Latex     Other Other (See Comments)     Irritation  Latex gloves OK per pt  Review of Systems     Review of Systems   Constitutional: Negative for chills and fever  Respiratory: Negative for cough and shortness of breath  Cardiovascular: Negative for chest pain  Psychiatric/Behavioral: Positive for agitation  Negative for confusion  Medications and orders     All medications reviewed and updated in Nursing Home EMR  Objective     Vitals: 98 2F, HR 78, RR 16, /92    Physical Exam  Vitals signs and nursing note reviewed  Constitutional:       General: She is not in acute distress  Appearance: She is ill-appearing (chronically)  She is not toxic-appearing or diaphoretic  HENT:      Mouth/Throat:      Mouth: Mucous membranes are dry  Eyes:      Extraocular Movements: Extraocular movements intact  Pupils: Pupils are equal, round, and reactive to light  Cardiovascular:      Rate and Rhythm: Normal rate and regular rhythm  Pulses: Normal pulses  Heart sounds: Normal heart sounds  Pulmonary:      Effort: Pulmonary effort is normal       Breath sounds: Normal breath sounds  Comments: Breath sounds normal on auscultation of anterior chest wall, unable to auscultate back as pt is immobile but is also not very cooperative with exam  Abdominal:      General: Bowel sounds are normal       Palpations: Abdomen is soft  Musculoskeletal:      Right lower leg: Edema present  Left lower leg: Edema present  Neurological:      Mental Status: She is alert and oriented to person, place, and time  Psychiatric:      Comments: Angry/frustrated affect         Pertinent Laboratory/Diagnostic Studies: The following labs/studies were reviewed please see chart or hospital paperwork for details  CMP - elevated BUN, Cr  Is 0 41 (elevated from prior)    CBC    - Counseling Documentation: patient was counseled regarding: diagnostic results and need for following with neurology for h/o seizures to which she was resistant

## 2021-05-05 NOTE — ASSESSMENT & PLAN NOTE
Was recently taken off of antibiotics by ortho due to gastrointestinal sx's  Pt denies fever, chills, night sweats  Temp today 98 2F  - CRP ordered  If elevated may indicate worsening infection and thus may need further antibiotics

## 2021-06-02 ENCOUNTER — NURSING HOME VISIT (OUTPATIENT)
Dept: GERIATRICS | Facility: OTHER | Age: 71
End: 2021-06-02
Payer: MEDICARE

## 2021-06-02 DIAGNOSIS — F32.0 CURRENT MILD EPISODE OF MAJOR DEPRESSIVE DISORDER, UNSPECIFIED WHETHER RECURRENT (HCC): ICD-10-CM

## 2021-06-02 DIAGNOSIS — I10 ESSENTIAL HYPERTENSION: ICD-10-CM

## 2021-06-02 DIAGNOSIS — M86.60 CHRONIC OSTEOMYELITIS (HCC): ICD-10-CM

## 2021-06-02 DIAGNOSIS — I82.409 DEEP VEIN THROMBOSIS (DVT) OF LOWER EXTREMITY, UNSPECIFIED CHRONICITY, UNSPECIFIED LATERALITY, UNSPECIFIED VEIN (HCC): Primary | ICD-10-CM

## 2021-06-02 PROCEDURE — 99309 SBSQ NF CARE MODERATE MDM 30: CPT | Performed by: FAMILY MEDICINE

## 2021-06-02 NOTE — ASSESSMENT & PLAN NOTE
Pt tearful today because of being told she "was being mean" to roommate  States she did not feel sad prior to being told this today  Currently on sertraline 100mg qd  Plan:  - continue current dose of sertraline  If patient continues to be tearful consider increasing dose of sertraline    - currently on low dose mirtazepine 15mg qhs for insomnia  May consider increasing dose but would need to be given in the am as it is more activating

## 2021-06-02 NOTE — PROGRESS NOTES
[address]   [address] [facility] Amandeep [place]  Progress Note    NAME: Yuniel Almazan  AGE: 80 y o  SEX: female [id number]    DATE OF ENCOUNTER: 6/2/2021    Code status:  Level 1 Full Code     Assessment and Plan     DVT (deep venous thrombosis) (HCC)  Chronic  Last INR 2 7, at target  Continue coumadin dose and INR q 2 weeks (next due on 6/7)     HTN (hypertension)  BP stable  Continue current regimen  Monitor BP  Chronic osteomyelitis ([facility] [address] )  Of left hip  S/p I&D on 3/21  Following with ortho  IR drain in place  Depression  Pt tearful today because of being told she "was being mean" to roommate  States she did not feel sad prior to being told this today  Currently on sertraline 100mg qd  Plan:  - continue current dose of sertraline  If patient continues to be tearful consider increasing dose of sertraline    - currently on low dose mirtazepine 15mg qhs for insomnia  May consider increasing dose but would need to be given in the am as it is more activating  All medications and routine orders were reviewed and updated as needed  Plan discussed with: Patient    Chief Complaint     Seen for follow-up at [address] [place] [address], a 80 y o  female  has a past medical history of Back pain, Congestive heart failure ([address] ), Depressive disorder, Hypertension, Neurogenic bladder, Open wound of leg, Osteoarthritis, Osteomyelitis ([address] ), Paraplegia ([address] ), Seizure ([address] ), Thrombosis, Ulcer of ankle ([address] ), and Urinary retention        Today she is tearful  Reports  stopped by to talk to her "because two separate people have complained that I was mean to my new roommate but I didn't say anything mean to her " She states this bothers her a lot as this is "defamation " States before this she didn't feel sad  Denies any other concerns or complaints at this time        HISTORY:  Past Medical History:   Diagnosis Date    Back pain     Congestive heart failure (HCC)     Depressive disorder     Hypertension     Neurogenic bladder     Open wound of leg     Osteoarthritis     Osteomyelitis (HCC)     Paraplegia (HCC)     Seizure ([facility] [address] )     Thrombosis     Ulcer of ankle ([facility] [address] )     Urinary retention      Family History   Problem Relation Age of Onset    Diabetes Father     Kidney cancer Father     Alzheimer's disease Mother     Bone cancer Paternal Uncle     Lung cancer Paternal Uncle      Social History     Socioeconomic History    Marital status: /Civil Union     Spouse name: Not on file    Number of children: Not on file    Years of education: Not on file    Highest education level: Not on file   Occupational History    Not on file   Social Needs    Financial resource strain: Not on file    Food insecurity     Worry: Not on file     Inability: Not on file    Transportation needs     Medical: Not on file     Non-medical: Not on file   Tobacco Use    Smoking status: Never Smoker    Smokeless tobacco: Never Used   Substance and Sexual Activity    Alcohol use: No    Drug use: No    Sexual activity: Not on file   Lifestyle    Physical activity     Days per week: Not on file     Minutes per session: Not on file    Stress: Not on file   Relationships    Social connections     Talks on phone: Not on file     Gets together: Not on file     Attends Gnosticist service: Not on file     Active member of club or organization: Not on file     Attends meetings of clubs or organizations: Not on file     Relationship status: Not on file    Intimate partner violence     Fear of current or ex partner: Not on file     Emotionally abused: Not on file     Physically abused: Not on file     Forced sexual activity: Not on file   Other Topics Concern    Not on file   Social History Narrative    Not on file       Allergies:   Allergies   Allergen Reactions    Latex     Other Other (See Comments)     Irritation  Latex gloves OK per pt  Review of Systems     Review of Systems   Constitutional: Negative for chills, diaphoresis, fatigue and fever  Respiratory: Negative for shortness of breath  Cardiovascular: Negative for chest pain  Psychiatric/Behavioral: Positive for dysphoric mood  Negative for self-injury and suicidal ideas  The patient is not nervous/anxious  All other systems reviewed and are negative  Medications and orders     All medications reviewed and updated in Nursing Home EMR  Objective     Vitals: 96 9F, RR 16, HR 69, 147/72    Physical Exam  Vitals signs reviewed  Constitutional:       General: She is not in acute distress  Appearance: She is ill-appearing (chronically)  She is not toxic-appearing or diaphoretic  HENT:      Mouth/Throat:      Mouth: Mucous membranes are moist    Eyes:      Extraocular Movements: Extraocular movements intact  Pupils: Pupils are equal, round, and reactive to light  Cardiovascular:      Rate and Rhythm: Normal rate and regular rhythm  Pulses: Normal pulses  Heart sounds: Normal heart sounds  Pulmonary:      Effort: Pulmonary effort is normal       Breath sounds: Normal breath sounds  Comments: Only able to auscultate anterolateral chest wall as patient unable to turn  Abdominal:      General: Bowel sounds are normal       Palpations: Abdomen is soft  Musculoskeletal:      Right lower leg: Edema present  Left lower leg: Edema present  Comments: B/L foot deformities   Skin:     General: Skin is warm and dry  Comments: Refuses to let me examine healing sacral wound and left hip wound  Neurological:      Mental Status: She is alert  Comments: Paraplegia    Psychiatric:      Comments: tearful         Pertinent Laboratory/Diagnostic Studies: The following labs/studies were reviewed please see chart or hospital paperwork for details    CBC  CMP: BUN:Cr ratio elevated, Cr not elevated above baseline      - Counseling Documentation: patient was counseled regarding: instructions for management and impressions

## 2021-07-12 ENCOUNTER — NURSING HOME VISIT (OUTPATIENT)
Dept: GERIATRICS | Facility: OTHER | Age: 71
End: 2021-07-12
Payer: MEDICARE

## 2021-07-12 VITALS
RESPIRATION RATE: 19 BRPM | TEMPERATURE: 97 F | SYSTOLIC BLOOD PRESSURE: 144 MMHG | DIASTOLIC BLOOD PRESSURE: 75 MMHG | OXYGEN SATURATION: 97 % | HEART RATE: 70 BPM

## 2021-07-12 DIAGNOSIS — M86.60 CHRONIC OSTEOMYELITIS (HCC): Primary | ICD-10-CM

## 2021-07-12 DIAGNOSIS — I48.91 ATRIAL FIBRILLATION WITH RVR (HCC): ICD-10-CM

## 2021-07-12 DIAGNOSIS — G20 PARKINSON'S DISEASE (HCC): ICD-10-CM

## 2021-07-12 DIAGNOSIS — I82.409 DEEP VEIN THROMBOSIS (DVT) OF LOWER EXTREMITY, UNSPECIFIED CHRONICITY, UNSPECIFIED LATERALITY, UNSPECIFIED VEIN (HCC): ICD-10-CM

## 2021-07-12 DIAGNOSIS — F32.0 CURRENT MILD EPISODE OF MAJOR DEPRESSIVE DISORDER, UNSPECIFIED WHETHER RECURRENT (HCC): ICD-10-CM

## 2021-07-12 PROCEDURE — 99309 SBSQ NF CARE MODERATE MDM 30: CPT | Performed by: NURSE PRACTITIONER

## 2021-07-12 NOTE — PROGRESS NOTES
[facility]  [address]  [phone]  [facility]  [address]      NAME: Nicolas Huffman  AGE: 80 y o  SEX: female    DATE OF ENCOUNTER: 7/12/2021    Assessment and Plan     Problem List Items Addressed This Visit        Cardiovascular and Mediastinum    DVT (deep venous thrombosis) (HCC)     · History of DVTs  · Currently on coumadin for anticoagulation  · Last INR therapeutic  · Continue with Coumadin for anticoagulation   · Continue to monitor INR         Atrial fibrillation with RVR (HCC)     · Stable and asymptomatic  · Denies chest pain and palpitations  · Continue current medication regimen  · Continue with Coumadin for anticoagulation  · Follow-up with cardiology as recommended  · Continue to monitor            Nervous and Auditory    Parkinson's disease ([facility] [address] )     · Remains stable  · Continue with current medication regimen  · Follow up with neurology as recommended  · Continue to monitor             Musculoskeletal and Integument    Chronic osteomyelitis ([facility] [address] ) - Primary     · Drain removed last month  · Area appears more swollen  · STAT CT L hip ordered  · CBC, CMP, and CRP for tomorrow  · Afebrile   · Continue to monitor closely   · Sutures remain in place, examined bedside with Dr Bishop Taylor, Dr Bishop Taylor to remove sutures   · Follow up with orthopedics and plastic surgery as recommended             Other    Depression     · Stable  · No reported episodes of crying  · Denies SI/HI  · Continue with mirtazapine qhs  · Continue to provide emotional support               No orders of the defined types were placed in this encounter       - Counseling Documentation: patient was counseled regarding: risks and benefits of treatment options and importance of compliance with treatment    Chief Complaint     No chief complaint on file  History of Present Illness     Lisandro Perez is a 78-year-old female [facility] for long-term care    She is being seen today for monthly follow-up on her chronic medical conditions  Her comorbidities include CHF, AFib, paraplegia, CHF, and seizures  Upon examination patient was lying in bed, resting  she appeared comfortable in no acute distress  she initially was frustrated and did not want to participate in exam   Per nursing her appetite is at her baseline  She is sleeping well at night  She denies headaches, dizziness, lightheadedness, chest pain, shortness breath, cough, abdominal pain, and GI  upset  Per nursing no other acute concerns or issues at this time      The following portions of the patient's history were reviewed and updated as appropriate: allergies, current medications, past family history, past medical history, past social history, past surgical history and problem list     Review of Systems     Review of Systems   Constitutional: Negative for activity change, appetite change, chills, fatigue and fever  HENT: Negative for congestion, ear pain and sore throat  Eyes: Negative for visual disturbance  Respiratory: Negative for cough, shortness of breath and wheezing  Cardiovascular: Negative for chest pain and palpitations  Gastrointestinal: Negative for abdominal distention, abdominal pain, constipation, diarrhea, nausea and vomiting  Genitourinary: Negative for dysuria and hematuria  Musculoskeletal: Positive for gait problem (paraplegic )  Negative for arthralgias and back pain  Skin: Positive for wound (on buttock)  Neurological: Positive for weakness (secondry tp chornic medical conditions )  Negative for dizziness, seizures, speech difficulty, light-headedness and headaches  Psychiatric/Behavioral: Positive for dysphoric mood  Negative for confusion and sleep disturbance         Active Problem List     Patient Active Problem List   Diagnosis    Neurogenic bladder    Chronic osteomyelitis (HCC)    Depression    DVT (deep venous thrombosis) (HCC)    Heart murmur    HTN (hypertension)  Hyperlipidemia    Hyponatremia    Pressure ulcer of contiguous region involving left buttock and hip, unstageable ([facility])    Kidney lesion, native, right    Neurogenic bowel    Paralysis ([address] )    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[facility] [address] )    Parkinson's disease ([facility] [address] )    Postoperative anemia due to acute blood loss    Seizure disorder ([facility])    Protein-calorie malnutrition ([facility])    Chronic right shoulder pain    CHF (congestive heart failure) ([facility])    Septic arthritis of hip ([facility])    Atrial fibrillation with RVR ([facility])    Chronic anticoagulation    Pressure injury of 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 ([facility])    Loose stools    Anemia due to chronic illness    Indigestion    Dehydration       Objective     /75   Pulse 70   Temp (!) 97 °F (36 1 °C)   Resp 19   SpO2 97%     Physical Exam  Vitals and nursing note reviewed  Constitutional:       General: She is not in acute distress  Appearance: She is obese  She is not ill-appearing  Comments: Ill appearing, chronically   HENT:      Head: Normocephalic and atraumatic  Mouth/Throat:      Mouth: Mucous membranes are moist    Eyes:      General:         Right eye: No discharge  Left eye: No discharge  Cardiovascular:      Rate and Rhythm: Normal rate and regular rhythm  Pulses: Normal pulses  Heart sounds: Heart sounds are distant  No murmur heard  Pulmonary:      Effort: Pulmonary effort is normal  No respiratory distress  Breath sounds: No wheezing  Comments: diminished b/l  Abdominal:      General: Bowel sounds are normal  There is no distension  Palpations: Abdomen is soft  Tenderness: There is no abdominal tenderness  Genitourinary:     Comments: suprapubic in place    Musculoskeletal:         General: Deformity (b/l foot deformities) present  No signs of injury  Right lower leg: Edema (midl) present  Left lower leg: Edema (mild) present     Skin:     General: Skin is warm and dry       Coloration: Skin is not jaundiced  Findings: Rash (excoriation adbominal folds) and wound (surgical scar L hip ) present  No bruising  Neurological:      General: No focal deficit present  Mental Status: She is alert and oriented to person, place, and time  Mental status is at baseline  Motor: Weakness (paraplegic) present  Gait: Gait abnormal (bedbound)  Psychiatric:         Mood and Affect: Mood normal  Affect is flat  Behavior: Behavior is agitated  Pertinent Laboratory/Diagnostic Studies:  Labs reviewed in facility paper chart  Current Medications   Medications were reviewed and updated  Please refer to paper chart for updated list of medications  Davina Mullen [address]  7/12/2021 7:36 PM    Please note:  Voice-recognition software may have been used in the preparation of this document  Occasional wrong word or "sound-alike" substitutions may have occurred due to the inherent limitations of voice recognition software  Interpretation should be guided by context

## 2021-07-12 NOTE — ASSESSMENT & PLAN NOTE
· Stable and asymptomatic  · Denies chest pain and palpitations  · Continue current medication regimen  · Continue with Coumadin for anticoagulation  · Follow-up with cardiology as recommended  · Continue to monitor

## 2021-07-12 NOTE — ASSESSMENT & PLAN NOTE
· Stable  · No reported episodes of crying  · Denies SI/HI  · Continue with mirtazapine qhs  · Continue to provide emotional support

## 2021-07-12 NOTE — ASSESSMENT & PLAN NOTE
· Drain removed last month  · Area appears more swollen  · STAT CT L hip ordered  · CBC, CMP, and CRP for tomorrow  · Afebrile   · Continue to monitor closely   · Sutures remain in place, examined bedside with Dr Anival Gee to remove sutures   · Follow up with orthopedics and plastic surgery as recommended

## 2021-07-12 NOTE — ASSESSMENT & PLAN NOTE
· History of DVTs  · Currently on coumadin for anticoagulation  · Last INR therapeutic  · Continue with Coumadin for anticoagulation   · Continue to monitor INR

## 2021-07-12 NOTE — ASSESSMENT & PLAN NOTE
· Remains stable  · Continue with current medication regimen  · Follow up with neurology as recommended  · Continue to monitor

## 2021-09-15 ENCOUNTER — NURSING HOME VISIT (OUTPATIENT)
Dept: GERIATRICS | Facility: OTHER | Age: 71
End: 2021-09-15
Payer: MEDICARE

## 2021-09-15 DIAGNOSIS — G20 PARKINSON'S DISEASE (HCC): ICD-10-CM

## 2021-09-15 DIAGNOSIS — I82.409 DEEP VEIN THROMBOSIS (DVT) OF LOWER EXTREMITY, UNSPECIFIED CHRONICITY, UNSPECIFIED LATERALITY, UNSPECIFIED VEIN (HCC): ICD-10-CM

## 2021-09-15 DIAGNOSIS — I10 ESSENTIAL HYPERTENSION: ICD-10-CM

## 2021-09-15 DIAGNOSIS — G82.20 PARAPLEGIA FOLLOWING SPINAL CORD INJURY (HCC): Primary | ICD-10-CM

## 2021-09-15 DIAGNOSIS — F32.0 CURRENT MILD EPISODE OF MAJOR DEPRESSIVE DISORDER, UNSPECIFIED WHETHER RECURRENT (HCC): ICD-10-CM

## 2021-09-15 PROCEDURE — 99308 SBSQ NF CARE LOW MDM 20: CPT | Performed by: FAMILY MEDICINE

## 2021-09-15 NOTE — ASSESSMENT & PLAN NOTE
No thoughts of harming self/others  Continue mirtazepine 7 5 mg qhs  And sertraline 100mg qd  Monitor sxs

## 2021-09-15 NOTE — PROGRESS NOTES
[address]   [address] [facility] Amandeep [place]  Progress Note    NAME: Cleveland Lee  AGE: 80 y o  SEX: female [id number]    DATE OF ENCOUNTER: 9/15/2021    Code status:  full code    Assessment and Plan     Problem List Items Addressed This Visit        Cardiovascular and Mediastinum    DVT (deep venous thrombosis) (HCC)     Stable on coumadin  INR therapeutic  Continue coumadin and INR q 2 wks  HTN (hypertension)     BP stable and at target  Continue current medication regimen  Nervous and Auditory    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[address] ) - Primary     Wheelchair bound  - continue skin exams q shift and frequent repositioning  Parkinson's disease ([address] )     Stable   Continue to monitor   Continue current medication regiment with Carbidopa/levodopa             Other    Depression     No thoughts of harming self/others  Continue mirtazepine 7 5 mg qhs  And sertraline 100mg qd  Monitor sxs  All medications and routine orders were reviewed and updated as needed  Plan discussed with: Patient    Chief Complaint     Seen for follow-up at [address] [place] [address], a 80 y o  female  has a past medical history of Back pain, Congestive heart failure ([address] ), Depressive disorder, Hypertension, Neurogenic bladder, Open wound of leg, Osteoarthritis, Osteomyelitis ([address] ), Paraplegia ([address] ), Seizure ([address] ), Thrombosis, Ulcer of ankle ([address] ), and Urinary retention        Today she spoke minimally  She did state that her only complaint was that she wasn't awakened when her lunch was dropped off which she was eating during the encounter  Denies pain  No thoughts of harming self or others  Per nurse she has not been taking her pills today  Advised nurse to remove medications and give them to her when she is scheduled to take them nurse agreeable       HISTORY:  Past Medical History: Diagnosis Date    Back pain     Congestive heart failure (HCC)     Depressive disorder     Hypertension     Neurogenic bladder     Open wound of leg     Osteoarthritis     Osteomyelitis (HCC)     Paraplegia (HCC)     Seizure ([facility] [address] )     Thrombosis     Ulcer of ankle ([facility] [address] )     Urinary retention      Family History   Problem Relation Age of Onset    Diabetes Father     Kidney cancer Father     Alzheimer's disease Mother     Bone cancer Paternal Uncle     Lung cancer Paternal Uncle      Social History     Socioeconomic History    Marital status: /Civil Union     Spouse name: Not on file    Number of children: Not on file    Years of education: Not on file    Highest education level: Not on file   Occupational History    Not on file   Tobacco Use    Smoking status: Never Smoker    Smokeless tobacco: Never Used   Substance and Sexual Activity    Alcohol use: No    Drug use: No    Sexual activity: Not on file   Other Topics Concern    Not on file   Social History Narrative    Not on file     Social Determinants of Health     Financial Resource Strain:     Difficulty of Paying Living Expenses:    Food Insecurity:     Worried About Running Out of Food in the Last Year:     Ran Out of Food in the Last Year:    Transportation Needs:     Lack of Transportation (Medical):  Lack of Transportation (Non-Medical):    Physical Activity:     Days of Exercise per Week:     Minutes of Exercise per Session:    Stress:     Feeling of Stress :    Social Connections:     Frequency of Communication with Friends and Family:     Frequency of Social Gatherings with Friends and Family:     Attends Yarsani Services:     Active Member of Clubs or Organizations:     Attends Club or Organization Meetings:     Marital Status:    Intimate Partner Violence:     Fear of Current or Ex-Partner:     Emotionally Abused:     Physically Abused:     Sexually Abused: Allergies:   Allergies Allergen Reactions    Latex     Other Other (See Comments)     Irritation  Latex gloves OK per pt  Review of Systems     Review of Systems   Constitutional: Negative for chills, fatigue and fever  Respiratory: Negative for shortness of breath  Cardiovascular: Negative for chest pain  Psychiatric/Behavioral: Negative for dysphoric mood  The patient is not nervous/anxious  Medications and orders     All medications reviewed and updated in Nursing Home EMR  Objective     Vitals: 118/65, HR 77    Physical Exam  Constitutional:       General: She is not in acute distress  Appearance: She is ill-appearing (chronically)  She is not toxic-appearing  Cardiovascular:      Rate and Rhythm: Normal rate and regular rhythm  Pulses: Normal pulses  Heart sounds: Normal heart sounds  Pulmonary:      Effort: Pulmonary effort is normal       Breath sounds: Normal breath sounds  Comments: On anterior chest wall auscultation  Pt unable to turn to side for examination of back  Musculoskeletal:      Cervical back: Neck supple  Right lower leg: Edema present  Left lower leg: Edema present  Skin:     General: Skin is warm and dry  Comments: Pt not cooperative with low back examination to assess pressure ulcer  Neurological:      Mental Status: She is oriented to person, place, and time  Psychiatric:      Comments: Mood: frustrated, minimal conversation  Pertinent Laboratory/Diagnostic Studies: The following labs/studies were reviewed please see chart or hospital paperwork for details    PT, INR     - Counseling Documentation: patient was counseled regarding: importance of compliance with treatment

## 2022-01-11 ENCOUNTER — NURSING HOME VISIT (OUTPATIENT)
Dept: WOUND CARE | Facility: HOSPITAL | Age: 72
End: 2022-01-11
Payer: MEDICARE

## 2022-01-11 DIAGNOSIS — T14.8XXA OPEN WOUND: ICD-10-CM

## 2022-01-11 DIAGNOSIS — G82.20 PARAPLEGIA FOLLOWING SPINAL CORD INJURY (HCC): Primary | ICD-10-CM

## 2022-01-11 DIAGNOSIS — T14.8XXA DEEP TISSUE INJURY: ICD-10-CM

## 2022-01-11 PROCEDURE — 99305 1ST NF CARE MODERATE MDM 35: CPT | Performed by: NURSE PRACTITIONER

## 2022-01-11 NOTE — ASSESSMENT & PLAN NOTE
Right lower leg  - 100% eschar, with no obvious sign of infection  - local wound care - hydraguard  - follow up next week

## 2022-01-11 NOTE — PROGRESS NOTES
[address] MANAGEMENT   AND [facility]PER[facility]INE [facility]ENTER       Patient ID: Melo Summers is a 80 y o  female Date of Birth 1950     Location of Service: [place] [address]    Performed wound round with: supervisor      Chief Complaint   Patient presents with   [facility] Patient Visit     Right foot and right lower leg       Wound Instructions:  Orders Placed This Encounter   Procedures    Wound cleansing and dressings     Wound:  Right plantar and right lower leg  Discontinue previous wound order  Cleanse the wound bed with soap and water, pat dry  Apply hydraguard to wound bed  Frequency : twice a day and prn for soiling  Continue prevalon boots when in bed  Offload all wounds  Turn and reposition frequently, maximum of every two hours  Instruct / Assist with weight shifting every 15 - 20 minutes when in chair  Increase protein intake  Monitor for any sign of infection or worsening, inform PCP or patient's primary physician in your facility  Standing Status:   Future     Standing Expiration Date:   1/11/2023       Allergies  Latex and Other      Assessment & Plan:  1  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[facility] [address] )  -     Wound cleansing and dressings; Future    2  Deep tissue injury  Assessment & Plan:  Right plantar  -wound size is 6 x 3 2 x 0 1 cm  , purple nonblanchable  - local wound care with hydraguard  - if worsened next week, will refer to podiatry      3  Open wound  Assessment & Plan:  Right lower leg  - 100% eschar, with no obvious sign of infection  - local wound care - hydraguard  - follow up next week             Subjective: This is a 41-year-old female referred to our service for wound on the right foot plantar and right lower leg  Patient is asleep during visit and was not able to provide information related to the wound  Review of Systems   Reason unable to perform ROS: asleep  Objective: There were no vitals taken for this visit      Physical Exam  HENT:      Head: Normocephalic  Nose: Nose normal    Eyes:      Comments: asleep   Cardiovascular:      Rate and Rhythm: Normal rate  Pulmonary:      Effort: Pulmonary effort is normal    Abdominal:      Palpations: Abdomen is soft  Genitourinary:     Comments: With angel  Musculoskeletal:      Comments: paraplegia   Skin:     Findings: Lesion present  Comments: Right foot plantar - 6 x 3 2 x 0 1 cm  , purple, non-blanchable, no obvious sign of infection    Right lower extremity - 6 x 1 5 x 0 1 cm , 100% eschar, no obvious sign of infection, no drainage   Neurological:      Mental Status: She is alert  Gait: Gait abnormal    Psychiatric:      Comments: asleep              Procedures           Patient's care was coordinated with nursing facility staff  Recent vitals, labs and updated medications were reviewed on EMR or chart system of facility   Past Medical, surgical, social, medication and allergy history and patient's previous records were reviewed and updated as appropriate:     Patient Active Problem List   Diagnosis    Neurogenic bladder    Chronic osteomyelitis ([address] )    Depression    DVT [facility]deep venous thrombosis) ([facility] [address] [facility]    Heart murmur    HTN (hypertension)    Hyperlipidemia    Hyponatremia    Pressure ulcer of contiguous region involving left buttock and hip, unstageable ([address] )    Kidney lesion, native, right    Neurogenic bowel    Paralysis ([address] )    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[facility] [address] )    Parkinson's disease ([address] )    Postoperative anemia due to acute blood loss    Seizure disorder ([address] )    Protein-calorie malnutrition ([address] )    Chronic right shoulder pain    CHF (congestive heart failure) ([address] )    Septic arthritis of hip ([address] )    [facility] fibrillation with RVR ([facility])    Chronic anticoagulation    Pressure injury of 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 ([facility])    Loose stools    Anemia due to chronic illness    Indigestion    Dehydration    Deep tissue injury    Open wound     Past Medical History:   Diagnosis Date    Back pain     Congestive heart failure (HCC)     Depressive disorder     Hypertension     Neurogenic bladder     Open wound of leg     Osteoarthritis     [facility]omyelitis [facility] [address] [facility]     Paraplegia (HCC)     [facility]     Thrombosis     [facility]cer [facility]     Urinary retention      Past Surgical History:   Procedure Laterality Date    CYSTOSCOPY      SKIN GRAFT       Social History     Socioeconomic History    Marital status: /Civil Union     Spouse name: None    Number of children: None    Years of education: None    Highest education level: None   Occupational History    None   Tobacco Use    Smoking status: Never Smoker    Smokeless tobacco: Never Used   Substance and Sexual Activity    Alcohol use: No    Drug use: No    Sexual activity: None   Other Topics Concern    None   Social History Narrative    None     Social Determinants of Health     Financial Resource Strain: Not on file   Food Insecurity: Not on file   Transportation Needs: Not on file   Physical Activity: Not on file   Stress: Not on file   Social Connections: Not on file   Intimate Partner Violence: Not on file   Housing Stability: Not on file        Current Outpatient Medications:     acetaminophen (TYLENOL) 325 mg tablet, Take 650 mg by mouth every 4 (four) hours as needed, Disp: , Rfl:     amLODIPine-benazepril (LOTREL) 10-20 MG per capsule, Take by mouth , Disp: , Rfl:     atorvastatin (LIPITOR) 20 mg tablet, Take 20 mg by mouth daily , Disp: , Rfl:     Biotin 10 MG CAPS, Take 10 mg by mouth daily, Disp: , Rfl:     bisacodyl (Dulcolax) 10 mg suppository, Insert 10 mg into the rectum daily, Disp: , Rfl:     carbidopa-levodopa (SINEMET)  mg per tablet, Take 1 tablet by mouth 4 (four) times a day, Disp: , Rfl:     Cranberry 450 MG TABS, Take 450 mg by mouth, Disp: , Rfl:     famotidine (PEPCID) 20 mg tablet, Take 20 mg by mouth 2 (two) times a day, Disp: , Rfl:    furosemide (LASIX) 20 mg tablet, Take 20 mg by mouth daily, Disp: , Rfl:     magnesium hydroxide (MILK OF MAGNESIA) 400 mg/5 mL oral suspension, Take by mouth daily as needed for constipation, Disp: , Rfl:     metoprolol tartrate (LOPRESSOR) 50 mg tablet, Take 50 mg by mouth 2 (two) times a day, Disp: , Rfl:     mirtazapine (REMERON) 15 mg tablet, Take 7 5 mg by mouth daily at bedtime, Disp: , Rfl:     Multiple Vitamin (DAILY VALUE MULTIVITAMIN) TABS, Take 1 tablet by mouth daily , Disp: , Rfl:     Multiple Vitamins-Minerals (HAIR SKIN NAILS PO), Take 3 tablets by mouth daily, Disp: , Rfl:     Nutritional Supplements (Micha) PACK, Take by mouth, Disp: , Rfl:     Omega-3 Fatty Acids (FISH OIL) 1,000 mg, Take 1,000 mg by mouth daily , Disp: , Rfl:     oxyCODONE (ROXICODONE) 5 mg immediate release tablet, Take 1 tablet (5 mg total) by mouth every 6 (six) hours as needed for moderate painMax Daily Amount: 20 mg, Disp: 15 tablet, Rfl: 0    phenytoin (DILANTIN) 100 mg ER capsule, Take 100 mg by mouth every 12 (twelve) hours , Disp: , Rfl:     potassium chloride (K-DUR,KLOR-CON) 10 mEq tablet, , Disp: , Rfl:     psyllium (Metamucil) 0 52 g capsule, Take 0 52 g by mouth daily, Disp: , Rfl:     saccharomyces boulardii (Florastor) 250 mg capsule, Take 250 mg by mouth 2 (two) times a day, Disp: , Rfl:     senna (SENOKOT) 8 6 MG tablet, Take 8 6 mg by mouth 2 (two) times a day, Disp: , Rfl:     sertraline (ZOLOFT) 25 mg tablet, Take 100 mg by mouth daily, Disp: , Rfl:     sodium phosphate-biphosphate (FLEET) 7-19 g 118 mL enema, Insert 1 enema into the rectum, Disp: , Rfl:     vitamin B-12 (CYANOCOBALAMIN) 250 MCG tablet, Take 500 mcg by mouth daily, Disp: , Rfl:     warfarin (COUMADIN) 6 mg tablet, Take 6 mg by mouth daily, Disp: , Rfl:   Family History   Problem Relation Age of Onset    Diabetes Father     Kidney cancer Father     Alzheimer's disease Mother     Bone cancer Paternal Uncle     Lung cancer Paternal Uncle               Coordination of Care: Supervisor aware of the treatment plan    Patient / Staff education : Staff was given education on sign of infection and pressure ulcer prevention  Staff verbalized understanding     Follow up :  Return in about 1 week (around 1/18/2022)  Voice-recognition software may have been used in the preparation of this document  Occasional wrong word or "sound-alike" substitutions may have occurred due to the inherent limitations of voice recognition software  Interpretation should be guided by context        SVETLANA Morgan

## 2022-01-11 NOTE — PATIENT INSTRUCTIONS
Orders Placed This Encounter   Procedures    Wound cleansing and dressings     Wound:  Right plantar and right lower leg  Discontinue previous wound order  Cleanse the wound bed with soap and water, pat dry  Apply hydraguard to wound bed  Frequency : twice a day and prn for soiling  Continue prevalon boots when in bed  Offload all wounds  Turn and reposition frequently, maximum of every two hours  Instruct / Assist with weight shifting every 15 - 20 minutes when in chair  Increase protein intake  Monitor for any sign of infection or worsening, inform PCP or patient's primary physician in your facility       Standing Status:   Future     Standing Expiration Date:   1/11/2023

## 2022-01-11 NOTE — ASSESSMENT & PLAN NOTE
Right plantar  -wound size is 6 x 3 2 x 0 1 cm  , purple nonblanchable  - local wound care with hydraguard  - if worsened next week, will refer to podiatry

## 2022-01-18 ENCOUNTER — NURSING HOME VISIT (OUTPATIENT)
Dept: WOUND CARE | Facility: HOSPITAL | Age: 72
End: 2022-01-18
Payer: MEDICARE

## 2022-01-18 DIAGNOSIS — G82.20 PARAPLEGIA FOLLOWING SPINAL CORD INJURY (HCC): Primary | ICD-10-CM

## 2022-01-18 DIAGNOSIS — T14.8XXA OPEN WOUND: ICD-10-CM

## 2022-01-18 DIAGNOSIS — T14.8XXA DEEP TISSUE INJURY: ICD-10-CM

## 2022-01-18 PROCEDURE — 99308 SBSQ NF CARE LOW MDM 20: CPT | Performed by: NURSE PRACTITIONER

## 2022-01-18 NOTE — ASSESSMENT & PLAN NOTE
Right plantar  -wound size almost the same as last week, 6 x 3 x 0 1 cm  , compared to last week measurement of 6 x 3 2 x 0 1 cm  , purple nonblanchable  - local wound care with hydraguard  - ordered arterial duplex  - referred to podiatry

## 2022-01-18 NOTE — PATIENT INSTRUCTIONS
Orders Placed This Encounter   Procedures    Wound cleansing and dressings     Wound:  Right plantar and right lower leg  Discontinue previous wound order  Cleanse the wound bed with soap and water, pat dry  Apply hydraguard to wound bed  Frequency : twice a day and prn for soiling    Arterial duplex on the RLE if not done  Refer to podiatry, worsening of the wound on the right plantar  Continue prevalon boots when in bed  Offload all wounds  Turn and reposition frequently, maximum of every two hours  Instruct / Assist with weight shifting every 15 - 20 minutes when in chair  Increase protein intake  Monitor for any sign of infection or worsening, inform PCP or patient's primary physician in your facility       Standing Status:   Future     Standing Expiration Date:   1/18/2023

## 2022-01-18 NOTE — PROGRESS NOTES
[address] MANAGEMENT   AND HYPERBARIC MEDICINE CENTER       Patient ID: Traci Charles is a 80 y o  female Date of Birth 1950     Location of Service: [place]    Performed wound round with: supervisor      Chief Complaint   Patient presents with    Follow Up Wound Care Visit     r right foot and right lower extremity       Wound Instructions:  Orders Placed This Encounter   Procedures    Wound cleansing and dressings     Wound:  Right plantar and right lower leg  Discontinue previous wound order  Cleanse the wound bed with soap and water, pat dry  Apply hydraguard to wound bed  Frequency : twice a day and prn for soiling    Arterial duplex on the RLE if not done  Refer to podiatry, worsening of the wound on the right plantar  Continue prevalon boots when in bed  Offload all wounds  Turn and reposition frequently, maximum of every two hours  Instruct / Assist with weight shifting every 15 - 20 minutes when in chair  Increase protein intake  Monitor for any sign of infection or worsening, inform PCP or patient's primary physician in your facility  Standing Status:   Future     Standing Expiration Date:   1/18/2023       Allergies  Latex and Other      Assessment & Plan:  1  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[facility] [address] )  -     Wound cleansing and dressings; Future    2  Deep tissue injury  Assessment & Plan:  Right plantar  -wound size almost the same as last week, 6 x 3 x 0 1 cm  , compared to last week measurement of 6 x 3 2 x 0 1 cm  , purple nonblanchable  - local wound care with hydraguard  - ordered arterial duplex  - referred to podiatry    Orders:  -     Wound cleansing and dressings; Future    3  Open wound  Assessment & Plan:  Right lower leg  - 100% eschar, with no obvious sign of infection, stable  - local wound care - hydraguard  - follow up next week             Subjective: Followup for wound on the right plantar and right lower leg  Received patient in bed, asleep  Staff did not report any new wounds  No significant issues related to the wound  Review of Systems   Reason unable to perform ROS: asleep  Objective: There were no vitals taken for this visit  Physical Exam  HENT:      Head: Normocephalic  Nose: Nose normal    Eyes:      Comments: asleep   Cardiovascular:      Rate and Rhythm: Normal rate  Pulmonary:      Effort: Pulmonary effort is normal    Abdominal:      Palpations: Abdomen is soft  Genitourinary:     Comments: With angel  Musculoskeletal:      Comments: paraplegia   Skin:     Findings: Lesion present  Comments: Right foot plantar - 6 x 3 0x 0 1 cm  , purple, non-blanchable, no obvious sign of infection    Right lower extremity - 8 x 2 x 0 1 cm , 50% eschar, 50% epithelial, no obvious sign of infection, no drainage   Neurological:      Mental Status: She is alert  Gait: Gait abnormal    Psychiatric:      Comments: asleep              Procedures           Patient's care was coordinated with nursing facility staff  Recent vitals, labs and updated medications were reviewed on EMR or chart system of facility   Past Medical, surgical, social, medication and allergy history and patient's previous records were reviewed and updated as appropriate:     Patient Active Problem List   Diagnosis    Neurogenic bladder    Chronic osteomyelitis ([facility] [address] )    Depression    DVT (deep venous thrombosis) ([facility] [address] [facility] )    Heart murmur    HTN (hypertension)    Hyperlipidemia    Hyponatremia    Pressure ulcer of contiguous region involving left buttock and hip, unstageable ([facility] [address] [facility] )    Kidney lesion, native, right    Neurogenic bowel    Paralysis ([facility] [address] [facility] )    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[facility] [address] [facility] )    Parkinson's disease ([facility] [facility] )    Postoperative anemia due to acute blood loss    Seizure disorder ([facility] [address] [facility] )    Protein-calorie malnutrition ([address] )    Chronic right shoulder pain    CHF (congestive heart failure) ([address] )    Septic arthritis of hip ([facility] [address] )    [facility]VR ([facility])    Chronic anticoagulation    Pressure injury of 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 ([facility])    Loose stools    Anemia due to chronic illness    Indigestion    Dehydration    Deep tissue injury    Open wound     Past Medical History:   Diagnosis Date    Back pain     Congestive heart failure ([facility])     Depressive disorder     Hypertension     Neurogenic bladder     Open wound of leg     Osteoarthritis     Osteomyelitis [facility] [address] [facility]     Paraplegia ([facility] [address] [facility]     Seizure ([facility] [address] [facility]     Thrombosis     [facility]     Urinary retention      Past Surgical History:   Procedure Laterality Date    CYSTOSCOPY      SKIN GRAFT       Social History     Socioeconomic History    Marital status: /Civil Union     Spouse name: None    Number of children: None    Years of education: None    Highest education level: None   Occupational History    None   Tobacco Use    Smoking status: Never Smoker    Smokeless tobacco: Never Used   Substance and Sexual Activity    Alcohol use: No    Drug use: No    Sexual activity: None   Other Topics Concern    None   Social History Narrative    None     Social Determinants of Health     Financial Resource Strain: Not on file   Food Insecurity: Not on file   Transportation Needs: Not on file   Physical Activity: Not on file   Stress: Not on file   Social Connections: Not on file   Intimate Partner Violence: Not on file   Housing Stability: Not on file        Current Outpatient Medications:     acetaminophen (TYLENOL) 325 mg tablet, Take 650 mg by mouth every 4 (four) hours as needed, Disp: , Rfl:     amLODIPine-benazepril (LOTREL) 10-20 MG per capsule, Take by mouth , Disp: , Rfl:     atorvastatin (LIPITOR) 20 mg tablet, Take 20 mg by mouth daily , Disp: , Rfl:     Biotin 10 MG CAPS, Take 10 mg by mouth daily, Disp: , Rfl:     bisacodyl (Dulcolax) 10 mg suppository, Insert 10 mg into the rectum daily, Disp: , Rfl:     carbidopa-levodopa (SINEMET)  mg per tablet, Take 1 tablet by mouth 4 (four) times a day, Disp: , Rfl:     Cranberry 450 MG TABS, Take 450 mg by mouth, Disp: , Rfl:     famotidine (PEPCID) 20 mg tablet, Take 20 mg by mouth 2 (two) times a day, Disp: , Rfl:     furosemide (LASIX) 20 mg tablet, Take 20 mg by mouth daily, Disp: , Rfl:     magnesium hydroxide (MILK OF MAGNESIA) 400 mg/5 mL oral suspension, Take by mouth daily as needed for constipation, Disp: , Rfl:     metoprolol tartrate (LOPRESSOR) 50 mg tablet, Take 50 mg by mouth 2 (two) times a day, Disp: , Rfl:     mirtazapine (REMERON) 15 mg tablet, Take 7 5 mg by mouth daily at bedtime, Disp: , Rfl:     Multiple Vitamin (DAILY VALUE MULTIVITAMIN) TABS, Take 1 tablet by mouth daily , Disp: , Rfl:     Multiple Vitamins-Minerals (HAIR SKIN NAILS PO), Take 3 tablets by mouth daily, Disp: , Rfl:     Nutritional Supplements (Micha) PACK, Take by mouth, Disp: , Rfl:     Omega-3 Fatty Acids (FISH OIL) 1,000 mg, Take 1,000 mg by mouth daily , Disp: , Rfl:     oxyCODONE (ROXICODONE) 5 mg immediate release tablet, Take 1 tablet (5 mg total) by mouth every 6 (six) hours as needed for moderate painMax Daily Amount: 20 mg, Disp: 15 tablet, Rfl: 0    phenytoin (DILANTIN) 100 mg ER capsule, Take 100 mg by mouth every 12 (twelve) hours , Disp: , Rfl:     potassium chloride (K-DUR,KLOR-CON) 10 mEq tablet, , Disp: , Rfl:     psyllium (Metamucil) 0 52 g capsule, Take 0 52 g by mouth daily, Disp: , Rfl:     saccharomyces boulardii (Florastor) 250 mg capsule, Take 250 mg by mouth 2 (two) times a day, Disp: , Rfl:     senna (SENOKOT) 8 6 MG tablet, Take 8 6 mg by mouth 2 (two) times a day, Disp: , Rfl:     sertraline (ZOLOFT) 25 mg tablet, Take 100 mg by mouth daily, Disp: , Rfl:     sodium phosphate-biphosphate (FLEET) 7-19 g 118 mL enema, Insert 1 enema into the rectum, Disp: , Rfl:     vitamin B-12 (CYANOCOBALAMIN) 250 MCG tablet, Take 500 mcg by mouth daily, Disp: , Rfl:   warfarin (COUMADIN) 6 mg tablet, Take 6 mg by mouth daily, Disp: , Rfl:   Family History   Problem Relation Age of Onset    Diabetes Father     Kidney cancer Father     Alzheimer's disease Mother     Bone cancer Paternal Uncle     Lung cancer Paternal Uncle               Coordination of Care: Supervisor aware of the treatment plan    Patient / Staff education : Staff was given education on sign of infection and pressure ulcer prevention  Staff verbalized understanding     Follow up :  Return in about 1 week (around 1/25/2022)  Voice-recognition software may have been used in the preparation of this document  Occasional wrong word or "sound-alike" substitutions may have occurred due to the inherent limitations of voice recognition software  Interpretation should be guided by context        SVETLANA Lou

## 2022-01-18 NOTE — ASSESSMENT & PLAN NOTE
Right lower leg  - 100% eschar, with no obvious sign of infection, stable  - local wound care - hydraguard  - follow up next week

## 2022-01-25 ENCOUNTER — NURSING HOME VISIT (OUTPATIENT)
Dept: WOUND CARE | Facility: HOSPITAL | Age: 72
End: 2022-01-25
Payer: MEDICARE

## 2022-01-25 DIAGNOSIS — T14.8XXA DEEP TISSUE INJURY: ICD-10-CM

## 2022-01-25 DIAGNOSIS — G82.20 PARAPLEGIA FOLLOWING SPINAL CORD INJURY (HCC): Primary | ICD-10-CM

## 2022-01-25 DIAGNOSIS — T14.8XXA OPEN WOUND: ICD-10-CM

## 2022-01-25 PROCEDURE — 99308 SBSQ NF CARE LOW MDM 20: CPT | Performed by: NURSE PRACTITIONER

## 2022-01-25 NOTE — ASSESSMENT & PLAN NOTE
Right plantar  -wound size almost the same as last week, 6 x 3 x 0 1 cm  ,purple nonblanchable  - local wound care with hydraguard  - ordered arterial duplex - pending result  - referred to podiatry

## 2022-01-25 NOTE — PATIENT INSTRUCTIONS
Orders Placed This Encounter   Procedures    Wound cleansing and dressings     Wound:  Right plantar and right lower leg  Discontinue previous wound order  Cleanse the wound bed with soap and water, pat dry  Apply hydraguard to wound bed  Frequency : twice a day and prn for soiling     Refer to podiatry, worsening of the wound on the right plantar  Continue prevalon boots when in bed  Offload all wounds  Turn and reposition frequently, maximum of every two hours  Instruct / Assist with weight shifting every 15 - 20 minutes when in chair  Increase protein intake  Monitor for any sign of infection or worsening, inform PCP or patient's primary physician in your facility       Standing Status:   Future     Standing Expiration Date:   1/25/2023

## 2022-01-25 NOTE — PROGRESS NOTES
[address] MANAGEMENT   AND HYPERBARIC MEDICINE CENTER       Patient ID: Connie Sanz is a 80 y o  female Date of Birth 1950     [facility]    Performed wound round with: supervisor      Chief Complaint   Patient presents with    Follow Up Wound Care Visit     RLE       Wound Instructions:  Orders Placed This Encounter   Procedures    Wound cleansing and dressings     Wound:  Right plantar and right lower leg  Discontinue previous wound order  Cleanse the wound bed with soap and water, pat dry  Apply hydraguard to wound bed  Frequency : twice a day and prn for soiling     Refer to podiatry, worsening of the wound on the right plantar  Continue prevalon boots when in bed  Offload all wounds  Turn and reposition frequently, maximum of every two hours  Instruct / Assist with weight shifting every 15 - 20 minutes when in chair  Increase protein intake  Monitor for any sign of infection or worsening, inform PCP or patient's primary physician in your facility  Standing Status:   Future     Standing Expiration Date:   1/25/2023       Allergies  Latex and Other      Assessment & Plan:  1  Paraplegia following spinal cord injury [facility])  Assessment & Plan:  dependent with positioning when in bed    Orders:  -     Wound cleansing and dressings; Future    2  Deep tissue injury  Assessment & Plan:  Right plantar  -wound size almost the same as last week, 6 x 3 x 0 1 cm  ,purple nonblanchable  - local wound care with hydraguard  - ordered arterial duplex - pending result  - referred to podiatry    Orders:  -     Wound cleansing and dressings; Future    3  Open wound  Assessment & Plan:  Right lower leg  - 100% eschar, with no obvious sign of infection, stable  - local wound care - hydraguard  - follow up next week             Subjective: Followup for wound on the right plantar and right lower leg  Received patient in bed, asleep  Staff did not report any new wounds    No significant issues related to the wound  As per report, the arterial duplex result for the RLE is pending  Review of Systems   Reason unable to perform ROS: asleep  Objective: There were no vitals taken for this visit  Physical Exam  HENT:      Head: Normocephalic  Nose: Nose normal    Eyes:      Comments: asleep   Cardiovascular:      Rate and Rhythm: Normal rate  Pulmonary:      Effort: Pulmonary effort is normal    Abdominal:      Palpations: Abdomen is soft  Genitourinary:     Comments: With angel  Musculoskeletal:      Comments: paraplegia   Skin:     Findings: Lesion present  Comments: Right foot plantar - 6 x 3 0x 0 1 cm  , purple, non-blanchable, no obvious sign of infection    Right lower extremity - 6 5 x 1 2 x 0 1 cm , 100% eschar, 0% epithelial, no obvious sign of infection, no drainage   Neurological:      Mental Status: She is alert  Gait: Gait abnormal    Psychiatric:      Comments: asleep              Procedures           Patient's care was coordinated with nursing facility staff  Recent vitals, labs and updated medications were reviewed on EMR or chart system of facility   Past Medical, surgical, social, medication and allergy history and patient's previous records were reviewed and updated as appropriate:     Patient Active Problem List   Diagnosis    Neurogenic bladder    Chronic osteomyelitis ([address] )    Depression    DVT (deep venous thrombosis) ([address] )    Heart murmur    HTN (hypertension)    Hyperlipidemia    Hyponatremia    Pressure ulcer of contiguous region involving left buttock and hip, unstageable ([address] )    Kidney lesion, native, right    Neurogenic bowel    Paralysis ([address] )    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[address] )    Parkinson's disease ([address] )    Postoperative anemia due to acute blood loss    Seizure disorder ([address] )    Protein-calorie malnutrition ([address] )    Chronic right shoulder pain    CHF (congestive heart failure) ([address] )    [facility]    [facility] with R[facility] ([facility]    Chronic anticoagulation    Pressure injury of 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 ([facility])    Loose stools    Anemia due to chronic illness    Indigestion    Dehydration    Deep tissue injury    Open wound     Past Medical History:   Diagnosis Date    Back pain     Congestive heart failure ([facility])     Depressive disorder     Hypertension     Neurogenic bladder     Open wound of leg     Osteoarthritis     [facility]     Paraplegia [facility]     Seizure ([facility] )     Thrombosis     Ulcer of ankle ([facility] )     Urinary retention      Past Surgical History:   Procedure Laterality Date    CYSTOSCOPY      SKIN GRAFT       Social History     Socioeconomic History    Marital status: /Civil Union     Spouse name: None    Number of children: None    Years of education: None    Highest education level: None   Occupational History    None   Tobacco Use    Smoking status: Never Smoker    Smokeless tobacco: Never Used   Substance and Sexual Activity    Alcohol use: No    Drug use: No    Sexual activity: None   Other Topics Concern    None   Social History Narrative    None     Social Determinants of Health     Financial Resource Strain: Not on file   Food Insecurity: Not on file   Transportation Needs: Not on file   Physical Activity: Not on file   Stress: Not on file   Social Connections: Not on file   Intimate Partner Violence: Not on file   Housing Stability: Not on file        Current Outpatient Medications:     acetaminophen (TYLENOL) 325 mg tablet, Take 650 mg by mouth every 4 (four) hours as needed, Disp: , Rfl:     amLODIPine-benazepril (LOTREL) 10-20 MG per capsule, Take by mouth , Disp: , Rfl:     atorvastatin (LIPITOR) 20 mg tablet, Take 20 mg by mouth daily , Disp: , Rfl:     Biotin 10 MG CAPS, Take 10 mg by mouth daily, Disp: , Rfl:     bisacodyl (Dulcolax) 10 mg suppository, Insert 10 mg into the rectum daily, Disp: , Rfl:    carbidopa-levodopa (SINEMET)  mg per tablet, Take 1 tablet by mouth 4 (four) times a day, Disp: , Rfl:     Cranberry 450 MG TABS, Take 450 mg by mouth, Disp: , Rfl:     famotidine (PEPCID) 20 mg tablet, Take 20 mg by mouth 2 (two) times a day, Disp: , Rfl:     furosemide (LASIX) 20 mg tablet, Take 20 mg by mouth daily, Disp: , Rfl:     magnesium hydroxide (MILK OF MAGNESIA) 400 mg/5 mL oral suspension, Take by mouth daily as needed for constipation, Disp: , Rfl:     metoprolol tartrate (LOPRESSOR) 50 mg tablet, Take 50 mg by mouth 2 (two) times a day, Disp: , Rfl:     mirtazapine (REMERON) 15 mg tablet, Take 7 5 mg by mouth daily at bedtime, Disp: , Rfl:     Multiple Vitamin (DAILY VALUE MULTIVITAMIN) TABS, Take 1 tablet by mouth daily , Disp: , Rfl:     Multiple Vitamins-Minerals (HAIR SKIN NAILS PO), Take 3 tablets by mouth daily, Disp: , Rfl:     Nutritional Supplements (Micha) PACK, Take by mouth, Disp: , Rfl:     Omega-3 Fatty Acids (FISH OIL) 1,000 mg, Take 1,000 mg by mouth daily , Disp: , Rfl:     oxyCODONE (ROXICODONE) 5 mg immediate release tablet, Take 1 tablet (5 mg total) by mouth every 6 (six) hours as needed for moderate painMax Daily Amount: 20 mg, Disp: 15 tablet, Rfl: 0    phenytoin (DILANTIN) 100 mg ER capsule, Take 100 mg by mouth every 12 (twelve) hours , Disp: , Rfl:     potassium chloride (K-DUR,KLOR-CON) 10 mEq tablet, , Disp: , Rfl:     psyllium (Metamucil) 0 52 g capsule, Take 0 52 g by mouth daily, Disp: , Rfl:     saccharomyces boulardii (Florastor) 250 mg capsule, Take 250 mg by mouth 2 (two) times a day, Disp: , Rfl:     senna (SENOKOT) 8 6 MG tablet, Take 8 6 mg by mouth 2 (two) times a day, Disp: , Rfl:     sertraline (ZOLOFT) 25 mg tablet, Take 100 mg by mouth daily, Disp: , Rfl:     sodium phosphate-biphosphate (FLEET) 7-19 g 118 mL enema, Insert 1 enema into the rectum, Disp: , Rfl:     vitamin B-12 (CYANOCOBALAMIN) 250 MCG tablet, Take 500 mcg by mouth daily, Disp: , Rfl:     warfarin (COUMADIN) 6 mg tablet, Take 6 mg by mouth daily, Disp: , Rfl:   Family History   Problem Relation Age of Onset    Diabetes Father     Kidney cancer Father     Alzheimer's disease Mother     Bone cancer Paternal Uncle     Lung cancer Paternal Uncle               Coordination of Care: Supervisor aware of the treatment plan    Patient / Staff education : Staff was given education on sign of infection and pressure ulcer prevention  Staff verbalized understanding     Follow up :  Return in about 1 week (around 2/1/2022)  Voice-recognition software may have been used in the preparation of this document  Occasional wrong word or "sound-alike" substitutions may have occurred due to the inherent limitations of voice recognition software  Interpretation should be guided by context        SVETLANA Morgan

## 2022-02-01 ENCOUNTER — NURSING HOME VISIT (OUTPATIENT)
Dept: WOUND CARE | Facility: HOSPITAL | Age: 72
End: 2022-02-01
Payer: MEDICARE

## 2022-02-01 DIAGNOSIS — T14.8XXA OPEN WOUND: ICD-10-CM

## 2022-02-01 DIAGNOSIS — G82.20 PARAPLEGIA FOLLOWING SPINAL CORD INJURY (HCC): Primary | ICD-10-CM

## 2022-02-01 DIAGNOSIS — T14.8XXA DEEP TISSUE INJURY: ICD-10-CM

## 2022-02-01 PROCEDURE — 99308 SBSQ NF CARE LOW MDM 20: CPT | Performed by: NURSE PRACTITIONER

## 2022-02-01 NOTE — LETTER
Patient:  Felicia Saeed   1950           SVETLANA Stern saw Felicia Saeed for a wound care visit on 2/1/2022  See below for information relating to this visit  Chief Complaint   Patient presents with    Follow Up Wound Care Visit     Right foot plantar and right lower leg        Assessment/Plan:  1  Paraplegia following spinal cord injury [facility])  Assessment & Plan:  dependent with positioning when in bed    Orders:  -     Wound cleansing and dressings; Future    2  Deep tissue injury  Assessment & Plan:  Right plantar  -wound size decrease, 6 x 2 5 x 0 1 cm  , compared to last week measurement of 6 x 3 x 0 1 cm  ,purple nonblanchable  - local wound care with hydraguard  - ordered arterial duplex - pending result  - referred to podiatry - pending    Orders:  -     Wound cleansing and dressings; Future    3  Open wound  Assessment & Plan:  Right lower leg  - decrease wound size, 100% eschar, with no obvious sign of infection, stable  - local wound care - hydraguard  - follow up next week             Orders:  Felicia Saeed  1950  Orders Placed This Encounter   Procedures    Wound cleansing and dressings     Wound:  Right plantar and right lower leg  Discontinue previous wound order  Cleanse the wound bed with soap and water, pat dry  Apply hydraguard to wound bed  Frequency : twice a day and prn for soiling     Refer to podiatry, worsening of the wound on the right plantar  Continue prevalon boots when in bed  Offload all wounds  Turn and reposition frequently, maximum of every two hours  Instruct / Assist with weight shifting every 15 - 20 minutes when in chair  Increase protein intake  Monitor for any sign of infection or worsening, inform PCP or patient's primary physician in your facility  Standing Status:   Future     Standing Expiration Date:   2/1/2023         Follow Up:  Return in about 1 week (around 2/8/2022)         [address] hours are 8:00 am - 4:30 pm Monday through Friday  The center phone number is [phone]  You can also contact me directly thru my email at [facility]  [email or web address] or thru tiger text  If it is an emergency, please contact the PCP or patient's attending physician in your facility       Sincerely,    Electronically signed by SVETLANA Acuña    Patient : Farrukh Lizarraga    1950

## 2022-02-01 NOTE — ASSESSMENT & PLAN NOTE
Right lower leg  - decrease wound size, 100% eschar, with no obvious sign of infection, stable  - local wound care - hydraguard  - follow up next week

## 2022-02-01 NOTE — ASSESSMENT & PLAN NOTE
Right plantar  -wound size decrease, 6 x 2 5 x 0 1 cm  , compared to last week measurement of 6 x 3 x 0 1 cm  ,purple nonblanchable  - local wound care with hydraguard  - ordered arterial duplex - pending result  - referred to podiatry - pending

## 2022-02-01 NOTE — PROGRESS NOTES
[address] MANAGEMENT   AND HYPERBARIC MEDICINE CENTER       Patient ID: Eve Daniels is a 80 y o  female Date of Birth 1950     [facility]    Performed wound round with: supervisor      Chief Complaint   Patient presents with    Follow Up Wound Care Visit     Right foot plantar and right lower leg       Wound Instructions:  Orders Placed This Encounter   Procedures    Wound cleansing and dressings     Wound:  Right plantar and right lower leg  Discontinue previous wound order  Cleanse the wound bed with soap and water, pat dry  Apply hydraguard to wound bed  Frequency : twice a day and prn for soiling     Refer to podiatry, worsening of the wound on the right plantar  Continue prevalon boots when in bed  Offload all wounds  Turn and reposition frequently, maximum of every two hours  Instruct / Assist with weight shifting every 15 - 20 minutes when in chair  Increase protein intake  Monitor for any sign of infection or worsening, inform PCP or patient's primary physician in your facility  Standing Status:   Future     Standing Expiration Date:   2/1/2023       Allergies  Latex and Other      Assessment & Plan:  1  Paraplegia following spinal cord injury [facility])  Assessment & Plan:  dependent with positioning when in bed    Orders:  -     Wound cleansing and dressings; Future    2  Deep tissue injury  Assessment & Plan:  Right plantar  -wound size decrease, 6 x 2 5 x 0 1 cm  , compared to last week measurement of 6 x 3 x 0 1 cm  ,purple nonblanchable  - local wound care with hydraguard  - ordered arterial duplex - pending result  - referred to podiatry - pending    Orders:  -     Wound cleansing and dressings; Future    3  Open wound  Assessment & Plan:  Right lower leg  - decrease wound size, 100% eschar, with no obvious sign of infection, stable  - local wound care - hydraguard  - follow up next week             Subjective:    Followup for wound on the right plantar and right lower leg  Received patient in bed, asleep  Staff did not report any new wounds  No significant issues related to the wound  As per report, the arterial duplex result for the RLE is pending  Podiatry consult also pending  Review of Systems   Reason unable to perform ROS: asleep  Objective: There were no vitals taken for this visit  Physical Exam  HENT:      Head: Normocephalic  Nose: Nose normal    Eyes:      Comments: asleep   Cardiovascular:      Rate and Rhythm: Normal rate  Pulmonary:      Effort: Pulmonary effort is normal    Abdominal:      Palpations: Abdomen is soft  Genitourinary:     Comments: With angel  Musculoskeletal:      Comments: paraplegia   Skin:     Findings: Lesion present  Comments: Right foot plantar - 6 x 2 0x 0 1 cm  , purple, non-blanchable, no obvious sign of infection    Right lower extremity - 3 x 1 0 x 0 1 cm , 100% eschar, 0% epithelial, no obvious sign of infection, no drainage   Neurological:      Mental Status: She is alert  Gait: Gait abnormal    Psychiatric:      Comments: asleep              Procedures           Patient's care was coordinated with nursing facility staff  Recent vitals, labs and updated medications were reviewed on EMR or chart system of facility   Past Medical, surgical, social, medication and allergy history and patient's previous records were reviewed and updated as appropriate:     Patient Active Problem List   Diagnosis    Neurogenic bladder    Chronic osteomyelitis ([address] )    Depression    DVT (deep venous thrombosis) ([address] )    Heart murmur    HTN (hypertension)    Hyperlipidemia    Hyponatremia    Pressure ulcer of contiguous region involving left buttock and hip, unstageable ([address] )    Kidney lesion, native, right    Neurogenic bowel    Paralysis ([address] )    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[address] )    Parkinson's disease ([address] )    Postoperative anemia due to acute blood loss    Seizure disorder ([facility] )    Protein-calorie malnutrition ([facility] )    Chronic right shoulder pain    [facility]    Septic arthritis of hip ([facility]    Atrial fibrillation with RVR ([facility]    Chronic anticoagulation    Pressure injury of 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 ([facility]    Loose stools    Anemia due to chronic illness    Indigestion    Dehydration    Deep tissue injury    Open wound     Past Medical History:   Diagnosis Date    Back pain     [facility] failure [facility]     Depressive disorder     Hypertension     Neurogenic bladder     Open wound of leg     Osteoarthritis     Osteomyelitis ([place] [address] )     Paraplegia ([place] [address] )     Seizure ([place] [address] )     Thrombosis     Ulcer of ankle ([place] [address] )     Urinary retention      Past Surgical History:   Procedure Laterality Date    CYSTOSCOPY      SKIN GRAFT       Social History     Socioeconomic History    Marital status: /Civil Union     Spouse name: None    Number of children: None    Years of education: None    Highest education level: None   Occupational History    None   Tobacco Use    Smoking status: Never Smoker    Smokeless tobacco: Never Used   Substance and Sexual Activity    Alcohol use: No    Drug use: No    Sexual activity: None   Other Topics Concern    None   Social History Narrative    None     Social Determinants of Health     Financial Resource Strain: Not on file   Food Insecurity: Not on file   Transportation Needs: Not on file   Physical Activity: Not on file   Stress: Not on file   Social Connections: Not on file   Intimate Partner Violence: Not on file   Housing Stability: Not on file        Current Outpatient Medications:     acetaminophen (TYLENOL) 325 mg tablet, Take 650 mg by mouth every 4 (four) hours as needed, Disp: , Rfl:     amLODIPine-benazepril (LOTREL) 10-20 MG per capsule, Take by mouth , Disp: , Rfl:     atorvastatin (LIPITOR) 20 mg tablet, Take 20 mg by mouth daily , Disp: , Rfl:     Biotin 10 MG CAPS, Take 10 mg by mouth daily, Disp: , Rfl:     bisacodyl (Dulcolax) 10 mg suppository, Insert 10 mg into the rectum daily, Disp: , Rfl:     carbidopa-levodopa (SINEMET)  mg per tablet, Take 1 tablet by mouth 4 (four) times a day, Disp: , Rfl:     Cranberry 450 MG TABS, Take 450 mg by mouth, Disp: , Rfl:     famotidine (PEPCID) 20 mg tablet, Take 20 mg by mouth 2 (two) times a day, Disp: , Rfl:     furosemide (LASIX) 20 mg tablet, Take 20 mg by mouth daily, Disp: , Rfl:     magnesium hydroxide (MILK OF MAGNESIA) 400 mg/5 mL oral suspension, Take by mouth daily as needed for constipation, Disp: , Rfl:     metoprolol tartrate (LOPRESSOR) 50 mg tablet, Take 50 mg by mouth 2 (two) times a day, Disp: , Rfl:     mirtazapine (REMERON) 15 mg tablet, Take 7 5 mg by mouth daily at bedtime, Disp: , Rfl:     Multiple Vitamin (DAILY VALUE MULTIVITAMIN) TABS, Take 1 tablet by mouth daily , Disp: , Rfl:     Multiple Vitamins-Minerals (HAIR SKIN NAILS PO), Take 3 tablets by mouth daily, Disp: , Rfl:     Nutritional Supplements (Micha) PACK, Take by mouth, Disp: , Rfl:     Omega-3 Fatty Acids (FISH OIL) 1,000 mg, Take 1,000 mg by mouth daily , Disp: , Rfl:     oxyCODONE (ROXICODONE) 5 mg immediate release tablet, Take 1 tablet (5 mg total) by mouth every 6 (six) hours as needed for moderate painMax Daily Amount: 20 mg, Disp: 15 tablet, Rfl: 0    phenytoin (DILANTIN) 100 mg ER capsule, Take 100 mg by mouth every 12 (twelve) hours , Disp: , Rfl:     potassium chloride (K-DUR,KLOR-CON) 10 mEq tablet, , Disp: , Rfl:     psyllium (Metamucil) 0 52 g capsule, Take 0 52 g by mouth daily, Disp: , Rfl:     saccharomyces boulardii (Florastor) 250 mg capsule, Take 250 mg by mouth 2 (two) times a day, Disp: , Rfl:     senna (SENOKOT) 8 6 MG tablet, Take 8 6 mg by mouth 2 (two) times a day, Disp: , Rfl:     sertraline (ZOLOFT) 25 mg tablet, Take 100 mg by mouth daily, Disp: , Rfl:     sodium phosphate-biphosphate (FLEET) 7-19 g 118 mL enema, Insert 1 enema into the rectum, Disp: , Rfl:     vitamin B-12 (CYANOCOBALAMIN) 250 MCG tablet, Take 500 mcg by mouth daily, Disp: , Rfl:     warfarin (COUMADIN) 6 mg tablet, Take 6 mg by mouth daily, Disp: , Rfl:   Family History   Problem Relation Age of Onset    Diabetes Father     Kidney cancer Father     Alzheimer's disease Mother     Bone cancer Paternal Uncle     Lung cancer Paternal Uncle               Coordination of Care: Supervisor aware of the treatment plan    Patient / Staff education : Staff was given education on sign of infection and pressure ulcer prevention  Staff verbalized understanding     Follow up :  Return in about 1 week (around 2/8/2022)  Voice-recognition software may have been used in the preparation of this document  Occasional wrong word or "sound-alike" substitutions may have occurred due to the inherent limitations of voice recognition software  Interpretation should be guided by context        SVETLANA Canales

## 2022-02-01 NOTE — PATIENT INSTRUCTIONS
Orders Placed This Encounter   Procedures    Wound cleansing and dressings     Wound:  Right plantar and right lower leg  Discontinue previous wound order  Cleanse the wound bed with soap and water, pat dry  Apply hydraguard to wound bed  Frequency : twice a day and prn for soiling     Refer to podiatry, worsening of the wound on the right plantar  Continue prevalon boots when in bed  Offload all wounds  Turn and reposition frequently, maximum of every two hours  Instruct / Assist with weight shifting every 15 - 20 minutes when in chair  Increase protein intake  Monitor for any sign of infection or worsening, inform PCP or patient's primary physician in your facility       Standing Status:   Future     Standing Expiration Date:   2/1/2023

## 2022-02-08 ENCOUNTER — NURSING HOME VISIT (OUTPATIENT)
Dept: WOUND CARE | Facility: HOSPITAL | Age: 72
End: 2022-02-08
Payer: MEDICARE

## 2022-02-08 DIAGNOSIS — T14.8XXA OPEN WOUND: ICD-10-CM

## 2022-02-08 DIAGNOSIS — G82.20 PARAPLEGIA FOLLOWING SPINAL CORD INJURY (HCC): Primary | ICD-10-CM

## 2022-02-08 DIAGNOSIS — T14.8XXA DEEP TISSUE INJURY: ICD-10-CM

## 2022-02-08 PROCEDURE — 99308 SBSQ NF CARE LOW MDM 20: CPT | Performed by: NURSE PRACTITIONER

## 2022-02-08 NOTE — PATIENT INSTRUCTIONS
Orders Placed This Encounter   Procedures    Wound cleansing and dressings     Wound:  Right plantar and right lower leg  Discontinue previous wound order  Cleanse the wound bed with soap and water, pat dry  Apply hydraguard to wound bed  Frequency : twice a day and prn for soiling     Continue prevalon boots when in bed  Offload all wounds  Turn and reposition frequently, maximum of every two hours  Instruct / Assist with weight shifting every 15 - 20 minutes when in chair  Increase protein intake    Monitor for any sign of infection or worsening, inform PCP or patient's primary physician in your facility     Standing Status:   Future     Standing Expiration Date:   2/8/2023

## 2022-02-08 NOTE — PROGRESS NOTES
[address] MANAGEMENT   AND [facility]INE [facility]ENTER       Patient ID: Sumi Marrero is a 80 y o  female Date of Birth 1950     [facility]    Performed wound round with: supervisor      Chief Complaint   Patient presents with    Follow Up Wound Care Visit     Right lower leg and right plantar       Wound Instructions:  Orders Placed This Encounter   Procedures    Wound cleansing and dressings     Wound:  Right plantar and right lower leg  Discontinue previous wound order  Cleanse the wound bed with soap and water, pat dry  Apply hydraguard to wound bed  Frequency : twice a day and prn for soiling     Continue prevalon boots when in bed  Offload all wounds  Turn and reposition frequently, maximum of every two hours  Instruct / Assist with weight shifting every 15 - 20 minutes when in chair  Increase protein intake  Monitor for any sign of infection or worsening, inform PCP or patient's primary physician in your facility     Standing Status:   Future     Standing Expiration Date:   2/8/2023       Allergies  Latex and Other      Assessment & Plan:  1  Paraplegia following spinal cord injury [facility])  Assessment & Plan:  dependent with positioning when in bed    Orders:  -     Wound cleansing and dressings; Future    2  Deep tissue injury  Assessment & Plan:  Right plantar  -wound size decrease, 5 5 x 2 5 x 0 1 cm  , compared to last visit measurement of 6 x 2 5 x 0 1 cm  , wound bed improved, 50% epithelial, 50% purple  - local wound care with hydraguard  - ordered arterial duplex - pending result  - referred to podiatry - pending    Orders:  -     Wound cleansing and dressings; Future    3  Open wound  Assessment & Plan:  Right lower leg  - healed  - continue hydraguard                Subjective: Followup for wound on the right plantar and right lower leg  Received patient in bed, asleep  Staff did not report any new wounds    No significant issues related to the wound  As per report, the arterial duplex result for the RLE is pending  Review of Systems   Reason unable to perform ROS: asleep  Objective: There were no vitals taken for this visit  Physical Exam  HENT:      Head: Normocephalic  Nose: Nose normal    Eyes:      Comments: asleep   Cardiovascular:      Rate and Rhythm: Normal rate  Pulmonary:      Effort: Pulmonary effort is normal    Abdominal:      Palpations: Abdomen is soft  Genitourinary:     Comments: With angel  Musculoskeletal:      Comments: paraplegia   Skin:     Findings: Lesion present  Comments: Right foot plantar - 5 5 x 2 5 x 0 1 cm , 50% purple, 50% epithelial, no drainage, no obvious sign of infection, periwound is dry, denies pain    Right lower extremity - healed   Neurological:      Mental Status: She is alert  Gait: Gait abnormal    Psychiatric:      Comments: asleep              Procedures           Patient's care was coordinated with nursing facility staff  Recent vitals, labs and updated medications were reviewed on EMR or chart system of facility   Past Medical, surgical, social, medication and allergy history and patient's previous records were reviewed and updated as appropriate:     Patient Active Problem List   Diagnosis    Neurogenic bladder    [facility]    Depression    DVT (deep venous thrombosis) [facility]    Heart murmur    HTN (hypertension)    Hyperlipidemia    Hyponatremia    Pressure ulcer of contiguous region involving left buttock and hip, unstageable ([address] )    Kidney lesion, native, right    Neurogenic bowel    Paralysis ([facility]    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[address] )    Parkinson's disease ([facility] [facility]    Postoperative anemia due to acute blood loss    Seizure disorder ([address] )    Protein-calorie malnutrition ([address] )    Chronic right shoulder pain    CHF (congestive heart failure) ([address] )    Septic arthritis of hip ([address] )    Atrial fibrillation with RVR ([facility] [facility]    Chronic anticoagulation    [facility] 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 [facility]    Loose stools    Anemia due to chronic illness    Indigestion    Dehydration    Deep tissue injury    Open wound     Past Medical History:   Diagnosis Date    Back pain     Congestive heart failure ([facility])     Depressive disorder     Hypertension     Neurogenic bladder     Open wound of leg     Osteoarthritis     Osteomyelitis ([address] )     Paraplegia ([address] )     Seizure ([address] )     Thrombosis     [facility]     Urinary retention      Past Surgical History:   Procedure Laterality Date    CYSTOSCOPY      SKIN GRAFT       Social History     Socioeconomic History    Marital status: /Civil Union     Spouse name: None    Number of children: None    Years of education: None    Highest education level: None   Occupational History    None   Tobacco Use    Smoking status: Never Smoker    Smokeless tobacco: Never Used   Substance and Sexual Activity    Alcohol use: No    Drug use: No    Sexual activity: None   Other Topics Concern    None   Social History Narrative    None     Social Determinants of Health     Financial Resource Strain: Not on file   Food Insecurity: Not on file   Transportation Needs: Not on file   Physical Activity: Not on file   Stress: Not on file   Social Connections: Not on file   Intimate Partner Violence: Not on file   Housing Stability: Not on file        Current Outpatient Medications:     acetaminophen (TYLENOL) 325 mg tablet, Take 650 mg by mouth every 4 (four) hours as needed, Disp: , Rfl:     amLODIPine-benazepril (LOTREL) 10-20 MG per capsule, Take by mouth , Disp: , Rfl:     atorvastatin (LIPITOR) 20 mg tablet, Take 20 mg by mouth daily , Disp: , Rfl:     Biotin 10 MG CAPS, Take 10 mg by mouth daily, Disp: , Rfl:     bisacodyl (Dulcolax) 10 mg suppository, Insert 10 mg into the rectum daily, Disp: , Rfl:     carbidopa-levodopa (SINEMET)  mg per tablet, Take 1 tablet by mouth 4 (four) times a day, Disp: , Rfl:     Cranberry 450 MG TABS, Take 450 mg by mouth, Disp: , Rfl:     famotidine (PEPCID) 20 mg tablet, Take 20 mg by mouth 2 (two) times a day, Disp: , Rfl:     furosemide (LASIX) 20 mg tablet, Take 20 mg by mouth daily, Disp: , Rfl:     magnesium hydroxide (MILK OF MAGNESIA) 400 mg/5 mL oral suspension, Take by mouth daily as needed for constipation, Disp: , Rfl:     metoprolol tartrate (LOPRESSOR) 50 mg tablet, Take 50 mg by mouth 2 (two) times a day, Disp: , Rfl:     mirtazapine (REMERON) 15 mg tablet, Take 7 5 mg by mouth daily at bedtime, Disp: , Rfl:     Multiple Vitamin (DAILY VALUE MULTIVITAMIN) TABS, Take 1 tablet by mouth daily , Disp: , Rfl:     Multiple Vitamins-Minerals (HAIR SKIN NAILS PO), Take 3 tablets by mouth daily, Disp: , Rfl:     Nutritional Supplements (Micha) PACK, Take by mouth, Disp: , Rfl:     Omega-3 Fatty Acids (FISH OIL) 1,000 mg, Take 1,000 mg by mouth daily , Disp: , Rfl:     oxyCODONE (ROXICODONE) 5 mg immediate release tablet, Take 1 tablet (5 mg total) by mouth every 6 (six) hours as needed for moderate painMax Daily Amount: 20 mg, Disp: 15 tablet, Rfl: 0    phenytoin (DILANTIN) 100 mg ER capsule, Take 100 mg by mouth every 12 (twelve) hours , Disp: , Rfl:     potassium chloride (K-DUR,KLOR-CON) 10 mEq tablet, , Disp: , Rfl:     psyllium (Metamucil) 0 52 g capsule, Take 0 52 g by mouth daily, Disp: , Rfl:     saccharomyces boulardii (Florastor) 250 mg capsule, Take 250 mg by mouth 2 (two) times a day, Disp: , Rfl:     senna (SENOKOT) 8 6 MG tablet, Take 8 6 mg by mouth 2 (two) times a day, Disp: , Rfl:     sertraline (ZOLOFT) 25 mg tablet, Take 100 mg by mouth daily, Disp: , Rfl:     sodium phosphate-biphosphate (FLEET) 7-19 g 118 mL enema, Insert 1 enema into the rectum, Disp: , Rfl:     vitamin B-12 (CYANOCOBALAMIN) 250 MCG tablet, Take 500 mcg by mouth daily, Disp: , Rfl:     warfarin (COUMADIN) 6 mg tablet, Take 6 mg by mouth daily, Disp: , Rfl:   Family History   Problem Relation Age of Onset    Diabetes Father     Kidney cancer Father     Alzheimer's disease Mother     Bone cancer Paternal Uncle     Lung cancer Paternal Uncle               Coordination of Care: Supervisor aware of the treatment plan    Patient / Staff education : Staff was given education on sign of infection and pressure ulcer prevention  Staff verbalized understanding     Follow up :  Return in about 1 week (around 2/15/2022)  Voice-recognition software may have been used in the preparation of this document  Occasional wrong word or "sound-alike" substitutions may have occurred due to the inherent limitations of voice recognition software  Interpretation should be guided by context        SVETLANA Patel

## 2022-02-08 NOTE — ASSESSMENT & PLAN NOTE
Right plantar  -wound size decrease, 5 5 x 2 5 x 0 1 cm  , compared to last visit measurement of 6 x 2 5 x 0 1 cm  , wound bed improved, 50% epithelial, 50% purple  - local wound care with hydraguard  - ordered arterial duplex - pending result  - referred to podiatry - pending

## 2022-02-15 ENCOUNTER — NURSING HOME VISIT (OUTPATIENT)
Dept: WOUND CARE | Facility: HOSPITAL | Age: 72
End: 2022-02-15
Payer: MEDICARE

## 2022-02-15 DIAGNOSIS — T14.8XXA DEEP TISSUE INJURY: Primary | ICD-10-CM

## 2022-02-15 PROCEDURE — 99308 SBSQ NF CARE LOW MDM 20: CPT | Performed by: NURSE PRACTITIONER

## 2022-02-15 NOTE — PATIENT INSTRUCTIONS
Orders Placed This Encounter   Procedures    Wound cleansing and dressings     Wound:  Right plantar   Discontinue previous wound order  Cleanse the wound bed with soap and water, pat dry  Apply hydraguard to wound bed  Frequency : twice a day and prn for soiling     Continue prevalon boots when in bed  Offload all wounds  Turn and reposition frequently, maximum of every two hours  Instruct / Assist with weight shifting every 15 - 20 minutes when in chair  Increase protein intake    Monitor for any sign of infection or worsening, inform PCP or patient's primary physician in your facility     Standing Status:   Future     Standing Expiration Date:   2/15/2023

## 2022-02-15 NOTE — PROGRESS NOTES
[address] MANAGEMENT   AND [facility]PER[facility] MED[facility]INE [facility]ENTER       Patient ID: Farrukh Lizarraga is a 80 y o  female Date of Birth 1950     Location of Service: [facility]     Performed wound round with: supervisor      Chief Complaint   Patient presents with    Follow Up Wound Care Visit     right plantar       Wound Instructions:  Orders Placed This Encounter   Procedures    Wound cleansing and dressings     Wound:  Right plantar   Discontinue previous wound order  Cleanse the wound bed with soap and water, pat dry  Apply hydraguard to wound bed  Frequency : twice a day and prn for soiling     Continue prevalon boots when in bed  Offload all wounds  Turn and reposition frequently, maximum of every two hours  Instruct / Assist with weight shifting every 15 - 20 minutes when in chair  Increase protein intake  Monitor for any sign of infection or worsening, inform PCP or patient's primary physician in your facility     Standing Status:   Future     Standing Expiration Date:   2/15/2023       Allergies  Latex and Other      Assessment & Plan:  1  Deep tissue injury  Assessment & Plan:  Right plantar  -wound size increase, but wound bed improved, the purple area is superficial, dry skin  - local wound care with hydraguard  - ordered arterial duplex - pending result  - referred to podiatry - pending    Orders:  -     Wound cleansing and dressings; Future           Subjective: Followup for wound on the right plantar  Received patient in bed, asleep  Staff did not report any new wounds  No significant issues related to the wound  Review of Systems   Reason unable to perform ROS: asleep  Objective: There were no vitals taken for this visit  Physical Exam  HENT:      Head: Normocephalic  Nose: Nose normal    Eyes:      Comments: asleep   Cardiovascular:      Rate and Rhythm: Normal rate     Pulmonary:      Effort: Pulmonary effort is normal    Abdominal:      Palpations: Abdomen is soft    Genitourinary:     Comments: With angel  Musculoskeletal:      Comments: paraplegia   Skin:     Findings: Lesion present  Comments: Right foot plantar - 6 x 2 0x 0 1 cm , 50% purple, 50% epithelial, no drainage, no obvious sign of infection, periwound is dry, denies pain    Right lower extremity - healed   Neurological:      Mental Status: She is alert  Gait: Gait abnormal    Psychiatric:      Comments: asleep              Procedures           Patient's care was coordinated with nursing facility staff  Recent vitals, labs and updated medications were reviewed on EMR or chart system of facility   Past Medical, surgical, social, medication and allergy history and patient's previous records were reviewed and updated as appropriate:     Patient Active Problem List   Diagnosis    Neurogenic bladder    [facility]omy[facility] [facility]    Depression    [facility]    Heart murmur    HTN (hypertension)    Hyperlipidemia    Hyponatremia    Pressure ulcer of contiguous region involving left buttock and hip, unstageable ([facility] )    Kidney lesion, native, right    Neurogenic bowel    Paralysis [facility] [facility]    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[facility] [facility]    Parkinson's disease [facility] [facility]    Postoperative anemia due to acute blood loss    Seizure disorder ([facility] [facility] )    Protein-calorie malnutrition ([facility] [facility]    Chronic right shoulder pain    CHF (congestive heart failure) ([facility] [facility]    Septic arthritis of hip ([facility] [facility]    Atrial fibrillation with RVR ([facility] [facility]    Chronic anticoagulation    Pressure injury of 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 ([facility])    Loose stools    Anemia due to chronic illness    Indigestion    Dehydration    Deep tissue injury    Open wound     Past Medical History:   Diagnosis Date    Back pain     Congestive heart failure ([facility])     Depressive disorder     Hypertension     Neurogenic bladder     Open wound of leg     Osteoarthritis     Osteomyelitis ([facility] )     Paraplegia [facility] [facility]     Seizure ([facility]     Thrombosis     Ulcer of ankle ([address] )     Urinary retention      Past Surgical History:   Procedure Laterality Date    CYSTOSCOPY      SKIN GRAFT       Social History     Socioeconomic History    Marital status: /Civil Union     Spouse name: None    Number of children: None    Years of education: None    Highest education level: None   Occupational History    None   Tobacco Use    Smoking status: Never Smoker    Smokeless tobacco: Never Used   Substance and Sexual Activity    Alcohol use: No    Drug use: No    Sexual activity: None   Other Topics Concern    None   Social History Narrative    None     Social Determinants of Health     Financial Resource Strain: Not on file   Food Insecurity: Not on file   Transportation Needs: Not on file   Physical Activity: Not on file   Stress: Not on file   Social Connections: Not on file   Intimate Partner Violence: Not on file   Housing Stability: Not on file        Current Outpatient Medications:     acetaminophen (TYLENOL) 325 mg tablet, Take 650 mg by mouth every 4 (four) hours as needed, Disp: , Rfl:     amLODIPine-benazepril (LOTREL) 10-20 MG per capsule, Take by mouth , Disp: , Rfl:     atorvastatin (LIPITOR) 20 mg tablet, Take 20 mg by mouth daily , Disp: , Rfl:     Biotin 10 MG CAPS, Take 10 mg by mouth daily, Disp: , Rfl:     bisacodyl (Dulcolax) 10 mg suppository, Insert 10 mg into the rectum daily, Disp: , Rfl:     carbidopa-levodopa (SINEMET)  mg per tablet, Take 1 tablet by mouth 4 (four) times a day, Disp: , Rfl:     Cranberry 450 MG TABS, Take 450 mg by mouth, Disp: , Rfl:     famotidine (PEPCID) 20 mg tablet, Take 20 mg by mouth 2 (two) times a day, Disp: , Rfl:     furosemide (LASIX) 20 mg tablet, Take 20 mg by mouth daily, Disp: , Rfl:     magnesium hydroxide (MILK OF MAGNESIA) 400 mg/5 mL oral suspension, Take by mouth daily as needed for constipation, Disp: , Rfl:     metoprolol tartrate (LOPRESSOR) 50 mg tablet, Take 50 mg by mouth 2 (two) times a day, Disp: , Rfl:     mirtazapine (REMERON) 15 mg tablet, Take 7 5 mg by mouth daily at bedtime, Disp: , Rfl:     Multiple Vitamin (DAILY VALUE MULTIVITAMIN) TABS, Take 1 tablet by mouth daily , Disp: , Rfl:     Multiple Vitamins-Minerals (HAIR SKIN NAILS PO), Take 3 tablets by mouth daily, Disp: , Rfl:     Nutritional Supplements (Micha) PACK, Take by mouth, Disp: , Rfl:     Omega-3 Fatty Acids (FISH OIL) 1,000 mg, Take 1,000 mg by mouth daily , Disp: , Rfl:     oxyCODONE (ROXICODONE) 5 mg immediate release tablet, Take 1 tablet (5 mg total) by mouth every 6 (six) hours as needed for moderate painMax Daily Amount: 20 mg, Disp: 15 tablet, Rfl: 0    phenytoin (DILANTIN) 100 mg ER capsule, Take 100 mg by mouth every 12 (twelve) hours , Disp: , Rfl:     potassium chloride (K-DUR,KLOR-CON) 10 mEq tablet, , Disp: , Rfl:     psyllium (Metamucil) 0 52 g capsule, Take 0 52 g by mouth daily, Disp: , Rfl:     saccharomyces boulardii (Florastor) 250 mg capsule, Take 250 mg by mouth 2 (two) times a day, Disp: , Rfl:     senna (SENOKOT) 8 6 MG tablet, Take 8 6 mg by mouth 2 (two) times a day, Disp: , Rfl:     sertraline (ZOLOFT) 25 mg tablet, Take 100 mg by mouth daily, Disp: , Rfl:     sodium phosphate-biphosphate (FLEET) 7-19 g 118 mL enema, Insert 1 enema into the rectum, Disp: , Rfl:     vitamin B-12 (CYANOCOBALAMIN) 250 MCG tablet, Take 500 mcg by mouth daily, Disp: , Rfl:     warfarin (COUMADIN) 6 mg tablet, Take 6 mg by mouth daily, Disp: , Rfl:   Family History   Problem Relation Age of Onset    Diabetes Father     Kidney cancer Father     Alzheimer's disease Mother     Bone cancer Paternal Uncle     Lung cancer Paternal Uncle               Coordination of Care: Supervisor aware of the treatment plan    Patient / Staff education : Staff was given education on sign of infection and pressure ulcer prevention    Staff verbalized understanding     Follow up :  Return in about 1 week (around 2/22/2022)  Voice-recognition software may have been used in the preparation of this document  Occasional wrong word or "sound-alike" substitutions may have occurred due to the inherent limitations of voice recognition software  Interpretation should be guided by context        SVETLANA Quintanilla

## 2022-02-15 NOTE — ASSESSMENT & PLAN NOTE
Right plantar  -wound size increase, but wound bed improved, the purple area is superficial, dry skin  - local wound care with hydraguard  - ordered arterial duplex - pending result  - referred to podiatry - pending

## 2022-02-22 ENCOUNTER — NURSING HOME VISIT (OUTPATIENT)
Dept: WOUND CARE | Facility: HOSPITAL | Age: 72
End: 2022-02-22
Payer: MEDICARE

## 2022-02-22 DIAGNOSIS — G82.20 PARAPLEGIA FOLLOWING SPINAL CORD INJURY (HCC): Primary | ICD-10-CM

## 2022-02-22 DIAGNOSIS — T14.8XXA DEEP TISSUE INJURY: ICD-10-CM

## 2022-02-22 PROCEDURE — 99308 SBSQ NF CARE LOW MDM 20: CPT | Performed by: NURSE PRACTITIONER

## 2022-02-22 NOTE — ASSESSMENT & PLAN NOTE
Right plantar  -wound - stable, , the purple area is superficial, dry skin  - local wound care with hydraguard  - ordered arterial duplex - pending result  - referred to podiatry - pending

## 2022-02-22 NOTE — PATIENT INSTRUCTIONS
Orders Placed This Encounter   Procedures    Wound cleansing and dressings     Wound:  Right plantar   Discontinue previous wound order  Cleanse the wound bed with soap and water, pat dry  Apply hydraguard to wound bed  Frequency : twice a day and prn for soiling     Continue prevalon boots when in bed  Offload all wounds  Turn and reposition frequently, maximum of every two hours  Instruct / Assist with weight shifting every 15 - 20 minutes when in chair  Increase protein intake    Monitor for any sign of infection or worsening, inform PCP or patient's primary physician in your facility     Standing Status:   Future     Standing Expiration Date:   2/22/2023

## 2022-02-22 NOTE — PROGRESS NOTES
[address] MANAGEMENT   AND HYPERBARIC MEDICINE CENTER       Patient ID: Geovanna Glynn is a 80 y o  female Date of Birth 1950     [facility]    Performed wound round with: supervisor      Chief Complaint   Patient presents with    Follow Up Wound Care Visit     Right foot plantar       Wound Instructions:  Orders Placed This Encounter   Procedures    Wound cleansing and dressings     Wound:  Right plantar   Discontinue previous wound order  Cleanse the wound bed with soap and water, pat dry  Apply hydraguard to wound bed  Frequency : twice a day and prn for soiling     Continue prevalon boots when in bed  Offload all wounds  Turn and reposition frequently, maximum of every two hours  Instruct / Assist with weight shifting every 15 - 20 minutes when in chair  Increase protein intake  Monitor for any sign of infection or worsening, inform PCP or patient's primary physician in your facility     Standing Status:   Future     Standing Expiration Date:   2/22/2023       Allergies  Latex and Other      Assessment & Plan:  1  Paraplegia following spinal cord injury [facility])  Assessment & Plan:  dependent with positioning when in bed      2  Deep tissue injury  Assessment & Plan:  Right plantar  -wound - stable, , the purple area is superficial, dry skin  - local wound care with hydraguard  - ordered arterial duplex - pending result  - referred to podiatry - pending    Orders:  -     Wound cleansing and dressings; Future           Subjective: Followup for wound on the right plantar  Received patient in bed, asleep  Staff did not report any new wounds  No significant issues related to the wound  Patient is using prevalon boot  Review of Systems   Reason unable to perform ROS: asleep  Objective: There were no vitals taken for this visit  Physical Exam  HENT:      Head: Normocephalic        Nose: Nose normal    Eyes:      Comments: asleep   Pulmonary: Effort: Pulmonary effort is normal    Abdominal:      Palpations: Abdomen is soft  Genitourinary:     Comments: With angel  Musculoskeletal:      Comments: paraplegia   Skin:     Findings: Lesion present  Comments: Right foot plantar - 5 x 3 x 0 1 cm, purple, non blanchable, no drainage, no obvious sign of infection, periwound is dry, denies pain    Right lower extremity - healed   Neurological:      Mental Status: She is alert  Gait: Gait abnormal    Psychiatric:      Comments: asleep              Procedures           Patient's care was coordinated with nursing facility staff  Recent vitals, labs and updated medications were reviewed on EMR or chart system of facility   Past Medical, surgical, social, medication and allergy history and patient's previous records were reviewed and updated as appropriate:     Patient Active Problem List   Diagnosis    Neurogenic bladder    [facility] osteomyelitis [facility] )    Depression    D[facility] venous thrombosis) [facility]    Heart murmur    HTN (hypertension)    Hyperlipidemia    Hyponatremia    Pressure ulcer of contiguous region involving left buttock and hip, unstageable ([facility] [address] )    Kidney lesion, native, right    Neurogenic bowel    Paralysis ([facility] [facility] )    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[facility] [address] )    Parkinson's disease ([facility] [facility] )    Postoperative anemia due to acute blood loss    Seizure disorder ([facility] )    Protein-calorie malnutrition ([facility] [address] )    Chronic right shoulder pain    CHF (congestive heart failure) ([facility] [facility] )    Septic arthritis of hip ([facility] [address] )    Atrial fibrillation with RVR ([facility] [address] )    Chronic anticoagulation    Pressure injury of 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 ([facility])    Loose stools    Anemia due to chronic illness    Indigestion    Dehydration    Deep tissue injury    Open wound     Past Medical History:   Diagnosis Date    Back pain     Congestive heart failure ([facility])     Depressive disorder     Hypertension     Neurogenic bladder     Open wound of leg     Osteoarthritis     Osteomyelitis [facility] [address] [facility]     Paraplegia ([facility] )     [facility]     Thrombosis     [facility]     Urinary retention      Past Surgical History:   Procedure Laterality Date    CYSTOSCOPY      SKIN GRAFT       Social History     Socioeconomic History    Marital status: /Civil Union     Spouse name: None    Number of children: None    Years of education: None    Highest education level: None   Occupational History    None   Tobacco Use    Smoking status: Never Smoker    Smokeless tobacco: Never Used   Substance and Sexual Activity    Alcohol use: No    Drug use: No    Sexual activity: None   Other Topics Concern    None   Social History Narrative    None     Social Determinants of Health     Financial Resource Strain: Not on file   Food Insecurity: Not on file   Transportation Needs: Not on file   Physical Activity: Not on file   Stress: Not on file   Social Connections: Not on file   Intimate Partner Violence: Not on file   Housing Stability: Not on file        Current Outpatient Medications:     acetaminophen (TYLENOL) 325 mg tablet, Take 650 mg by mouth every 4 (four) hours as needed, Disp: , Rfl:     amLODIPine-benazepril (LOTREL) 10-20 MG per capsule, Take by mouth , Disp: , Rfl:     atorvastatin (LIPITOR) 20 mg tablet, Take 20 mg by mouth daily , Disp: , Rfl:     Biotin 10 MG CAPS, Take 10 mg by mouth daily, Disp: , Rfl:     bisacodyl (Dulcolax) 10 mg suppository, Insert 10 mg into the rectum daily, Disp: , Rfl:     carbidopa-levodopa (SINEMET)  mg per tablet, Take 1 tablet by mouth 4 (four) times a day, Disp: , Rfl:     Cranberry 450 MG TABS, Take 450 mg by mouth, Disp: , Rfl:     famotidine (PEPCID) 20 mg tablet, Take 20 mg by mouth 2 (two) times a day, Disp: , Rfl:     furosemide (LASIX) 20 mg tablet, Take 20 mg by mouth daily, Disp: , Rfl:     magnesium hydroxide (MILK OF MAGNESIA) 400 mg/5 mL oral suspension, Take by mouth daily as needed for constipation, Disp: , Rfl:     metoprolol tartrate (LOPRESSOR) 50 mg tablet, Take 50 mg by mouth 2 (two) times a day, Disp: , Rfl:     mirtazapine (REMERON) 15 mg tablet, Take 7 5 mg by mouth daily at bedtime, Disp: , Rfl:     Multiple Vitamin (DAILY VALUE MULTIVITAMIN) TABS, Take 1 tablet by mouth daily , Disp: , Rfl:     Multiple Vitamins-Minerals (HAIR SKIN NAILS PO), Take 3 tablets by mouth daily, Disp: , Rfl:     Nutritional Supplements (Micha) PACK, Take by mouth, Disp: , Rfl:     Omega-3 Fatty Acids (FISH OIL) 1,000 mg, Take 1,000 mg by mouth daily , Disp: , Rfl:     oxyCODONE (ROXICODONE) 5 mg immediate release tablet, Take 1 tablet (5 mg total) by mouth every 6 (six) hours as needed for moderate painMax Daily Amount: 20 mg, Disp: 15 tablet, Rfl: 0    phenytoin (DILANTIN) 100 mg ER capsule, Take 100 mg by mouth every 12 (twelve) hours , Disp: , Rfl:     potassium chloride (K-DUR,KLOR-CON) 10 mEq tablet, , Disp: , Rfl:     psyllium (Metamucil) 0 52 g capsule, Take 0 52 g by mouth daily, Disp: , Rfl:     saccharomyces boulardii (Florastor) 250 mg capsule, Take 250 mg by mouth 2 (two) times a day, Disp: , Rfl:     senna (SENOKOT) 8 6 MG tablet, Take 8 6 mg by mouth 2 (two) times a day, Disp: , Rfl:     sertraline (ZOLOFT) 25 mg tablet, Take 100 mg by mouth daily, Disp: , Rfl:     sodium phosphate-biphosphate (FLEET) 7-19 g 118 mL enema, Insert 1 enema into the rectum, Disp: , Rfl:     vitamin B-12 (CYANOCOBALAMIN) 250 MCG tablet, Take 500 mcg by mouth daily, Disp: , Rfl:     warfarin (COUMADIN) 6 mg tablet, Take 6 mg by mouth daily, Disp: , Rfl:   Family History   Problem Relation Age of Onset    Diabetes Father     Kidney cancer Father     Alzheimer's disease Mother     Bone cancer Paternal Uncle     Lung cancer Paternal Uncle               Coordination of Care: Supervisor aware of the treatment plan    Patient / Staff education : Staff was given education on sign of infection and pressure ulcer prevention  Staff verbalized understanding     Follow up :  Return in about 1 week (around 3/1/2022)  Voice-recognition software may have been used in the preparation of this document  Occasional wrong word or "sound-alike" substitutions may have occurred due to the inherent limitations of voice recognition software  Interpretation should be guided by context        SVETLANA Myers

## 2022-03-01 ENCOUNTER — NURSING HOME VISIT (OUTPATIENT)
Dept: WOUND CARE | Facility: HOSPITAL | Age: 72
End: 2022-03-01
Payer: MEDICARE

## 2022-03-01 DIAGNOSIS — T14.8XXA DEEP TISSUE INJURY: ICD-10-CM

## 2022-03-01 DIAGNOSIS — G82.20 PARAPLEGIA FOLLOWING SPINAL CORD INJURY (HCC): Primary | ICD-10-CM

## 2022-03-01 PROCEDURE — 99308 SBSQ NF CARE LOW MDM 20: CPT | Performed by: NURSE PRACTITIONER

## 2022-03-01 NOTE — PATIENT INSTRUCTIONS
Orders Placed This Encounter   Procedures    Wound cleansing and dressings     Wound:  Right plantar   Discontinue previous wound order  Cleanse the wound bed with soap and water, pat dry  Apply hydraguard to wound bed  Frequency : twice a day and prn for soiling     Refer to inhouse podiatry  Continue prevalon boots when in bed  Offload all wounds  Turn and reposition frequently, maximum of every two hours  Instruct / Assist with weight shifting every 15 - 20 minutes when in chair  Increase protein intake    Monitor for any sign of infection or worsening, inform PCP or patient's primary physician in your facility     Standing Status:   Future     Standing Expiration Date:   3/1/2023

## 2022-03-01 NOTE — ASSESSMENT & PLAN NOTE
Right plantar  -wound - stable, , wound size - 4 x 2 4  Cm,  the purple area is superficial, dry skin  - local wound care with hydraguard  - ordered arterial duplex - pending result  - referred to podiatry - pending

## 2022-03-01 NOTE — PROGRESS NOTES
[address] MANAGEMENT   AND HYPERBARIC MEDICINE CENTER       Patient ID: Melo Summers is a 80 y o  female Date of Birth 1950     Location of Service: [place] [address]    Performed wound round with: supervisor      Chief Complaint   Patient presents with    Follow Up Wound Care Visit     Right plantar       Wound Instructions:  Orders Placed This Encounter   Procedures    Wound cleansing and dressings     Wound:  Right plantar   Discontinue previous wound order  Cleanse the wound bed with soap and water, pat dry  Apply hydraguard to wound bed  Frequency : twice a day and prn for soiling     Refer to inhouse podiatry  Continue prevalon boots when in bed  Offload all wounds  Turn and reposition frequently, maximum of every two hours  Instruct / Assist with weight shifting every 15 - 20 minutes when in chair  Increase protein intake  Monitor for any sign of infection or worsening, inform PCP or patient's primary physician in your facility     Standing Status:   Future     Standing Expiration Date:   3/1/2023       Allergies  Latex and Other      Assessment & Plan:  1  Paraplegia following spinal cord injury [facility])  Assessment & Plan:  dependent with positioning when in bed    Orders:  -     Wound cleansing and dressings; Future    2  Deep tissue injury  Assessment & Plan:  Right plantar  -wound - stable, , wound size - 4 x 2 4  Cm,  the purple area is superficial, dry skin  - local wound care with hydraguard  - ordered arterial duplex - pending result  - referred to podiatry - pending    Orders:  -     Wound cleansing and dressings; Future           Subjective: Followup for wound on the right plantar  Received patient in bed, asleep  Staff did not report any new wounds  No significant issues related to the wound  DON was made aware of the pending arterial duplex result and podiatry referral       Review of Systems   Reason unable to perform ROS: asleep  Objective:   There were no vitals taken for this visit  Physical Exam  HENT:      Head: Normocephalic  Nose: Nose normal    Eyes:      Comments: asleep   Pulmonary:      Effort: Pulmonary effort is normal    Abdominal:      Palpations: Abdomen is soft  Genitourinary:     Comments: With angel  Musculoskeletal:      Comments: paraplegia   Skin:     Findings: Lesion present  Comments: Right foot plantar - 4 x 2 4 x 0 1 cm, purple, non blanchable, no drainage, no obvious sign of infection, periwound is dry, denies pain    Right lower extremity - healed   Neurological:      Mental Status: She is alert  Gait: Gait abnormal    Psychiatric:      Comments: asleep              Procedures           Patient's care was coordinated with nursing facility staff  Recent vitals, labs and updated medications were reviewed on EMR or chart system of facility   Past Medical, surgical, social, medication and allergy history and patient's previous records were reviewed and updated as appropriate:     Patient Active Problem List   Diagnosis    Neurogenic bladder    [facility] osteomyelitis [facility] [address] [facility]    Depression    DVT (deep venous thrombosis) ([facility] [address] [facility] )    Heart murmur    HTN (hypertension)    Hyperlipidemia    Hyponatremia    Pressure ulcer of contiguous region involving left buttock and hip, unstageable ([facility] [address] )    Kidney lesion, native, right    Neurogenic bowel    Paralysis ([facility] [facility]    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[facility] [facility] )    Parkinson's disease ([facility] [facility] )    Postoperative anemia due to acute blood loss    Seizure disorder ([facility] [facility] )    Protein-calorie malnutrition ([address] )    Chronic right shoulder pain    CHF (congestive heart failure) ([facility] [address] [facility] )    Septic arthritis of hip ([facility] [address] [facility] )    Atrial fibrillation with RVR (HCC)    Chronic anticoagulation    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 [facility]    Loose stools    Anemia due to chronic illness    Indigestion    Dehydration    Deep tissue injury    Open wound     Past Medical History:   Diagnosis Date  Back pain     Congestive heart failure (HCC)     Depressive disorder     Hypertension     Neurogenic bladder     Open wound of leg     Osteoarthritis     Osteomyelitis ([facility] [address] [facility]     Paraplegia (HCC)     [facility]     Thrombosis     Ulcer of ankle ([facility] )     Urinary retention      Past Surgical History:   Procedure Laterality Date    CYSTOSCOPY      SKIN GRAFT       Social History     Socioeconomic History    Marital status: /Civil Union     Spouse name: None    Number of children: None    Years of education: None    Highest education level: None   Occupational History    None   Tobacco Use    Smoking status: Never Smoker    Smokeless tobacco: Never Used   Substance and Sexual Activity    Alcohol use: No    Drug use: No    Sexual activity: None   Other Topics Concern    None   Social History Narrative    None     Social Determinants of Health     Financial Resource Strain: Not on file   Food Insecurity: Not on file   Transportation Needs: Not on file   Physical Activity: Not on file   Stress: Not on file   Social Connections: Not on file   Intimate Partner Violence: Not on file   Housing Stability: Not on file        Current Outpatient Medications:     acetaminophen (TYLENOL) 325 mg tablet, Take 650 mg by mouth every 4 (four) hours as needed, Disp: , Rfl:     amLODIPine-benazepril (LOTREL) 10-20 MG per capsule, Take by mouth , Disp: , Rfl:     atorvastatin (LIPITOR) 20 mg tablet, Take 20 mg by mouth daily , Disp: , Rfl:     Biotin 10 MG CAPS, Take 10 mg by mouth daily, Disp: , Rfl:     bisacodyl (Dulcolax) 10 mg suppository, Insert 10 mg into the rectum daily, Disp: , Rfl:     carbidopa-levodopa (SINEMET)  mg per tablet, Take 1 tablet by mouth 4 (four) times a day, Disp: , Rfl:     Cranberry 450 MG TABS, Take 450 mg by mouth, Disp: , Rfl:     famotidine (PEPCID) 20 mg tablet, Take 20 mg by mouth 2 (two) times a day, Disp: , Rfl:     furosemide (LASIX) 20 mg tablet, Take 20 mg by mouth daily, Disp: , Rfl:     magnesium hydroxide (MILK OF MAGNESIA) 400 mg/5 mL oral suspension, Take by mouth daily as needed for constipation, Disp: , Rfl:     metoprolol tartrate (LOPRESSOR) 50 mg tablet, Take 50 mg by mouth 2 (two) times a day, Disp: , Rfl:     mirtazapine (REMERON) 15 mg tablet, Take 7 5 mg by mouth daily at bedtime, Disp: , Rfl:     Multiple Vitamin (DAILY VALUE MULTIVITAMIN) TABS, Take 1 tablet by mouth daily , Disp: , Rfl:     Multiple Vitamins-Minerals (HAIR SKIN NAILS PO), Take 3 tablets by mouth daily, Disp: , Rfl:     Nutritional Supplements (Micha) PACK, Take by mouth, Disp: , Rfl:     Omega-3 Fatty Acids (FISH OIL) 1,000 mg, Take 1,000 mg by mouth daily , Disp: , Rfl:     oxyCODONE (ROXICODONE) 5 mg immediate release tablet, Take 1 tablet (5 mg total) by mouth every 6 (six) hours as needed for moderate painMax Daily Amount: 20 mg, Disp: 15 tablet, Rfl: 0    phenytoin (DILANTIN) 100 mg ER capsule, Take 100 mg by mouth every 12 (twelve) hours , Disp: , Rfl:     potassium chloride (K-DUR,KLOR-CON) 10 mEq tablet, , Disp: , Rfl:     psyllium (Metamucil) 0 52 g capsule, Take 0 52 g by mouth daily, Disp: , Rfl:     saccharomyces boulardii (Florastor) 250 mg capsule, Take 250 mg by mouth 2 (two) times a day, Disp: , Rfl:     senna (SENOKOT) 8 6 MG tablet, Take 8 6 mg by mouth 2 (two) times a day, Disp: , Rfl:     sertraline (ZOLOFT) 25 mg tablet, Take 100 mg by mouth daily, Disp: , Rfl:     sodium phosphate-biphosphate (FLEET) 7-19 g 118 mL enema, Insert 1 enema into the rectum, Disp: , Rfl:     vitamin B-12 (CYANOCOBALAMIN) 250 MCG tablet, Take 500 mcg by mouth daily, Disp: , Rfl:     warfarin (COUMADIN) 6 mg tablet, Take 6 mg by mouth daily, Disp: , Rfl:   Family History   Problem Relation Age of Onset    Diabetes Father     Kidney cancer Father     Alzheimer's disease Mother     Bone cancer Paternal Uncle     Lung cancer Paternal Uncle Coordination of Care: Supervisor aware of the treatment plan    Patient / Staff education : Staff was given education on sign of infection and pressure ulcer prevention  Staff verbalized understanding     Follow up :  Return in about 1 week (around 3/8/2022)  Voice-recognition software may have been used in the preparation of this document  Occasional wrong word or "sound-alike" substitutions may have occurred due to the inherent limitations of voice recognition software  Interpretation should be guided by context        SVETLANA Milner

## 2022-03-08 ENCOUNTER — NURSING HOME VISIT (OUTPATIENT)
Dept: WOUND CARE | Facility: HOSPITAL | Age: 72
End: 2022-03-08
Payer: MEDICARE

## 2022-03-08 DIAGNOSIS — T14.8XXA DEEP TISSUE INJURY: ICD-10-CM

## 2022-03-08 DIAGNOSIS — G82.20 PARAPLEGIA FOLLOWING SPINAL CORD INJURY (HCC): Primary | ICD-10-CM

## 2022-03-08 PROCEDURE — 99308 SBSQ NF CARE LOW MDM 20: CPT | Performed by: NURSE PRACTITIONER

## 2022-03-08 NOTE — PROGRESS NOTES
[address] MANAGEMENT   AND [facility]       Patient ID: Rosi Bennett is a 80 y o  female Date of Birth 1950     Location of Service: [place] [address]    Performed wound round with: supervisor      Chief Complaint   Patient presents with    Follow Up Wound Care Visit     Rigt foot plantar       Wound Instructions:  Orders Placed This Encounter   Procedures    Wound cleansing and dressings     Wound:  Right plantar   Discontinue previous wound order  Cleanse the wound bed with soap and water, pat dry  Apply hydraguard to wound bed  Frequency : twice a day and prn for soiling     Refer to inhouse podiatry  Continue prevalon boots when in bed  Offload all wounds  Turn and reposition frequently, maximum of every two hours  Instruct / Assist with weight shifting every 15 - 20 minutes when in chair  Increase protein intake  Monitor for any sign of infection or worsening, inform PCP or patient's primary physician in your facility     Standing Status:   Future     Standing Expiration Date:   3/8/2023       Allergies  Latex and Other      Assessment & Plan:  1  Paraplegia following spinal cord injury [facility])  Assessment & Plan:  dependent with positioning when in bed      2  Deep tissue injury  Assessment & Plan:  Right plantar  -wound - stable, ,  the purple area is superficial, dry skin  - local wound care with hydraguard  - ordered arterial duplex - pending result  DON aware  - referred to podiatry - pending    Orders:  -     Wound cleansing and dressings; Future           Subjective: Followup for wound on the right plantar  Received patient in bed, asleep  Staff did not report any new wounds  No significant issues related to the wound  DON was made aware of the pending arterial duplex result and podiatry referral  Patient is using heel boot  Review of Systems   Reason unable to perform ROS: asleep  Objective: There were no vitals taken for this visit      Physical Exam  HENT:      Head: Normocephalic  Nose: Nose normal    Eyes:      Comments: asleep   Pulmonary:      Effort: Pulmonary effort is normal    Abdominal:      Palpations: Abdomen is soft  Genitourinary:     Comments: With angel  Musculoskeletal:      Comments: paraplegia   Skin:     Findings: Lesion present  Comments: Right foot plantar - 5 5 x 3 x 0 1 cm, purple, non blanchable, no drainage, no obvious sign of infection, periwound is dry, denies pain     Neurological:      Mental Status: She is alert  Gait: Gait abnormal    Psychiatric:      Comments: asleep              Procedures           Patient's care was coordinated with nursing facility staff  Recent vitals, labs and updated medications were reviewed on EMR or chart system of facility   Past Medical, surgical, social, medication and allergy history and patient's previous records were reviewed and updated as appropriate:     Patient Active Problem List   Diagnosis    Neurogenic bladder    [facility] osteomyelitis [facility] )    Depression    DVT (deep venous thrombosis) [facility]    Heart murmur    HTN (hypertension)    Hyperlipidemia    Hyponatremia    Pressure ulcer of contiguous region involving left buttock and hip, unstageable ([facility] )    Kidney lesion, native, right    Neurogenic bowel    Paralysis ([facility]    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[facility] )    Parkinson's disease ([facility]    Postoperative anemia due to acute blood loss    Seizure disorder ([address] )    Protein-calorie malnutrition ([address] )    Chronic right shoulder pain    CHF (congestive heart failure) ([address] )    Septic arthritis of hip ([facility] )    [facility] with RVR [facility]    Chronic anticoagulation    Pressure injury of 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 ([facility])    Loose stools    Anemia due to chronic illness    Indigestion    Dehydration    Deep tissue injury    Open wound     Past Medical History:   Diagnosis Date    Back pain     Congestive heart failure (HCC)     Depressive disorder     Hypertension     Neurogenic bladder     Open wound of leg     Osteoarthritis     [facility]omyel[facility] [facility]     Paraplegia (HCC)     [facility]     Thrombosis     Ulcer of ankle ([facility]     Urinary retention      Past Surgical History:   Procedure Laterality Date    CYSTOSCOPY      SKIN GRAFT       Social History     Socioeconomic History    Marital status: /Civil Union     Spouse name: None    Number of children: None    Years of education: None    Highest education level: None   Occupational History    None   Tobacco Use    Smoking status: Never Smoker    Smokeless tobacco: Never Used   Substance and Sexual Activity    Alcohol use: No    Drug use: No    Sexual activity: None   Other Topics Concern    None   Social History Narrative    None     Social Determinants of Health     Financial Resource Strain: Not on file   Food Insecurity: Not on file   Transportation Needs: Not on file   Physical Activity: Not on file   Stress: Not on file   Social Connections: Not on file   Intimate Partner Violence: Not on file   Housing Stability: Not on file        Current Outpatient Medications:     acetaminophen (TYLENOL) 325 mg tablet, Take 650 mg by mouth every 4 (four) hours as needed, Disp: , Rfl:     amLODIPine-benazepril (LOTREL) 10-20 MG per capsule, Take by mouth , Disp: , Rfl:     atorvastatin (LIPITOR) 20 mg tablet, Take 20 mg by mouth daily , Disp: , Rfl:     Biotin 10 MG CAPS, Take 10 mg by mouth daily, Disp: , Rfl:     bisacodyl (Dulcolax) 10 mg suppository, Insert 10 mg into the rectum daily, Disp: , Rfl:     carbidopa-levodopa (SINEMET)  mg per tablet, Take 1 tablet by mouth 4 (four) times a day, Disp: , Rfl:     Cranberry 450 MG TABS, Take 450 mg by mouth, Disp: , Rfl:     famotidine (PEPCID) 20 mg tablet, Take 20 mg by mouth 2 (two) times a day, Disp: , Rfl:     furosemide (LASIX) 20 mg tablet, Take 20 mg by mouth daily, Disp: , Rfl:     magnesium hydroxide (MILK OF MAGNESIA) 400 mg/5 mL oral suspension, Take by mouth daily as needed for constipation, Disp: , Rfl:     metoprolol tartrate (LOPRESSOR) 50 mg tablet, Take 50 mg by mouth 2 (two) times a day, Disp: , Rfl:     mirtazapine (REMERON) 15 mg tablet, Take 7 5 mg by mouth daily at bedtime, Disp: , Rfl:     Multiple Vitamin (DAILY VALUE MULTIVITAMIN) TABS, Take 1 tablet by mouth daily , Disp: , Rfl:     Multiple Vitamins-Minerals (HAIR SKIN NAILS PO), Take 3 tablets by mouth daily, Disp: , Rfl:     Nutritional Supplements (Micha) PACK, Take by mouth, Disp: , Rfl:     Omega-3 Fatty Acids (FISH OIL) 1,000 mg, Take 1,000 mg by mouth daily , Disp: , Rfl:     oxyCODONE (ROXICODONE) 5 mg immediate release tablet, Take 1 tablet (5 mg total) by mouth every 6 (six) hours as needed for moderate painMax Daily Amount: 20 mg, Disp: 15 tablet, Rfl: 0    phenytoin (DILANTIN) 100 mg ER capsule, Take 100 mg by mouth every 12 (twelve) hours , Disp: , Rfl:     potassium chloride (K-DUR,KLOR-CON) 10 mEq tablet, , Disp: , Rfl:     psyllium (Metamucil) 0 52 g capsule, Take 0 52 g by mouth daily, Disp: , Rfl:     saccharomyces boulardii (Florastor) 250 mg capsule, Take 250 mg by mouth 2 (two) times a day, Disp: , Rfl:     senna (SENOKOT) 8 6 MG tablet, Take 8 6 mg by mouth 2 (two) times a day, Disp: , Rfl:     sertraline (ZOLOFT) 25 mg tablet, Take 100 mg by mouth daily, Disp: , Rfl:     sodium phosphate-biphosphate (FLEET) 7-19 g 118 mL enema, Insert 1 enema into the rectum, Disp: , Rfl:     vitamin B-12 (CYANOCOBALAMIN) 250 MCG tablet, Take 500 mcg by mouth daily, Disp: , Rfl:     warfarin (COUMADIN) 6 mg tablet, Take 6 mg by mouth daily, Disp: , Rfl:   Family History   Problem Relation Age of Onset    Diabetes Father     Kidney cancer Father     Alzheimer's disease Mother     Bone cancer Paternal Uncle     Lung cancer Paternal Uncle               Coordination of Care: Supervisor aware of the treatment plan    Patient / Staff education : Staff was given education on sign of infection and pressure ulcer prevention  Staff verbalized understanding     Follow up :  Return in about 2 weeks (around 3/22/2022)  Voice-recognition software may have been used in the preparation of this document  Occasional wrong word or "sound-alike" substitutions may have occurred due to the inherent limitations of voice recognition software  Interpretation should be guided by context        SVETLANA Stern

## 2022-03-08 NOTE — PATIENT INSTRUCTIONS
Orders Placed This Encounter   Procedures    Wound cleansing and dressings     Wound:  Right plantar   Discontinue previous wound order  Cleanse the wound bed with soap and water, pat dry  Apply hydraguard to wound bed  Frequency : twice a day and prn for soiling     Refer to inhouse podiatry  Continue prevalon boots when in bed  Offload all wounds  Turn and reposition frequently, maximum of every two hours  Instruct / Assist with weight shifting every 15 - 20 minutes when in chair  Increase protein intake    Monitor for any sign of infection or worsening, inform PCP or patient's primary physician in your facility     Standing Status:   Future     Standing Expiration Date:   3/8/2023

## 2022-03-08 NOTE — ASSESSMENT & PLAN NOTE
Right plantar  -wound - stable, ,  the purple area is superficial, dry skin  - local wound care with hydraguard  - ordered arterial duplex - pending result   DON aware  - referred to podiatry - pending

## 2022-03-15 ENCOUNTER — NURSING HOME VISIT (OUTPATIENT)
Dept: WOUND CARE | Facility: HOSPITAL | Age: 72
End: 2022-03-15
Payer: MEDICARE

## 2022-03-15 DIAGNOSIS — G82.20 PARAPLEGIA FOLLOWING SPINAL CORD INJURY (HCC): Primary | ICD-10-CM

## 2022-03-15 DIAGNOSIS — T14.8XXA DEEP TISSUE INJURY: ICD-10-CM

## 2022-03-15 PROCEDURE — 99308 SBSQ NF CARE LOW MDM 20: CPT | Performed by: NURSE PRACTITIONER

## 2022-03-15 NOTE — ASSESSMENT & PLAN NOTE
Right plantar  -wound - stable, ,  the purple area is superficial, dry skin  - local wound care with hydraguard  - ordered arterial duplex - pending result  DON aware  - referred to podiatry - pending  - will follow up in two weeks

## 2022-03-15 NOTE — PROGRESS NOTES
[address] MANAGEMENT   AND [facility]PER[facility]IC [facility]INE [facility]ENTER       Patient ID: Luis Alberto Costa is a 80 y o  female Date of Birth 1950     Location of Service: [place] [address]    Performed wound round with: supervisor      Chief Complaint   Patient presents with    Follow Up Wound Care Visit     Right foot plantar       Wound Instructions:  Orders Placed This Encounter   Procedures    Wound cleansing and dressings     Wound:  Right plantar   Discontinue previous wound order  Cleanse the wound bed with soap and water, pat dry  Apply hydraguard to wound bed  Frequency : twice a day and prn for soiling     Refer to inhouse podiatry  Continue prevalon boots when in bed  Offload all wounds  Turn and reposition frequently, maximum of every two hours  Instruct / Assist with weight shifting every 15 - 20 minutes when in chair  Increase protein intake  Monitor for any sign of infection or worsening, inform PCP or patient's primary physician in your facility     Standing Status:   Future     Standing Expiration Date:   3/15/2023       Allergies  Latex and Other      Assessment & Plan:  1  Paraplegia following spinal cord injury [facility])  Assessment & Plan:  dependent with positioning when in bed    Orders:  -     Wound cleansing and dressings; Future    2  Deep tissue injury  Assessment & Plan:  Right plantar  -wound - stable, ,  the purple area is superficial, dry skin  - local wound care with hydraguard  - ordered arterial duplex - pending result  DON aware  - referred to podiatry - pending  - will follow up in two weeks  Subjective: Followup for wound on the right plantar  Received patient in bed, asleep  Staff did not report any new wounds  No significant issues related to the wound  Review of Systems   Reason unable to perform ROS: asleep  Objective: There were no vitals taken for this visit  Physical Exam  HENT:      Head: Normocephalic        Nose: Nose normal    Eyes: Comments: asleep   Pulmonary:      Effort: Pulmonary effort is normal    Abdominal:      Palpations: Abdomen is soft  Genitourinary:     Comments: With angel  Musculoskeletal:      Comments: paraplegia   Skin:     Findings: Lesion present  Comments: Right foot plantar - 5 0 x 2 5 x 0 1 cm, purple, non blanchable, no drainage, no obvious sign of infection, periwound is dry, denies pain     Neurological:      Mental Status: She is alert  Gait: Gait abnormal    Psychiatric:      Comments: asleep              Procedures           Patient's care was coordinated with nursing facility staff  Recent vitals, labs and updated medications were reviewed on EMR or chart system of facility   Past Medical, surgical, social, medication and allergy history and patient's previous records were reviewed and updated as appropriate:     Patient Active Problem List   Diagnosis    Neurogenic bladder    [facility]elitis [facility]    Depression    [facility]    Heart murmur    HTN (hypertension)    Hyperlipidemia    Hyponatremia    Pressure ulcer of contiguous region involving left buttock and hip, unstageable ([facility] )    Kidney lesion, native, right    Neurogenic bowel    Paralysis ([facility]    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[facility] )    Parkinson's disease ([address] )    Postoperative anemia due to acute blood loss    Seizure disorder ([address] )    Protein-calorie malnutrition ([address] )    Chronic right shoulder pain    CHF (congestive heart failure) ([facility] )    Septic arthritis of hip ([address] )    Atrial fibrillation with RVR ([facility]    Chronic anticoagulation    Pressure injury of 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 ([facility]    Loose stools    Anemia due to chronic illness    Indigestion    Dehydration    Deep tissue injury    Open wound     Past Medical History:   Diagnosis Date    Back pain     Congestive heart failure (HCC)     Depressive disorder     Hypertension     Neurogenic bladder     Open wound of leg     Osteoarthritis     Osteomyelitis (HCC)     Paraplegia (HCC)     [facility]     Thrombosis     Ulcer of ankle ([facility]     Urinary retention      Past Surgical History:   Procedure Laterality Date    CYSTOSCOPY      SKIN GRAFT       Social History     Socioeconomic History    Marital status: /Civil Union     Spouse name: None    Number of children: None    Years of education: None    Highest education level: None   Occupational History    None   Tobacco Use    Smoking status: Never Smoker    Smokeless tobacco: Never Used   Substance and Sexual Activity    Alcohol use: No    Drug use: No    Sexual activity: None   Other Topics Concern    None   Social History Narrative    None     Social Determinants of Health     Financial Resource Strain: Not on file   Food Insecurity: Not on file   Transportation Needs: Not on file   Physical Activity: Not on file   Stress: Not on file   Social Connections: Not on file   Intimate Partner Violence: Not on file   Housing Stability: Not on file        Current Outpatient Medications:     acetaminophen (TYLENOL) 325 mg tablet, Take 650 mg by mouth every 4 (four) hours as needed, Disp: , Rfl:     amLODIPine-benazepril (LOTREL) 10-20 MG per capsule, Take by mouth , Disp: , Rfl:     atorvastatin (LIPITOR) 20 mg tablet, Take 20 mg by mouth daily , Disp: , Rfl:     Biotin 10 MG CAPS, Take 10 mg by mouth daily, Disp: , Rfl:     bisacodyl (Dulcolax) 10 mg suppository, Insert 10 mg into the rectum daily, Disp: , Rfl:     carbidopa-levodopa (SINEMET)  mg per tablet, Take 1 tablet by mouth 4 (four) times a day, Disp: , Rfl:     Cranberry 450 MG TABS, Take 450 mg by mouth, Disp: , Rfl:     famotidine (PEPCID) 20 mg tablet, Take 20 mg by mouth 2 (two) times a day, Disp: , Rfl:     furosemide (LASIX) 20 mg tablet, Take 20 mg by mouth daily, Disp: , Rfl:     magnesium hydroxide (MILK OF MAGNESIA) 400 mg/5 mL oral suspension, Take by mouth daily as needed for constipation, Disp: , Rfl:     metoprolol tartrate (LOPRESSOR) 50 mg tablet, Take 50 mg by mouth 2 (two) times a day, Disp: , Rfl:     mirtazapine (REMERON) 15 mg tablet, Take 7 5 mg by mouth daily at bedtime, Disp: , Rfl:     Multiple Vitamin (DAILY VALUE MULTIVITAMIN) TABS, Take 1 tablet by mouth daily , Disp: , Rfl:     Multiple Vitamins-Minerals (HAIR SKIN NAILS PO), Take 3 tablets by mouth daily, Disp: , Rfl:     Nutritional Supplements (Micha) PACK, Take by mouth, Disp: , Rfl:     Omega-3 Fatty Acids (FISH OIL) 1,000 mg, Take 1,000 mg by mouth daily , Disp: , Rfl:     oxyCODONE (ROXICODONE) 5 mg immediate release tablet, Take 1 tablet (5 mg total) by mouth every 6 (six) hours as needed for moderate painMax Daily Amount: 20 mg, Disp: 15 tablet, Rfl: 0    phenytoin (DILANTIN) 100 mg ER capsule, Take 100 mg by mouth every 12 (twelve) hours , Disp: , Rfl:     potassium chloride (K-DUR,KLOR-CON) 10 mEq tablet, , Disp: , Rfl:     psyllium (Metamucil) 0 52 g capsule, Take 0 52 g by mouth daily, Disp: , Rfl:     saccharomyces boulardii (Florastor) 250 mg capsule, Take 250 mg by mouth 2 (two) times a day, Disp: , Rfl:     senna (SENOKOT) 8 6 MG tablet, Take 8 6 mg by mouth 2 (two) times a day, Disp: , Rfl:     sertraline (ZOLOFT) 25 mg tablet, Take 100 mg by mouth daily, Disp: , Rfl:     sodium phosphate-biphosphate (FLEET) 7-19 g 118 mL enema, Insert 1 enema into the rectum, Disp: , Rfl:     vitamin B-12 (CYANOCOBALAMIN) 250 MCG tablet, Take 500 mcg by mouth daily, Disp: , Rfl:     warfarin (COUMADIN) 6 mg tablet, Take 6 mg by mouth daily, Disp: , Rfl:   Family History   Problem Relation Age of Onset    Diabetes Father     Kidney cancer Father     Alzheimer's disease Mother     Bone cancer Paternal Uncle     Lung cancer Paternal Uncle               Coordination of Care: Supervisor aware of the treatment plan    Patient / Staff education : Staff was given education on sign of infection and pressure ulcer prevention  Staff verbalized understanding     Follow up :  Return in about 2 weeks (around 3/29/2022)  Voice-recognition software may have been used in the preparation of this document  Occasional wrong word or "sound-alike" substitutions may have occurred due to the inherent limitations of voice recognition software  Interpretation should be guided by context        SVETLANA Barrera

## 2022-03-15 NOTE — PATIENT INSTRUCTIONS
Orders Placed This Encounter   Procedures    Wound cleansing and dressings     Wound:  Right plantar   Discontinue previous wound order  Cleanse the wound bed with soap and water, pat dry  Apply hydraguard to wound bed  Frequency : twice a day and prn for soiling     Refer to inhouse podiatry  Continue prevalon boots when in bed  Offload all wounds  Turn and reposition frequently, maximum of every two hours  Instruct / Assist with weight shifting every 15 - 20 minutes when in chair  Increase protein intake    Monitor for any sign of infection or worsening, inform PCP or patient's primary physician in your facility     Standing Status:   Future     Standing Expiration Date:   3/15/2023

## 2022-03-22 ENCOUNTER — NURSING HOME VISIT (OUTPATIENT)
Dept: WOUND CARE | Facility: HOSPITAL | Age: 72
End: 2022-03-22
Payer: MEDICARE

## 2022-03-22 DIAGNOSIS — G82.20 PARAPLEGIA FOLLOWING SPINAL CORD INJURY (HCC): Primary | ICD-10-CM

## 2022-03-22 DIAGNOSIS — T14.8XXA DEEP TISSUE INJURY: ICD-10-CM

## 2022-03-22 PROCEDURE — 99308 SBSQ NF CARE LOW MDM 20: CPT | Performed by: NURSE PRACTITIONER

## 2022-03-22 NOTE — PATIENT INSTRUCTIONS
Orders Placed This Encounter   Procedures    Wound cleansing and dressings     Wound:  Right plantar   Discontinue previous wound order  Cleanse the wound bed with soap and water, pat dry  Apply hydraguard to wound bed  Frequency : twice a day and prn for soiling    Continue prevalon boots when in bed  Offload all wounds  Turn and reposition frequently, maximum of every two hours  Instruct / Assist with weight shifting every 15 - 20 minutes when in chair  Increase protein intake    Monitor for any sign of infection or worsening, inform PCP or patient's primary physician in your facility     Standing Status:   Future     Standing Expiration Date:   3/22/2023

## 2022-03-22 NOTE — ASSESSMENT & PLAN NOTE
Right plantar  -wound - stable, ,  the purple area is superficial, dry skin   - local wound care with hydraguard  - ordered arterial duplex - pending result   DON aware  - referred to podiatry - pending  - will follow up monthly to monitor for worsening

## 2022-03-22 NOTE — PROGRESS NOTES
[address] MANAGEMENT   AND [facility]ENTER       Patient ID: Hiram Faust is a 80 y o  female Date of Birth 1950     Location of Service: [place] [address]    Performed wound round with: supervisor      Chief Complaint   Patient presents with    Follow Up Wound Care Visit     Right foot plantar       Wound Instructions:  Orders Placed This Encounter   Procedures    Wound cleansing and dressings     Wound:  Right plantar   Discontinue previous wound order  Cleanse the wound bed with soap and water, pat dry  Apply hydraguard to wound bed  Frequency : twice a day and prn for soiling    Continue prevalon boots when in bed  Offload all wounds  Turn and reposition frequently, maximum of every two hours  Instruct / Assist with weight shifting every 15 - 20 minutes when in chair  Increase protein intake  Monitor for any sign of infection or worsening, inform PCP or patient's primary physician in your facility     Standing Status:   Future     Standing Expiration Date:   3/22/2023       Allergies  Latex and Other      Assessment & Plan:  1  Paraplegia following spinal cord injury [facility])  Assessment & Plan:  dependent with positioning when in bed      2  Deep tissue injury  Assessment & Plan:  Right plantar  -wound - stable, ,  the purple area is superficial, dry skin   - local wound care with hydraguard  - ordered arterial duplex - pending result  DON aware  - referred to podiatry - pending  - will follow up monthly to monitor for worsening    Orders:  -     Wound cleansing and dressings; Future           Subjective: Followup for wound on the right plantar  Received patient in bed, asleep  Staff did not report any new wounds  Patient was seen as per request of facility  Review of Systems   Reason unable to perform ROS: asleep  Objective: There were no vitals taken for this visit  Physical Exam  HENT:      Head: Normocephalic        Nose: Nose normal    Eyes:      Comments: asleep   Pulmonary:      Effort: Pulmonary effort is normal    Abdominal:      Palpations: Abdomen is soft  Genitourinary:     Comments: With angel  Musculoskeletal:      Comments: paraplegia   Skin:     Findings: Lesion present  Comments: Right foot plantar - 5 0 x 2 5 x 0 1 cm, purple, non blanchable, no drainage, no obvious sign of infection, periwound is dry, denies pain  superficial     Neurological:      Mental Status: She is alert  Gait: Gait abnormal    Psychiatric:      Comments: asleep              Procedures           Patient's care was coordinated with nursing facility staff  Recent vitals, labs and updated medications were reviewed on EMR or chart system of facility   Past Medical, surgical, social, medication and allergy history and patient's previous records were reviewed and updated as appropriate:     Patient Active Problem List   Diagnosis    Neurogenic bladder    [facility]    Depression    [facility]) [facility]    Heart murmur    HTN (hypertension)    Hyperlipidemia    Hyponatremia    Pressure ulcer of contiguous region involving left buttock and hip, unstageable ([facility] [facility] )    Kidney lesion, native, right    Neurogenic bowel    Paralysis ([facility] [facility] )    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[facility] [address] [facility] )    Parkinson's disease ([facility] [address] [facility]    Postoperative anemia due to acute blood loss    Seizure disorder ([address] )    Protein-calorie malnutrition ([facility] [address] [facility] )    Chronic right shoulder pain    CHF (congestive heart failure) ([facility] )    Septic arthritis of hip ([facility] [address] )    Atrial fibrillation with RVR ([facility] [facility] )    Chronic anticoagulation    Pressure injury of 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 ([facility])    Loose stools    Anemia due to chronic illness    Indigestion    Dehydration    Deep tissue injury    Open wound     Past Medical History:   Diagnosis Date    Back pain     Congestive heart failure (HCC)     Depressive disorder     Hypertension     Neurogenic bladder     Open wound of leg     Osteoarthritis     Osteomyelitis (HCC)     Paraplegia (HCC)     Seizure [facility]     Thrombosis     Ulcer of ankle ([facility] [facility]     Urinary retention      Past Surgical History:   Procedure Laterality Date    CYSTOSCOPY      SKIN GRAFT       Social History     Socioeconomic History    Marital status: /Civil Union     Spouse name: None    Number of children: None    Years of education: None    Highest education level: None   Occupational History    None   Tobacco Use    Smoking status: Never Smoker    Smokeless tobacco: Never Used   Substance and Sexual Activity    Alcohol use: No    Drug use: No    Sexual activity: None   Other Topics Concern    None   Social History Narrative    None     Social Determinants of Health     Financial Resource Strain: Not on file   Food Insecurity: Not on file   Transportation Needs: Not on file   Physical Activity: Not on file   Stress: Not on file   Social Connections: Not on file   Intimate Partner Violence: Not on file   Housing Stability: Not on file        Current Outpatient Medications:     acetaminophen (TYLENOL) 325 mg tablet, Take 650 mg by mouth every 4 (four) hours as needed, Disp: , Rfl:     amLODIPine-benazepril (LOTREL) 10-20 MG per capsule, Take by mouth , Disp: , Rfl:     atorvastatin (LIPITOR) 20 mg tablet, Take 20 mg by mouth daily , Disp: , Rfl:     Biotin 10 MG CAPS, Take 10 mg by mouth daily, Disp: , Rfl:     bisacodyl (Dulcolax) 10 mg suppository, Insert 10 mg into the rectum daily, Disp: , Rfl:     carbidopa-levodopa (SINEMET)  mg per tablet, Take 1 tablet by mouth 4 (four) times a day, Disp: , Rfl:     Cranberry 450 MG TABS, Take 450 mg by mouth, Disp: , Rfl:     famotidine (PEPCID) 20 mg tablet, Take 20 mg by mouth 2 (two) times a day, Disp: , Rfl:     furosemide (LASIX) 20 mg tablet, Take 20 mg by mouth daily, Disp: , Rfl:     magnesium hydroxide (MILK OF MAGNESIA) 400 mg/5 mL oral suspension, Take by mouth daily as needed for constipation, Disp: , Rfl:     metoprolol tartrate (LOPRESSOR) 50 mg tablet, Take 50 mg by mouth 2 (two) times a day, Disp: , Rfl:     mirtazapine (REMERON) 15 mg tablet, Take 7 5 mg by mouth daily at bedtime, Disp: , Rfl:     Multiple Vitamin (DAILY VALUE MULTIVITAMIN) TABS, Take 1 tablet by mouth daily , Disp: , Rfl:     Multiple Vitamins-Minerals (HAIR SKIN NAILS PO), Take 3 tablets by mouth daily, Disp: , Rfl:     Nutritional Supplements (Micha) PACK, Take by mouth, Disp: , Rfl:     Omega-3 Fatty Acids (FISH OIL) 1,000 mg, Take 1,000 mg by mouth daily , Disp: , Rfl:     oxyCODONE (ROXICODONE) 5 mg immediate release tablet, Take 1 tablet (5 mg total) by mouth every 6 (six) hours as needed for moderate painMax Daily Amount: 20 mg, Disp: 15 tablet, Rfl: 0    phenytoin (DILANTIN) 100 mg ER capsule, Take 100 mg by mouth every 12 (twelve) hours , Disp: , Rfl:     potassium chloride (K-DUR,KLOR-CON) 10 mEq tablet, , Disp: , Rfl:     psyllium (Metamucil) 0 52 g capsule, Take 0 52 g by mouth daily, Disp: , Rfl:     saccharomyces boulardii (Florastor) 250 mg capsule, Take 250 mg by mouth 2 (two) times a day, Disp: , Rfl:     senna (SENOKOT) 8 6 MG tablet, Take 8 6 mg by mouth 2 (two) times a day, Disp: , Rfl:     sertraline (ZOLOFT) 25 mg tablet, Take 100 mg by mouth daily, Disp: , Rfl:     sodium phosphate-biphosphate (FLEET) 7-19 g 118 mL enema, Insert 1 enema into the rectum, Disp: , Rfl:     vitamin B-12 (CYANOCOBALAMIN) 250 MCG tablet, Take 500 mcg by mouth daily, Disp: , Rfl:     warfarin (COUMADIN) 6 mg tablet, Take 6 mg by mouth daily, Disp: , Rfl:   Family History   Problem Relation Age of Onset    Diabetes Father     Kidney cancer Father     Alzheimer's disease Mother     Bone cancer Paternal Uncle     Lung cancer Paternal Uncle               Coordination of Care: Supervisor aware of the treatment plan    Patient / Staff education : Staff was given education on sign of infection and pressure ulcer prevention  Staff verbalized understanding     Follow up :  Return in about 1 month (around 4/22/2022)  Voice-recognition software may have been used in the preparation of this document  Occasional wrong word or "sound-alike" substitutions may have occurred due to the inherent limitations of voice recognition software  Interpretation should be guided by context        SVETLANA Nunez

## 2022-04-12 ENCOUNTER — NURSING HOME VISIT (OUTPATIENT)
Dept: WOUND CARE | Facility: HOSPITAL | Age: 72
End: 2022-04-12
Payer: MEDICARE

## 2022-04-12 DIAGNOSIS — G82.20 PARAPLEGIA FOLLOWING SPINAL CORD INJURY (HCC): Primary | ICD-10-CM

## 2022-04-12 DIAGNOSIS — T14.8XXA DEEP TISSUE INJURY: ICD-10-CM

## 2022-04-12 DIAGNOSIS — T14.8XXA OPEN WOUND: ICD-10-CM

## 2022-04-12 PROCEDURE — 99308 SBSQ NF CARE LOW MDM 20: CPT | Performed by: NURSE PRACTITIONER

## 2022-04-12 NOTE — PROGRESS NOTES
[address] MANAGEMENT   AND HYPERBARIC MEDICINE CENTER       Patient ID: Makeda Navarro is a 80 y o  female Date of Birth 1950     Location of Service: [facility]    Performed wound round with: supervisor      Chief Complaint   Patient presents with    Follow Up Wound Care Visit     [facility] plantar       Wound Instructions:  Orders Placed This Encounter   Procedures    Wound cleansing and dressings     Wound:  Right plantar   Discontinue previous wound order  Cleanse the wound bed with soap and water, pat dry  Apply zinc oxide to wound bed ( open area) and skin prep to purple area  Frequency : daily and prn for soiling     Continue prevalon boots when in bed  Offload all wounds  Turn and reposition frequently, maximum of every two hours  Instruct / Assist with weight shifting every 15 - 20 minutes when in chair  Increase protein intake  Monitor for any sign of infection or worsening, inform PCP or patient's primary physician in your facility     Standing Status:   Future     Standing Expiration Date:   4/12/2023       Allergies  Latex and Other      Assessment & Plan:  1  Paraplegia following spinal cord injury [facility])  Assessment & Plan:  dependent with positioning when in bed      2  Deep tissue injury  Assessment & Plan:  Right plantar  -wound - resolved, the purple area is discoloration and superficial  - skin prep for prevention    Orders:  -     Wound cleansing and dressings; Future    3  Open wound  Assessment & Plan:  Right foot plantar  - unknown etiology, discovered - 4/12/222  - wound size is 0 5 x 0 3  X 0 1 cm, 100% epithelial, with no obvious sign of infection  - local wound care with zinc oxide  - followup in 2 weeks    Orders:  -     Wound cleansing and dressings; Future           Subjective: Followup for wound on the right plantar  Received patient in bed, asleep  No significant issues related to the wound  Review of Systems   Reason unable to perform ROS: asleep  Objective: There were no vitals taken for this visit  Physical Exam  HENT:      Head: Normocephalic  Nose: Nose normal    Eyes:      Comments: asleep   Pulmonary:      Effort: Pulmonary effort is normal    Abdominal:      Palpations: Abdomen is soft  Genitourinary:     Comments: With angel  Musculoskeletal:      Comments: paraplegia   Skin:     Findings: Lesion present  Comments: Right foot plantar - purple discoloration, superficial    Right foot plantar ( middle) - wound size is 0 5 x 0 3 x 0 1 cm , 100% epithelial, no drainage, periwound is normal, no malodor, no obvious sign of infection,    Neurological:      Mental Status: She is alert  Gait: Gait abnormal    Psychiatric:      Comments: asleep              Procedures           Patient's care was coordinated with nursing facility staff  Recent vitals, labs and updated medications were reviewed on EMR or chart system of facility   Past Medical, surgical, social, medication and allergy history and patient's previous records were reviewed and updated as appropriate:     Patient Active Problem List   Diagnosis    Neurogenic bladder    Chronic osteomyelitis ([facility] [address] [facility]    Depression    DVT (deep venous thrombosis) ([facility]    Heart murmur    HTN (hypertension)    Hyperlipidemia    Hyponatremia    Pressure ulcer of contiguous region involving left buttock and hip, unstageable ([facility] [address] [facility]    Kidney lesion, native, right    Neurogenic bowel    Paralysis ([facility]    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[facility] [address] [facility]    Parkinson's disease ([facility] [facility]    Postoperative anemia due to acute blood loss    Seizure disorder ([address] )    Protein-calorie malnutrition ([facility] [facility]    Chronic right shoulder pain    CHF (congestive heart failure) ([facility] [facility]    Septic arthritis of hip ([facility] [facility]    [facility] RVR [facility]    Chronic anticoagulation    Pressure injury of 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 ([facility]    Loose stools    Anemia due to chronic illness    Indigestion    Dehydration    Deep tissue injury    Open wound     Past Medical History:   Diagnosis Date    Back pain     Congestive heart failure (HCC)     Depressive disorder     Hypertension     Neurogenic bladder     Open wound of leg     Osteoarthritis     Osteomyelitis ([facility] )     Paraplegia (HCC)     [facility]     Thrombosis     Ulcer of ankle ([facility]     Urinary retention      Past Surgical History:   Procedure Laterality Date    CYSTOSCOPY      SKIN GRAFT       Social History     Socioeconomic History    Marital status: /Civil Union     Spouse name: None    Number of children: None    Years of education: None    Highest education level: None   Occupational History    None   Tobacco Use    Smoking status: Never Smoker    Smokeless tobacco: Never Used   Substance and Sexual Activity    Alcohol use: No    Drug use: No    Sexual activity: None   Other Topics Concern    None   Social History Narrative    None     Social Determinants of Health     Financial Resource Strain: Not on file   Food Insecurity: Not on file   Transportation Needs: Not on file   Physical Activity: Not on file   Stress: Not on file   Social Connections: Not on file   Intimate Partner Violence: Not on file   Housing Stability: Not on file        Current Outpatient Medications:     acetaminophen (TYLENOL) 325 mg tablet, Take 650 mg by mouth every 4 (four) hours as needed, Disp: , Rfl:     amLODIPine-benazepril (LOTREL) 10-20 MG per capsule, Take by mouth , Disp: , Rfl:     atorvastatin (LIPITOR) 20 mg tablet, Take 20 mg by mouth daily , Disp: , Rfl:     Biotin 10 MG CAPS, Take 10 mg by mouth daily, Disp: , Rfl:     bisacodyl (Dulcolax) 10 mg suppository, Insert 10 mg into the rectum daily, Disp: , Rfl:     carbidopa-levodopa (SINEMET)  mg per tablet, Take 1 tablet by mouth 4 (four) times a day, Disp: , Rfl:     Cranberry 450 MG TABS, Take 450 mg by mouth, Disp: , Rfl:     famotidine (PEPCID) 20 mg tablet, Take 20 mg by mouth 2 (two) times a day, Disp: , Rfl:     furosemide (LASIX) 20 mg tablet, Take 20 mg by mouth daily, Disp: , Rfl:     magnesium hydroxide (MILK OF MAGNESIA) 400 mg/5 mL oral suspension, Take by mouth daily as needed for constipation, Disp: , Rfl:     metoprolol tartrate (LOPRESSOR) 50 mg tablet, Take 50 mg by mouth 2 (two) times a day, Disp: , Rfl:     mirtazapine (REMERON) 15 mg tablet, Take 7 5 mg by mouth daily at bedtime, Disp: , Rfl:     Multiple Vitamin (DAILY VALUE MULTIVITAMIN) TABS, Take 1 tablet by mouth daily , Disp: , Rfl:     Multiple Vitamins-Minerals (HAIR SKIN NAILS PO), Take 3 tablets by mouth daily, Disp: , Rfl:     Nutritional Supplements (Micha) PACK, Take by mouth, Disp: , Rfl:     Omega-3 Fatty Acids (FISH OIL) 1,000 mg, Take 1,000 mg by mouth daily , Disp: , Rfl:     oxyCODONE (ROXICODONE) 5 mg immediate release tablet, Take 1 tablet (5 mg total) by mouth every 6 (six) hours as needed for moderate painMax Daily Amount: 20 mg, Disp: 15 tablet, Rfl: 0    phenytoin (DILANTIN) 100 mg ER capsule, Take 100 mg by mouth every 12 (twelve) hours , Disp: , Rfl:     potassium chloride (K-DUR,KLOR-CON) 10 mEq tablet, , Disp: , Rfl:     psyllium (Metamucil) 0 52 g capsule, Take 0 52 g by mouth daily, Disp: , Rfl:     saccharomyces boulardii (Florastor) 250 mg capsule, Take 250 mg by mouth 2 (two) times a day, Disp: , Rfl:     senna (SENOKOT) 8 6 MG tablet, Take 8 6 mg by mouth 2 (two) times a day, Disp: , Rfl:     sertraline (ZOLOFT) 25 mg tablet, Take 100 mg by mouth daily, Disp: , Rfl:     sodium phosphate-biphosphate (FLEET) 7-19 g 118 mL enema, Insert 1 enema into the rectum, Disp: , Rfl:     vitamin B-12 (CYANOCOBALAMIN) 250 MCG tablet, Take 500 mcg by mouth daily, Disp: , Rfl:     warfarin (COUMADIN) 6 mg tablet, Take 6 mg by mouth daily, Disp: , Rfl:   Family History   Problem Relation Age of Onset    Diabetes Father     Kidney cancer Father     Alzheimer's disease Mother     Bone cancer Paternal Uncle     Lung cancer Paternal Uncle               Coordination of Care: Supervisor aware of the treatment plan    Patient / Staff education : Staff was given education on sign of infection and pressure ulcer prevention  Staff verbalized understanding     Follow up :  Return in about 2 weeks (around 4/26/2022)  Voice-recognition software may have been used in the preparation of this document  Occasional wrong word or "sound-alike" substitutions may have occurred due to the inherent limitations of voice recognition software  Interpretation should be guided by context        SVETLANA Gonzalez

## 2022-04-12 NOTE — PATIENT INSTRUCTIONS
Orders Placed This Encounter   Procedures    Wound cleansing and dressings     Wound:  Right plantar   Discontinue previous wound order  Cleanse the wound bed with soap and water, pat dry  Apply zinc oxide to wound bed ( open area) and skin prep to purple area  Frequency : daily and prn for soiling     Continue prevalon boots when in bed  Offload all wounds  Turn and reposition frequently, maximum of every two hours  Instruct / Assist with weight shifting every 15 - 20 minutes when in chair  Increase protein intake    Monitor for any sign of infection or worsening, inform PCP or patient's primary physician in your facility     Standing Status:   Future     Standing Expiration Date:   4/12/2023

## 2022-04-12 NOTE — ASSESSMENT & PLAN NOTE
Right plantar  -wound - resolved, the purple area is discoloration and superficial  - skin prep for prevention

## 2022-04-12 NOTE — ASSESSMENT & PLAN NOTE
Right foot plantar  - unknown etiology, discovered - 4/12/222  - wound size is 0 5 x 0 3  X 0 1 cm, 100% epithelial, with no obvious sign of infection  - local wound care with zinc oxide  - followup in 2 weeks

## 2022-04-26 ENCOUNTER — NURSING HOME VISIT (OUTPATIENT)
Dept: WOUND CARE | Facility: HOSPITAL | Age: 72
End: 2022-04-26
Payer: MEDICARE

## 2022-04-26 DIAGNOSIS — T14.8XXA DEEP TISSUE INJURY: ICD-10-CM

## 2022-04-26 DIAGNOSIS — G82.20 PARAPLEGIA FOLLOWING SPINAL CORD INJURY (HCC): Primary | ICD-10-CM

## 2022-04-26 DIAGNOSIS — T14.8XXA OPEN WOUND: ICD-10-CM

## 2022-04-26 PROCEDURE — 99307 SBSQ NF CARE SF MDM 10: CPT | Performed by: NURSE PRACTITIONER

## 2022-04-26 NOTE — ASSESSMENT & PLAN NOTE
Right foot plantar  - unknown etiology, discovered - 4/12/222  - wound -healed  -  continue moisturizing lotion  - sign off

## 2022-04-26 NOTE — LETTER
Patient:  Giovany Joel   1950           VSETLANA Gonzalez saw Giovany Joel for a wound care visit on 4/26/2022  See below for information relating to this visit  Chief Complaint   Patient presents with    Follow Up Wound Care Visit     Right foot plantar        Assessment/Plan:  1  Paraplegia following spinal cord injury [facility])  Assessment & Plan:  dependent with positioning when in bed    Orders:  -     Wound cleansing and dressings; Future    2  Deep tissue injury  Assessment & Plan:  Right plantar  -wound - resolved, the purple area is discoloration and superficial  - continue to offload    Orders:  -     Wound cleansing and dressings; Future    3  Open wound  Assessment & Plan:  Right foot plantar  - unknown etiology, discovered - 4/12/222  - wound -healed  -  continue moisturizing lotion  - sign off             Orders:  Giovany Joel  1950  Orders Placed This Encounter   Procedures    Wound cleansing and dressings     Wound:  Bilateral lower extremity  Discontinue previous wound order  Apply moisturizing lotion to bilateral lower extremity daily   Continue prevalon boots when in bed  Offload all wounds  Turn and reposition frequently, Increase protein intake  Monitor for any sign of infection or worsening, inform PCP or patient's primary physician in your facility     Standing Status:   Future     Standing Expiration Date:   4/26/2023         Follow Up:  Return sign off  [address] hours are 8:00 am - 4:30 pm Monday through Friday  The center phone number is [phone]  You can also contact me directly thru my email at [email or web address]  [email or web address] or thru tiger text  If it is an emergency, please contact the PCP or patient's attending physician in your facility       Sincerely,    Electronically signed by SVETLANA Gonzalez    Patient : Giovany Joel    1950

## 2022-04-26 NOTE — PATIENT INSTRUCTIONS
Orders Placed This Encounter   Procedures    Wound cleansing and dressings     Wound:  Bilateral lower extremity  Discontinue previous wound order  Apply moisturizing lotion to bilateral lower extremity daily   Continue prevalon boots when in bed  Offload all wounds  Turn and reposition frequently, Increase protein intake    Monitor for any sign of infection or worsening, inform PCP or patient's primary physician in your facility     Standing Status:   Future     Standing Expiration Date:   4/26/2023

## 2022-04-26 NOTE — ASSESSMENT & PLAN NOTE
Right plantar  -wound - resolved, the purple area is discoloration and superficial  - continue to offload same as pt

## 2022-04-28 NOTE — PROGRESS NOTES
[address] MANAGEMENT   AND HYPERBARIC MEDICINE CENTER       Patient ID: Deya Chowdary is a 71 y o  female Date of Birth 1950     Location of Service: [facility]    Performed wound round with: supervisor      Chief Complaint   Patient presents with    Follow Up Wound Care Visit     Right foot plantar       Wound Instructions:  Orders Placed This Encounter   Procedures    Wound cleansing and dressings     Wound:  Bilateral lower extremity  Discontinue previous wound order  Apply moisturizing lotion to bilateral lower extremity daily   Continue prevalon boots when in bed  Offload all wounds  Turn and reposition frequently, Increase protein intake  Monitor for any sign of infection or worsening, inform PCP or patient's primary physician in your facility     Standing Status:   Future     Standing Expiration Date:   4/26/2023       Allergies  Latex and Other      Assessment & Plan:  1  Paraplegia following spinal cord injury [facility])  Assessment & Plan:  dependent with positioning when in bed    Orders:  -     Wound cleansing and dressings; Future    2  Deep tissue injury  Assessment & Plan:  Right plantar  -wound - resolved, the purple area is discoloration and superficial  - continue to offload    Orders:  -     Wound cleansing and dressings; Future    3  Open wound  Assessment & Plan:  Right foot plantar  - unknown etiology, discovered - 4/12/222  - wound -healed  -  continue moisturizing lotion  - sign off             Subjective: Followup for wound on the right plantar  Received patient in bed, asleep  No significant issues related to the wound  Facility staff did not report any new wounds  Review of Systems   Reason unable to perform ROS: asleep  Objective: There were no vitals taken for this visit  Physical Exam  HENT:      Head: Normocephalic        Nose: Nose normal    Eyes:      Comments: asleep   Pulmonary:      Effort: Pulmonary effort is normal    Abdominal: Palpations: Abdomen is soft  Genitourinary:     Comments: With angel  Musculoskeletal:      Comments: paraplegia   Skin:     Findings: Lesion present  Comments: Right foot plantar - purple discoloration, superficial    Right foot plantar ( middle) -healed   Neurological:      Mental Status: She is alert  Gait: Gait abnormal    Psychiatric:      Comments: asleep              Procedures           Patient's care was coordinated with nursing facility staff  Recent vitals, labs and updated medications were reviewed on EMR or chart system of facility   Past Medical, surgical, social, medication and allergy history and patient's previous records were reviewed and updated as appropriate:     Patient Active Problem List   Diagnosis    Neurogenic bladder    [facility]omyel[facility]    Depression    [facility]    Heart murmur    HTN (hypertension)    Hyperlipidemia    Hyponatremia    Pressure ulcer of contiguous region involving left buttock and hip, unstageable ([facility] )    Kidney lesion, native, right    Neurogenic bowel    Paralysis ([facility] [facility]    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[facility] [facility] )    Parkinson's disease ([facility]    Postoperative anemia due to acute blood loss    Seizure disorder ([facility] [facility] )    Protein-calorie malnutrition ([facility] [facility] )    Chronic right shoulder pain    CHF (congestive heart failure) ([facility] )    Septic arthritis of hip ([facility] [facility] )    Atrial fibrillation with RVR ([facility] [facility] )    Chronic anticoagulation    Pressure injury of 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 ([facility])    Loose stools    Anemia due to chronic illness    Indigestion    Dehydration    Deep tissue injury    Open wound     Past Medical History:   Diagnosis Date    Back pain     Congest[facility] failure ([facility])     Depressive disorder     Hypertension     Neurogenic bladder     Open wound of leg     Osteoarthritis     Osteomyelitis ([facility]     Paraplegia ([facility] [facility] )     Seizure ([facility] )     Thrombosis     Ulcer of ankle ([facility] )     Urinary retention      Past Surgical History:   Procedure Laterality Date    CYSTOSCOPY      SKIN GRAFT       Social History     Socioeconomic History    Marital status: /Civil Union     Spouse name: None    Number of children: None    Years of education: None    Highest education level: None   Occupational History    None   Tobacco Use    Smoking status: Never Smoker    Smokeless tobacco: Never Used   Substance and Sexual Activity    Alcohol use: No    Drug use: No    Sexual activity: None   Other Topics Concern    None   Social History Narrative    None     Social Determinants of Health     Financial Resource Strain: Not on file   Food Insecurity: Not on file   Transportation Needs: Not on file   Physical Activity: Not on file   Stress: Not on file   Social Connections: Not on file   Intimate Partner Violence: Not on file   Housing Stability: Not on file        Current Outpatient Medications:     acetaminophen (TYLENOL) 325 mg tablet, Take 650 mg by mouth every 4 (four) hours as needed, Disp: , Rfl:     amLODIPine-benazepril (LOTREL) 10-20 MG per capsule, Take by mouth , Disp: , Rfl:     atorvastatin (LIPITOR) 20 mg tablet, Take 20 mg by mouth daily , Disp: , Rfl:     Biotin 10 MG CAPS, Take 10 mg by mouth daily, Disp: , Rfl:     bisacodyl (Dulcolax) 10 mg suppository, Insert 10 mg into the rectum daily, Disp: , Rfl:     carbidopa-levodopa (SINEMET)  mg per tablet, Take 1 tablet by mouth 4 (four) times a day, Disp: , Rfl:     Cranberry 450 MG TABS, Take 450 mg by mouth, Disp: , Rfl:     famotidine (PEPCID) 20 mg tablet, Take 20 mg by mouth 2 (two) times a day, Disp: , Rfl:     furosemide (LASIX) 20 mg tablet, Take 20 mg by mouth daily, Disp: , Rfl:     magnesium hydroxide (MILK OF MAGNESIA) 400 mg/5 mL oral suspension, Take by mouth daily as needed for constipation, Disp: , Rfl:     metoprolol tartrate (LOPRESSOR) 50 mg tablet, Take 50 mg by mouth 2 (two) times a day, Disp: , Rfl:     mirtazapine (REMERON) 15 mg tablet, Take 7 5 mg by mouth daily at bedtime, Disp: , Rfl:     Multiple Vitamin (DAILY VALUE MULTIVITAMIN) TABS, Take 1 tablet by mouth daily , Disp: , Rfl:     Multiple Vitamins-Minerals (HAIR SKIN NAILS PO), Take 3 tablets by mouth daily, Disp: , Rfl:     Nutritional Supplements (Micha) PACK, Take by mouth, Disp: , Rfl:     Omega-3 Fatty Acids (FISH OIL) 1,000 mg, Take 1,000 mg by mouth daily , Disp: , Rfl:     oxyCODONE (ROXICODONE) 5 mg immediate release tablet, Take 1 tablet (5 mg total) by mouth every 6 (six) hours as needed for moderate painMax Daily Amount: 20 mg, Disp: 15 tablet, Rfl: 0    phenytoin (DILANTIN) 100 mg ER capsule, Take 100 mg by mouth every 12 (twelve) hours , Disp: , Rfl:     potassium chloride (K-DUR,KLOR-CON) 10 mEq tablet, , Disp: , Rfl:     psyllium (Metamucil) 0 52 g capsule, Take 0 52 g by mouth daily, Disp: , Rfl:     saccharomyces boulardii (Florastor) 250 mg capsule, Take 250 mg by mouth 2 (two) times a day, Disp: , Rfl:     senna (SENOKOT) 8 6 MG tablet, Take 8 6 mg by mouth 2 (two) times a day, Disp: , Rfl:     sertraline (ZOLOFT) 25 mg tablet, Take 100 mg by mouth daily, Disp: , Rfl:     sodium phosphate-biphosphate (FLEET) 7-19 g 118 mL enema, Insert 1 enema into the rectum, Disp: , Rfl:     vitamin B-12 (CYANOCOBALAMIN) 250 MCG tablet, Take 500 mcg by mouth daily, Disp: , Rfl:     warfarin (COUMADIN) 6 mg tablet, Take 6 mg by mouth daily, Disp: , Rfl:   Family History   Problem Relation Age of Onset    Diabetes Father     Kidney cancer Father     Alzheimer's disease Mother     Bone cancer Paternal Uncle     Lung cancer Paternal Uncle               Coordination of Care: Supervisor aware of the treatment plan    Patient / Staff education : Staff was given education on sign of infection and pressure ulcer prevention  Staff verbalized understanding     Follow up :  Return sign off      Voice-recognition software may have been used in the preparation of this document  Occasional wrong word or "sound-alike" substitutions may have occurred due to the inherent limitations of voice recognition software  Interpretation should be guided by context        SVETLANA Jj

## 2022-07-26 ENCOUNTER — NURSING HOME VISIT (OUTPATIENT)
Dept: WOUND CARE | Facility: HOSPITAL | Age: 72
End: 2022-07-26
Payer: MEDICARE

## 2022-07-26 DIAGNOSIS — I83.009 VENOUS ULCER (HCC): Primary | ICD-10-CM

## 2022-07-26 DIAGNOSIS — L89.890 PRESSURE INJURY OF LEFT FOOT, UNSTAGEABLE (HCC): ICD-10-CM

## 2022-07-26 DIAGNOSIS — Z98.890 S/P EXPLORATORY LAPAROTOMY: ICD-10-CM

## 2022-07-26 DIAGNOSIS — L97.909 VENOUS ULCER (HCC): Primary | ICD-10-CM

## 2022-07-26 PROBLEM — L89.620 PRESSURE INJURY OF LEFT HEEL, UNSTAGEABLE (HCC): Status: ACTIVE | Noted: 2022-07-26

## 2022-07-26 PROBLEM — L89.892 PRESSURE INJURY OF LEFT FOOT, STAGE 2 (HCC): Status: RESOLVED | Noted: 2022-07-26 | Resolved: 2022-07-26

## 2022-07-26 PROBLEM — L89.892 PRESSURE INJURY OF LEFT FOOT, STAGE 2 (HCC): Status: ACTIVE | Noted: 2022-07-26

## 2022-07-26 PROCEDURE — 99309 SBSQ NF CARE MODERATE MDM 30: CPT | Performed by: NURSE PRACTITIONER

## 2022-07-27 NOTE — ASSESSMENT & PLAN NOTE
S/p ex lap, sigmoidectomy on 7/2/2022  - incision stapled, intact, with no obvious sign of infection  - JORJE  - continue to follow up with surgeon

## 2022-07-27 NOTE — ASSESSMENT & PLAN NOTE
Left heel and left plantar  - the wound in left plantar is covered with slough   The wound on the left heel is covered with eschar  - calcium alginate for left plantar and skin prep for left heel  - continue to offload  - follow up next week

## 2022-07-27 NOTE — ASSESSMENT & PLAN NOTE
Right foot dorsal  - partial thickness, with no obvious sign of infection  - local wound care with silver alginate  - offload  - follow up next week

## 2022-07-27 NOTE — ASSESSMENT & PLAN NOTE
Right foot dorsal  - 100% epithelial, with no obvious sign of infection  - local wound care with calcium alginate  - offload

## 2022-07-27 NOTE — PROGRESS NOTES
[address] MANAGEMENT   AND HYPERBARIC MEDICINE CENTER       Patient ID: Louis Costello is a 71 y o  female Date of Birth 1950     [facility]     Performed wound round with: Wound team     Chief Complaint : Abdomen, Right foot dorsal, Left foot plantar, Left heel, Neck,     Wound Instructions:  Wound:  Left foot plantar and Right foot dorsal  Discontinue previous wound order  Cleanse the wound bed with NSS   Apply non-sting skin prep to periwound area  Apply oil emulsion and maxorb  to wound bed, then cover with ABD and kerlix  Frequency : daily and prn for soiling     Wound : Left heel  Cleanse the wound bed with NSS  Apply skin prep to periwound area and wound bed  Daily and prn for soiling    Neck  Cleanse with soap and water, pat dry  Apply antifungal powder to rashes  Twice a day and prn for soiling    Abdomen  Leave JORJE  Monitor for any infection or drainage    Offload all wounds  Turn and reposition frequently  Increase protein intake  Monitor for any sign of infection or worsening, inform PCP or patient's primary physician in your facility  Allergies  Latex and Other      Assessment & Plan:  1  Venous ulcer ([address] )  Assessment & Plan:  Right foot dorsal  - partial thickness, with no obvious sign of infection  - local wound care with silver alginate  - offload  - follow up next week      2  Pressure injury of left foot, unstageable ([address] )  Assessment & Plan:  Left heel and left plantar  - the wound in left plantar is covered with slough  The wound on the left heel is covered with eschar  - calcium alginate for left plantar and skin prep for left heel  - continue to offload  - follow up next week      3  S/P exploratory laparotomy  Assessment & Plan:  S/p ex lap, sigmoidectomy on 7/2/2022  - incision stapled, intact, with no obvious sign of infection  - JORJE  - continue to follow up with surgeon             Subjective:    This is a 71 y o , female referred to our service for wound/ skin alterations on abdomen, left and right foot and neck  Patient have a complex medical history including but not limited to  has a past medical history of Back pain, Congestive heart failure ([facility] [address] ), Depressive disorder, Hypertension, Neurogenic bladder, Open wound of leg, Osteoarthritis, Osteomyelitis ([facility] [address] ), Paraplegia ([facility] [address] ), Seizure ([facility] [address] ), Thrombosis, Ulcer of ankle ([facility] [address] ), and Urinary retention    Patient was referred by Senior Care Team  Patient was seen in collaboration with the facility wound team      Wound History: All the wounds was seen by [facility] wound team  The etiology on the right dorsal foot is venous ulcer  The wound on the left foot is pressure ulcer  The wound on the neck is MASD  The wound on the abdomen is surgical incision  Patient hd ex lap, sigmoidelectomy for sigmoid volvolus for ischemia on 7/2/ 2022  Received patient in bed, asleep  No significant issues related to the wound  Review of Systems   Reason unable to perform ROS: asleep  Objective:    Physical Exam  Constitutional:       Appearance: Normal appearance  HENT:      Head: Normocephalic and atraumatic  Nose: Nose normal       Mouth/Throat:      Pharynx: Oropharynx is clear  Eyes:      Conjunctiva/sclera: Conjunctivae normal    Pulmonary:      Effort: Pulmonary effort is normal    Abdominal:      Tenderness: There is no abdominal tenderness  There is no guarding  Genitourinary:     Comments: With indwelling catheter  Musculoskeletal:         General: No tenderness  Cervical back: Normal range of motion  Right lower leg: No edema  Left lower leg: No edema  Comments: + paraplegia  With moderate amount of edema on BLE   Skin:     Findings: Lesion present        Comments: Neck - + diffused rashes, with no obvious sign of infection    Abdomen incision - with 27 staples, intact, no drainage, approximated, with no obvious sign of infection    Right dorsal foot - wound size is 1 5 x 1 x 0 1 cm, 100% epithelial, small amount so serous drainage, with no obvious sign of infection    Left foot plantar - wound size is 1 x 0 3 x 0 1 cm, 100% slough, no drainage, periwound is normal, with no obvious sign of infection    Left heel - wound size is 2 x 1 4 x 0 1 cm, 100% eschar, stable, with no obvious sign of infection   Neurological:      Mental Status: She is alert  Gait: Gait abnormal    Psychiatric:         Mood and Affect: Mood normal          Behavior: Behavior normal               Procedures           Patient's care was coordinated with nursing facility staff  Recent vitals, labs and updated medications were reviewed on EMR or chart system of facility  Past Medical, surgical, social, medication and allergy history and patient's previous records were reviewed and updated as appropriate: Most up-to date information is available in the facility EMR where the patient is currently admitted      Patient Active Problem List   Diagnosis    Neurogenic bladder    [facility] osteomyel[facility] [address] [facility]    Depression    [facility]    Heart murmur    HTN (hypertension)    Hyperlipidemia    Hyponatremia    Pressure ulcer of contiguous region involving left buttock and hip, unstageable ([address] )    Kidney lesion, native, right    Neurogenic bowel    Paralysis ([address] )    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[address] )    Parkinson's disease ([address] )    Postoperative anemia due to acute blood loss    Seizure disorder ([facility]    Protein-calorie malnutrition ([facility])    Chronic right shoulder pain    CHF (congestive heart failure) ([facility]    Septic arthritis of hip ([facility])    Atrial fibrillation with RVR ([facility]    Chronic anticoagulation    Pressure injury of 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 ([facility])    Loose stools    Anemia due to chronic illness    Indigestion    Dehydration    Deep tissue injury    Open wound    Venous ulcer ([address] )    Pressure injury of left foot, unstageable ([address] )    S/P exploratory laparotomy     Past Medical History:   Diagnosis Date    Back pain     Congestive heart failure (HCC)     Depressive disorder     Hypertension     Neurogenic bladder     Open wound of leg     Osteoarthritis     Osteomyelitis ([facility] [address] [facility]     Paraplegia (HCC)     [facility]     Thrombosis     Ulcer of ankle ([facility] )     Urinary retention      Past Surgical History:   Procedure Laterality Date    CYSTOSCOPY      SKIN GRAFT       Social History     Socioeconomic History    Marital status: /Civil Union     Spouse name: None    Number of children: None    Years of education: None    Highest education level: None   Occupational History    None   Tobacco Use    Smoking status: Never Smoker    Smokeless tobacco: Never Used   Substance and Sexual Activity    Alcohol use: No    Drug use: No    Sexual activity: None   Other Topics Concern    None   Social History Narrative    None     Social Determinants of Health     Financial Resource Strain: Not on file   Food Insecurity: Not on file   Transportation Needs: Not on file   Physical Activity: Not on file   Stress: Not on file   Social Connections: Not on file   Intimate Partner Violence: Not on file   Housing Stability: Not on file        Current Outpatient Medications:     acetaminophen (TYLENOL) 325 mg tablet, Take 650 mg by mouth every 4 (four) hours as needed, Disp: , Rfl:     amLODIPine-benazepril (LOTREL) 10-20 MG per capsule, Take by mouth , Disp: , Rfl:     atorvastatin (LIPITOR) 20 mg tablet, Take 20 mg by mouth daily , Disp: , Rfl:     Biotin 10 MG CAPS, Take 10 mg by mouth daily, Disp: , Rfl:     bisacodyl (Dulcolax) 10 mg suppository, Insert 10 mg into the rectum daily, Disp: , Rfl:     carbidopa-levodopa (SINEMET)  mg per tablet, Take 1 tablet by mouth 4 (four) times a day, Disp: , Rfl:     Cranberry 450 MG TABS, Take 450 mg by mouth, Disp: , Rfl:     famotidine (PEPCID) 20 mg tablet, Take 20 mg by mouth 2 (two) times a day, Disp: , Rfl:     furosemide (LASIX) 20 mg tablet, Take 20 mg by mouth daily, Disp: , Rfl:     magnesium hydroxide (MILK OF MAGNESIA) 400 mg/5 mL oral suspension, Take by mouth daily as needed for constipation, Disp: , Rfl:     metoprolol tartrate (LOPRESSOR) 50 mg tablet, Take 50 mg by mouth 2 (two) times a day, Disp: , Rfl:     mirtazapine (REMERON) 15 mg tablet, Take 7 5 mg by mouth daily at bedtime, Disp: , Rfl:     Multiple Vitamin (DAILY VALUE MULTIVITAMIN) TABS, Take 1 tablet by mouth daily , Disp: , Rfl:     Multiple Vitamins-Minerals (HAIR SKIN NAILS PO), Take 3 tablets by mouth daily, Disp: , Rfl:     Nutritional Supplements (Micha) PACK, Take by mouth, Disp: , Rfl:     Omega-3 Fatty Acids (FISH OIL) 1,000 mg, Take 1,000 mg by mouth daily , Disp: , Rfl:     oxyCODONE (ROXICODONE) 5 mg immediate release tablet, Take 1 tablet (5 mg total) by mouth every 6 (six) hours as needed for moderate painMax Daily Amount: 20 mg, Disp: 15 tablet, Rfl: 0    phenytoin (DILANTIN) 100 mg ER capsule, Take 100 mg by mouth every 12 (twelve) hours , Disp: , Rfl:     potassium chloride (K-DUR,KLOR-CON) 10 mEq tablet, , Disp: , Rfl:     psyllium (Metamucil) 0 52 g capsule, Take 0 52 g by mouth daily, Disp: , Rfl:     saccharomyces boulardii (Florastor) 250 mg capsule, Take 250 mg by mouth 2 (two) times a day, Disp: , Rfl:     senna (SENOKOT) 8 6 MG tablet, Take 8 6 mg by mouth 2 (two) times a day, Disp: , Rfl:     sertraline (ZOLOFT) 25 mg tablet, Take 100 mg by mouth daily, Disp: , Rfl:     sodium phosphate-biphosphate (FLEET) 7-19 g 118 mL enema, Insert 1 enema into the rectum, Disp: , Rfl:     vitamin B-12 (CYANOCOBALAMIN) 250 MCG tablet, Take 500 mcg by mouth daily, Disp: , Rfl:     warfarin (COUMADIN) 6 mg tablet, Take 6 mg by mouth daily, Disp: , Rfl:   Family History   Problem Relation Age of Onset    Diabetes Father     Kidney cancer Father     Alzheimer's disease Mother     Bone cancer Paternal Uncle     Lung cancer Paternal Uncle               Coordination of Care: Wound team aware of the treatment plan  Facility nurse will provide wound treatment and monitor the wound for any changes  Patient / Staff education : Staff was given education on sign of infection and pressure ulcer prevention  Staff verbalized understanding     Follow up :  Next week    Voice-recognition software may have been used in the preparation of this document  Occasional wrong word or "sound-alike" substitutions may have occurred due to the inherent limitations of voice recognition software  Interpretation should be guided by context        SVETLANA Dhillon

## 2022-08-02 ENCOUNTER — NURSING HOME VISIT (OUTPATIENT)
Dept: WOUND CARE | Facility: HOSPITAL | Age: 72
End: 2022-08-02
Payer: MEDICARE

## 2022-08-02 DIAGNOSIS — L97.909 VENOUS ULCER (HCC): Primary | ICD-10-CM

## 2022-08-02 DIAGNOSIS — L89.890 PRESSURE INJURY OF LEFT FOOT, UNSTAGEABLE (HCC): ICD-10-CM

## 2022-08-02 DIAGNOSIS — I83.009 VENOUS ULCER (HCC): Primary | ICD-10-CM

## 2022-08-02 DIAGNOSIS — Z98.890 S/P EXPLORATORY LAPAROTOMY: ICD-10-CM

## 2022-08-02 PROCEDURE — 99308 SBSQ NF CARE LOW MDM 20: CPT | Performed by: NURSE PRACTITIONER

## 2022-08-02 NOTE — ASSESSMENT & PLAN NOTE
S/p ex lap, sigmoidectomy on 7/2/2022  - with dehisced area near the navel, discovered today 8/2/2022, about 2 x 1 5 x 1 5 cm, 100% granulation with no obvious sign of infection  - ordered packing strip  - Order to inform surgeon and schedule appointment  - follow up next week

## 2022-08-02 NOTE — PROGRESS NOTES
[address] MANAGEMENT   [facility]       Patient ID: Reid Wooten is a 71 y o  female Date of Birth 1950     Location of Service: [facility]     Performed wound round with: Wound team     Chief Complaint : Abdomen, Right foot dorsal, Left foot plantar, Left heel, Neck,     Wound Instructions:  Wound:  Left foot plantar and Right foot dorsal, Left heel  Discontinue previous wound order  Moisturizing lotion to BLE daily    Neck  Cleanse with soap and water, pat dry  Apply antifungal powder to rashes  Twice a day and prn for soiling    Abdomen  Cleanse the wound bed with NSS  Apply skin prep to periwound area  Apply packing strip to wound cavity and cover with bordered foam  Daily and prn for soiling  Inform the surgeon and schedule an appointment     Offload all wounds  Turn and reposition frequently  Increase protein intake  Monitor for any sign of infection or worsening, inform PCP or patient's primary physician in your facility  Allergies  Latex and Other      Assessment & Plan:  1  Venous ulcer ([address] )  Assessment & Plan:  Right foot dorsal  - healed  - offload        2  Pressure injury of left foot, unstageable ([address] )  Assessment & Plan:  Left heel and left plantar  - healed  Continue to offload      3  S/P exploratory laparotomy  Assessment & Plan:  S/p ex lap, sigmoidectomy on 7/2/2022  - with dehisced area near the navel, discovered today 8/2/2022, about 2 x 1 5 x 1 5 cm, 100% granulation with no obvious sign of infection  - ordered packing strip  - Order to inform surgeon and schedule appointment  - follow up next week             Subjective: This is a 71 y o , female referred to our service for wound/ skin alterations on abdomen, left and right foot and neck  Patient have a complex medical history including but not limited to  has a past medical history of Back pain, Congestive heart failure ([address] ), Depressive disorder, Hypertension, Neurogenic bladder, Open wound of leg, Osteoarthritis, Osteomyelitis ([address] ), Paraplegia ([address] ), Seizure ([address] ), Thrombosis, Ulcer of ankle ([address] ), and Urinary retention    Patient was referred by Senior Care Team  Patient was seen in collaboration with the facility wound team      Wound History: All the wounds was seen by [facility] wound team  The etiology on the right dorsal foot is venous ulcer  The wound on the left foot is pressure ulcer  The wound on the neck is MASD  The wound on the abdomen is surgical incision  Patient hd ex lap, sigmoidelectomy for sigmoid volvolus for ischemia on 7/2/ 2022  Received patient in bed, asleep  No significant issues related to the wound  8/2/2022 Follow up for wound on the abdomen and right and left foot  Received patient, not in distress  Facility staff did not report any significant issues related to the wound  Review of Systems   Reason unable to perform ROS: asleep  Objective:    Physical Exam  Constitutional:       Appearance: Normal appearance  HENT:      Head: Normocephalic and atraumatic  Nose: Nose normal       Mouth/Throat:      Pharynx: Oropharynx is clear  Eyes:      Conjunctiva/sclera: Conjunctivae normal    Pulmonary:      Effort: Pulmonary effort is normal    Abdominal:      Tenderness: There is no abdominal tenderness  There is no guarding  Genitourinary:     Comments: With indwelling catheter  Musculoskeletal:         General: No tenderness  Cervical back: Normal range of motion  Right lower leg: No edema  Left lower leg: No edema  Comments: + paraplegia  With moderate amount of edema on BLE   Skin:     Findings: Lesion present        Comments: Neck - + diffused rashes, with no obvious sign of infection    Abdomen incision - surgical incision stapled with dehisced area near the navel, about 2 x 1 5 x 1 5 cm, 100 % granulation with moderate amount of serous drainage,  with no obvious sign of infection    Right dorsal foot - healed  Left foot plantar - healed  Left heel - healed   Neurological:      Mental Status: She is alert  Gait: Gait abnormal    Psychiatric:         Mood and Affect: Mood normal          Behavior: Behavior normal               Procedures           Patient's care was coordinated with nursing facility staff  Recent vitals, labs and updated medications were reviewed on EMR or chart system of facility  Past Medical, surgical, social, medication and allergy history and patient's previous records were reviewed and updated as appropriate: Most up-to date information is available in the facility EMR where the patient is currently admitted      Patient Active Problem List   Diagnosis    Neurogenic bladder    [facility]    Depression    D[facility] venous thrombosis) [facility]    Heart murmur    HTN (hypertension)    Hyperlipidemia    Hyponatremia    Pressure ulcer of contiguous region involving left buttock and hip, unstageable ([address] )    Kidney lesion, native, right    Neurogenic bowel    Paralysis ([address] )    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[address] )    Parkinson's disease ([address] )    Postoperative anemia due to acute blood loss    Seizure disorder ([facility])    Protein-calorie malnutrition ([facility])    Chronic right shoulder pain    CHF (congestive heart failure) ([facility])    Septic arthritis of hip ([facility])    Atrial fibrillation with RVR ([facility])    Chronic anticoagulation    Pressure injury of 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 ([facility])    Loose stools    Anemia due to chronic illness    Indigestion    Dehydration    Deep tissue injury    Open wound    Venous ulcer ([address] )    Pressure injury of left foot, unstageable ([address] )    S/P exploratory laparotomy     Past Medical History:   Diagnosis Date    Back pain     Congestive heart failure ([facility])     Depressive disorder     Hypertension     Neurogenic bladder     Open wound of leg     Osteoarthritis     Osteomyelitis ([address] )     Paraplegia ([address] )     Seizure ([facility] )     Thrombosis     Ulcer of ankle ([facility] )     Urinary retention      Past Surgical History:   Procedure Laterality Date    CYSTOSCOPY      SKIN GRAFT       Social History     Socioeconomic History    Marital status: /Civil Union     Spouse name: None    Number of children: None    Years of education: None    Highest education level: None   Occupational History    None   Tobacco Use    Smoking status: Never Smoker    Smokeless tobacco: Never Used   Substance and Sexual Activity    Alcohol use: No    Drug use: No    Sexual activity: None   Other Topics Concern    None   Social History Narrative    None     Social Determinants of Health     Financial Resource Strain: Not on file   Food Insecurity: Not on file   Transportation Needs: Not on file   Physical Activity: Not on file   Stress: Not on file   Social Connections: Not on file   Intimate Partner Violence: Not on file   Housing Stability: Not on file        Current Outpatient Medications:     acetaminophen (TYLENOL) 325 mg tablet, Take 650 mg by mouth every 4 (four) hours as needed, Disp: , Rfl:     amLODIPine-benazepril (LOTREL) 10-20 MG per capsule, Take by mouth , Disp: , Rfl:     atorvastatin (LIPITOR) 20 mg tablet, Take 20 mg by mouth daily , Disp: , Rfl:     Biotin 10 MG CAPS, Take 10 mg by mouth daily, Disp: , Rfl:     bisacodyl (Dulcolax) 10 mg suppository, Insert 10 mg into the rectum daily, Disp: , Rfl:     carbidopa-levodopa (SINEMET)  mg per tablet, Take 1 tablet by mouth 4 (four) times a day, Disp: , Rfl:     Cranberry 450 MG TABS, Take 450 mg by mouth, Disp: , Rfl:     famotidine (PEPCID) 20 mg tablet, Take 20 mg by mouth 2 (two) times a day, Disp: , Rfl:     furosemide (LASIX) 20 mg tablet, Take 20 mg by mouth daily, Disp: , Rfl:     magnesium hydroxide (MILK OF MAGNESIA) 400 mg/5 mL oral suspension, Take by mouth daily as needed for constipation, Disp: , Rfl:     metoprolol tartrate (LOPRESSOR) 50 mg tablet, Take 50 mg by mouth 2 (two) times a day, Disp: , Rfl:     mirtazapine (REMERON) 15 mg tablet, Take 7 5 mg by mouth daily at bedtime, Disp: , Rfl:     Multiple Vitamin (DAILY VALUE MULTIVITAMIN) TABS, Take 1 tablet by mouth daily , Disp: , Rfl:     Multiple Vitamins-Minerals (HAIR SKIN NAILS PO), Take 3 tablets by mouth daily, Disp: , Rfl:     Nutritional Supplements (Micha) PACK, Take by mouth, Disp: , Rfl:     Omega-3 Fatty Acids (FISH OIL) 1,000 mg, Take 1,000 mg by mouth daily , Disp: , Rfl:     oxyCODONE (ROXICODONE) 5 mg immediate release tablet, Take 1 tablet (5 mg total) by mouth every 6 (six) hours as needed for moderate painMax Daily Amount: 20 mg, Disp: 15 tablet, Rfl: 0    phenytoin (DILANTIN) 100 mg ER capsule, Take 100 mg by mouth every 12 (twelve) hours , Disp: , Rfl:     potassium chloride (K-DUR,KLOR-CON) 10 mEq tablet, , Disp: , Rfl:     psyllium (Metamucil) 0 52 g capsule, Take 0 52 g by mouth daily, Disp: , Rfl:     saccharomyces boulardii (Florastor) 250 mg capsule, Take 250 mg by mouth 2 (two) times a day, Disp: , Rfl:     senna (SENOKOT) 8 6 MG tablet, Take 8 6 mg by mouth 2 (two) times a day, Disp: , Rfl:     sertraline (ZOLOFT) 25 mg tablet, Take 100 mg by mouth daily, Disp: , Rfl:     sodium phosphate-biphosphate (FLEET) 7-19 g 118 mL enema, Insert 1 enema into the rectum, Disp: , Rfl:     vitamin B-12 (CYANOCOBALAMIN) 250 MCG tablet, Take 500 mcg by mouth daily, Disp: , Rfl:     warfarin (COUMADIN) 6 mg tablet, Take 6 mg by mouth daily, Disp: , Rfl:   Family History   Problem Relation Age of Onset    Diabetes Father     Kidney cancer Father     Alzheimer's disease Mother     Bone cancer Paternal Uncle     Lung cancer Paternal Uncle               Coordination of Care: Wound team aware of the treatment plan  Facility nurse will provide wound treatment and monitor the wound for any changes       Patient / Staff education : Staff was given education on sign of infection and pressure ulcer prevention  Staff verbalized understanding     Follow up :  Next week    Voice-recognition software may have been used in the preparation of this document  Occasional wrong word or "sound-alike" substitutions may have occurred due to the inherent limitations of voice recognition software  Interpretation should be guided by context        SVETLANA Acuña

## 2022-08-09 ENCOUNTER — NURSING HOME VISIT (OUTPATIENT)
Dept: WOUND CARE | Facility: HOSPITAL | Age: 72
End: 2022-08-09
Payer: MEDICARE

## 2022-08-09 DIAGNOSIS — Z98.890 S/P EXPLORATORY LAPAROTOMY: Primary | ICD-10-CM

## 2022-08-09 DIAGNOSIS — G82.20 PARAPLEGIA FOLLOWING SPINAL CORD INJURY (HCC): ICD-10-CM

## 2022-08-09 PROCEDURE — 99308 SBSQ NF CARE LOW MDM 20: CPT | Performed by: NURSE PRACTITIONER

## 2022-08-09 NOTE — PROGRESS NOTES
[address] MANAGEMENT   [facility]       Patient ID: Sumi Marrero is a 71 y o  female Date of Birth 1950     Location of Service: [facility]    Performed wound round with: Wound team     Chief Complaint : Abdomen    Wound Instructions:  Wound:  Left foot plantar and Right foot dorsal, Left heel  Discontinue previous wound order  Moisturizing lotion to BLE daily    Abdomen  Cleanse the wound bed with NSS  Apply skin prep to periwound area  Apply packing strip to wound cavity and cover with bordered foam  Daily and prn for soiling      Offload all wounds  Turn and reposition frequently  Increase protein intake  Monitor for any sign of infection or worsening, inform PCP or patient's primary physician in your facility  Allergies  Latex and Other      Assessment & Plan:  1  S/P exploratory laparotomy  Assessment & Plan:  S/p ex lap, sigmoidectomy on 7/2/2022  - with dehisced area near the navel, wound increased, 4 5 x 1 5 x 1 5 compared to last week measurement of  2 x 1 5 x 1 5 cm, 100% granulation with no obvious sign of infection  -continue packing strip  - was seen by surgeon and there is no new order  - follow up next week      2  Paraplegia following spinal cord injury [facility])  Assessment & Plan:  dependent with positioning when in bed             Subjective: This is a 71 y o , female referred to our service for wound/ skin alterations on abdomen, left and right foot and neck  Patient have a complex medical history including but not limited to  has a past medical history of Back pain, Congestive heart failure ([address] ), Depressive disorder, Hypertension, Neurogenic bladder, Open wound of leg, Osteoarthritis, Osteomyelitis ([address] ), Paraplegia ([address] ), Seizure ([address] ), Thrombosis, Ulcer of ankle ([address] ), and Urinary retention    Patient was referred by Senior Care Team  Patient was seen in collaboration with the facility wound team      Wound History:    All the wounds was seen by [facility] wound team  The etiology on the right dorsal foot is venous ulcer  The wound on the left foot is pressure ulcer  The wound on the neck is MASD  The wound on the abdomen is surgical incision  Patient hd ex lap, sigmoidelectomy for sigmoid volvolus for ischemia on 7/2/ 2022  Received patient in bed, asleep  No significant issues related to the wound  8/2/2022 Follow up for wound on the abdomen and right and left foot  Received patient, not in distress  Facility staff did not report any significant issues related to the wound  8/9/2022 Follow up for wound on the abdomen   Received patient, not in distress  Facility staff did not report any significant issues related to the wound  As per report, the patient was seen by surgeon and there is new order  Review of Systems   Reason unable to perform ROS: asleep  Objective:    Physical Exam  Constitutional:       Appearance: Normal appearance  HENT:      Head: Normocephalic and atraumatic  Nose: Nose normal       Mouth/Throat:      Pharynx: Oropharynx is clear  Eyes:      Conjunctiva/sclera: Conjunctivae normal    Pulmonary:      Effort: Pulmonary effort is normal    Abdominal:      Tenderness: There is no abdominal tenderness  There is no guarding  Genitourinary:     Comments: With indwelling catheter  Musculoskeletal:         General: No tenderness  Cervical back: Normal range of motion  Right lower leg: No edema  Left lower leg: No edema  Comments: + paraplegia  With moderate amount of edema on BLE   Skin:     Findings: Lesion present  Comments:   Abdomen incision ( dehisced) - 4 5 x 1 5 x 1 5 cm ,  100 % granulation with moderate amount of serous drainage,  with no obvious sign of infection, no malodor     Neurological:      Mental Status: She is alert        Gait: Gait abnormal    Psychiatric:         Mood and Affect: Mood normal          Behavior: Behavior normal               Procedures Patient's care was coordinated with nursing facility staff  Recent vitals, labs and updated medications were reviewed on EMR or chart system of facility  Past Medical, surgical, social, medication and allergy history and patient's previous records were reviewed and updated as appropriate: Most up-to date information is available in the facility EMR where the patient is currently admitted      Patient Active Problem List   Diagnosis    Neurogenic bladder    [facility] ([facility])    Depression    DVT (deep venous thrombosis) ([facility])    Heart murmur    HTN (hypertension)    Hyperlipidemia    Hyponatremia    Pressure ulcer of contiguous region involving left buttock and hip, unstageable ([address] )    Kidney lesion, native, right    Neurogenic bowel    Paralysis ([address] )    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[address] )    Parkinson's disease ([address] )    Postoperative anemia due to acute blood loss    Seizure disorder ([facility])    Protein-calorie malnutrition ([facility])    Chronic right shoulder pain    CHF (congestive heart failure) ([facility])    Septic arthritis of hip ([facility])    Atrial fibrillation with RVR ([facility])    Chronic anticoagulation    Pressure injury of 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 ([facility])    Loose stools    Anemia due to chronic illness    Indigestion    Dehydration    Deep tissue injury    Open wound    Venous ulcer ([address] )    Pressure injury of left foot, unstageable ([address] )    S/P exploratory laparotomy     Past Medical History:   Diagnosis Date    Back pain     Congestive heart failure ([facility])     Depressive disorder     Hypertension     Neurogenic bladder     Open wound of leg     Osteoarthritis     Osteomyelitis ([address] )     Paraplegia ([address] )     Seizure ([address] )     Thrombosis     Ulcer of ankle ([address] )     Urinary retention      Past Surgical History:   Procedure Laterality Date    CYSTOSCOPY      SKIN GRAFT       Social History     Socioeconomic History    Marital status: /Civil Union Spouse name: Not on file    Number of children: Not on file    Years of education: Not on file    Highest education level: Not on file   Occupational History    Not on file   Tobacco Use    Smoking status: Never Smoker    Smokeless tobacco: Never Used   Substance and Sexual Activity    Alcohol use: No    Drug use: No    Sexual activity: Not on file   Other Topics Concern    Not on file   Social History Narrative    Not on file     Social Determinants of Health     Financial Resource Strain: Not on file   Food Insecurity: Not on file   Transportation Needs: Not on file   Physical Activity: Not on file   Stress: Not on file   Social Connections: Not on file   Intimate Partner Violence: Not on file   Housing Stability: Not on file        Current Outpatient Medications:     acetaminophen (TYLENOL) 325 mg tablet, Take 650 mg by mouth every 4 (four) hours as needed, Disp: , Rfl:     amLODIPine-benazepril (LOTREL) 10-20 MG per capsule, Take by mouth , Disp: , Rfl:     atorvastatin (LIPITOR) 20 mg tablet, Take 20 mg by mouth daily , Disp: , Rfl:     Biotin 10 MG CAPS, Take 10 mg by mouth daily, Disp: , Rfl:     bisacodyl (Dulcolax) 10 mg suppository, Insert 10 mg into the rectum daily, Disp: , Rfl:     carbidopa-levodopa (SINEMET)  mg per tablet, Take 1 tablet by mouth 4 (four) times a day, Disp: , Rfl:     Cranberry 450 MG TABS, Take 450 mg by mouth, Disp: , Rfl:     famotidine (PEPCID) 20 mg tablet, Take 20 mg by mouth 2 (two) times a day, Disp: , Rfl:     furosemide (LASIX) 20 mg tablet, Take 20 mg by mouth daily, Disp: , Rfl:     magnesium hydroxide (MILK OF MAGNESIA) 400 mg/5 mL oral suspension, Take by mouth daily as needed for constipation, Disp: , Rfl:     metoprolol tartrate (LOPRESSOR) 50 mg tablet, Take 50 mg by mouth 2 (two) times a day, Disp: , Rfl:     mirtazapine (REMERON) 15 mg tablet, Take 7 5 mg by mouth daily at bedtime, Disp: , Rfl:     Multiple Vitamin (DAILY VALUE MULTIVITAMIN) TABS, Take 1 tablet by mouth daily , Disp: , Rfl:     Multiple Vitamins-Minerals (HAIR SKIN NAILS PO), Take 3 tablets by mouth daily, Disp: , Rfl:     Nutritional Supplements (Micha) PACK, Take by mouth, Disp: , Rfl:     Omega-3 Fatty Acids (FISH OIL) 1,000 mg, Take 1,000 mg by mouth daily , Disp: , Rfl:     oxyCODONE (ROXICODONE) 5 mg immediate release tablet, Take 1 tablet (5 mg total) by mouth every 6 (six) hours as needed for moderate painMax Daily Amount: 20 mg, Disp: 15 tablet, Rfl: 0    phenytoin (DILANTIN) 100 mg ER capsule, Take 100 mg by mouth every 12 (twelve) hours , Disp: , Rfl:     potassium chloride (K-DUR,KLOR-CON) 10 mEq tablet, , Disp: , Rfl:     psyllium (Metamucil) 0 52 g capsule, Take 0 52 g by mouth daily, Disp: , Rfl:     saccharomyces boulardii (Florastor) 250 mg capsule, Take 250 mg by mouth 2 (two) times a day, Disp: , Rfl:     senna (SENOKOT) 8 6 MG tablet, Take 8 6 mg by mouth 2 (two) times a day, Disp: , Rfl:     sertraline (ZOLOFT) 25 mg tablet, Take 100 mg by mouth daily, Disp: , Rfl:     sodium phosphate-biphosphate (FLEET) 7-19 g 118 mL enema, Insert 1 enema into the rectum, Disp: , Rfl:     vitamin B-12 (CYANOCOBALAMIN) 250 MCG tablet, Take 500 mcg by mouth daily, Disp: , Rfl:     warfarin (COUMADIN) 6 mg tablet, Take 6 mg by mouth daily, Disp: , Rfl:   Family History   Problem Relation Age of Onset    Diabetes Father     Kidney cancer Father     Alzheimer's disease Mother     Bone cancer Paternal Uncle     Lung cancer Paternal Uncle               Coordination of Care: Wound team aware of the treatment plan  Facility nurse will provide wound treatment and monitor the wound for any changes  Patient / Staff education : Staff was given education on sign of infection and pressure ulcer prevention  Staff verbalized understanding     Follow up :  Next week    Voice-recognition software may have been used in the preparation of this document   Occasional wrong word or "sound-alike" substitutions may have occurred due to the inherent limitations of voice recognition software  Interpretation should be guided by context        SVETLANA Nunez

## 2022-08-09 NOTE — ASSESSMENT & PLAN NOTE
S/p ex lap, sigmoidectomy on 7/2/2022  - with dehisced area near the navel, wound increased, 4 5 x 1 5 x 1 5 compared to last week measurement of  2 x 1 5 x 1 5 cm, 100% granulation with no obvious sign of infection  -continue packing strip  - was seen by surgeon and there is no new order  - follow up next week

## 2022-08-16 ENCOUNTER — NURSING HOME VISIT (OUTPATIENT)
Dept: WOUND CARE | Facility: HOSPITAL | Age: 72
End: 2022-08-16
Payer: MEDICARE

## 2022-08-16 DIAGNOSIS — Z98.890 S/P EXPLORATORY LAPAROTOMY: Primary | ICD-10-CM

## 2022-08-16 PROCEDURE — 99308 SBSQ NF CARE LOW MDM 20: CPT | Performed by: NURSE PRACTITIONER

## 2022-08-16 NOTE — ASSESSMENT & PLAN NOTE
S/p ex lap, sigmoidectomy on 7/2/2022  - with dehisced area near the navel, wound decrease, 2 x 1 9 x 1 5 cm compared to last week measurement of 4 5 x 1 5 x 1 5 , 100% granulation with no obvious sign of infection  -on maxorb rope  -if wound did not show any improvement next week, will order wound vac  - follow up next week

## 2022-08-16 NOTE — PROGRESS NOTES
[address] MANAGEMENT   AND [facility]       Patient ID: Sejal Molina is a 71 y o  female Date of Birth 1950     Location of Service: [facility]    Performed wound round with: Wound team     Chief Complaint : Abdomen    Wound Instructions:  Wound:  Left foot plantar and Right foot dorsal, Left heel  Discontinue previous wound order  Moisturizing lotion to BLE daily    Abdomen  Cleanse the wound bed with NSS  Apply skin prep to periwound area  Apply calcium alginate rope to wound cavity and cover with bordered foam  Daily and prn for soiling      Offload all wounds  Turn and reposition frequently  Increase protein intake  Monitor for any sign of infection or worsening, inform PCP or patient's primary physician in your facility  Allergies  Latex and Other      Assessment & Plan:  1  S/P exploratory laparotomy  Assessment & Plan:  S/p ex lap, sigmoidectomy on 7/2/2022  - with dehisced area near the navel, wound decrease, 2 x 1 9 x 1 5 cm compared to last week measurement of 4 5 x 1 5 x 1 5 , 100% granulation with no obvious sign of infection  -on maxorb rope  -if wound did not show any improvement next week, will order wound vac  - follow up next week             Subjective: This is a 71 y o , female referred to our service for wound/ skin alterations on abdomen, left and right foot and neck  Patient have a complex medical history including but not limited to  has a past medical history of Back pain, Congestive heart failure ([address] ), Depressive disorder, Hypertension, Neurogenic bladder, Open wound of leg, Osteoarthritis, Osteomyelitis ([address] ), Paraplegia ([address] ), Seizure ([address] ), Thrombosis, Ulcer of ankle ([address] ), and Urinary retention    Patient was referred by Senior Care Team  Patient was seen in collaboration with the facility wound team      Wound History: All the wounds was seen by [facility] wound team  The etiology on the right dorsal foot is venous ulcer   The wound on the left foot is pressure ulcer  The wound on the neck is MASD  The wound on the abdomen is surgical incision  Patient hd ex lap, sigmoidelectomy for sigmoid volvolus for ischemia on 7/2/ 2022  Received patient in bed, asleep  No significant issues related to the wound  8/2/2022 Follow up for wound on the abdomen and right and left foot  Received patient, not in distress  Facility staff did not report any significant issues related to the wound  8/9/2022 Follow up for wound on the abdomen   Received patient, not in distress  Facility staff did not report any significant issues related to the wound  As per report, the patient was seen by surgeon and there is new order  8/16/2022 Follow up for wound on the abdomen   Received patient, not in distress  Facility staff did not report any significant issues related to the wound  Review of Systems   Reason unable to perform ROS: asleep  Objective:    Physical Exam  Constitutional:       Appearance: Normal appearance  HENT:      Head: Normocephalic and atraumatic  Nose: Nose normal       Mouth/Throat:      Pharynx: Oropharynx is clear  Eyes:      Conjunctiva/sclera: Conjunctivae normal    Pulmonary:      Effort: Pulmonary effort is normal    Abdominal:      Tenderness: There is no abdominal tenderness  There is no guarding  Genitourinary:     Comments: With indwelling catheter  Musculoskeletal:         General: No tenderness  Cervical back: Normal range of motion  Right lower leg: No edema  Left lower leg: No edema  Comments: + paraplegia  With moderate amount of edema on BLE   Skin:     Findings: Lesion present  Comments:   Abdomen incision ( dehisced) - 2 x 1 9x 1 5 cm ,  100 % granulation with moderate amount of serous drainage,  with no obvious sign of infection, no malodor     Neurological:      Mental Status: She is alert        Gait: Gait abnormal    Psychiatric:         Mood and Affect: Mood normal          Behavior: Behavior normal               Procedures           Patient's care was coordinated with nursing facility staff  Recent vitals, labs and updated medications were reviewed on EMR or chart system of facility  Past Medical, surgical, social, medication and allergy history and patient's previous records were reviewed and updated as appropriate: Most up-to date information is available in the facility EMR where the patient is currently admitted      Patient Active Problem List   Diagnosis    Neurogenic bladder    Chronic osteomyelitis ([address] )    Depression    [facility]) [facility]    Heart murmur    HTN (hypertension)    Hyperlipidemia    Hyponatremia    Pressure ulcer of contiguous region involving left buttock and hip, unstageable ([address] )    Kidney lesion, native, right    Neurogenic bowel    Paralysis ([address] )    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[address] )    Parkinson's disease ([address] )    Postoperative anemia due to acute blood loss    Seizure disorder ([facility])    Protein-calorie malnutrition ([facility])    Chronic right shoulder pain    CHF (congestive heart failure) ([facility])    Septic arthritis of hip ([facility])    Atrial fibrillation with RVR ([facility])    Chronic anticoagulation    Pressure injury of 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 ([facility])    Loose stools    Anemia due to chronic illness    Indigestion    Dehydration    Deep tissue injury    Open wound    Venous ulcer ([address] )    Pressure injury of left foot, unstageable ([address] )    S/P exploratory laparotomy     Past Medical History:   Diagnosis Date    Back pain     Congestive heart failure ([facility])     Depressive disorder     Hypertension     Neurogenic bladder     Open wound of leg     Osteoarthritis     Osteomyelitis ([address] )     Paraplegia ([address] )     Seizure ([address] )     Thrombosis     Ulcer of ankle ([address] )     Urinary retention      Past Surgical History:   Procedure Laterality Date    CYSTOSCOPY      SKIN GRAFT       Social History     Socioeconomic History    Marital status: /Civil Union     Spouse name: None    Number of children: None    Years of education: None    Highest education level: None   Occupational History    None   Tobacco Use    Smoking status: Never Smoker    Smokeless tobacco: Never Used   Substance and Sexual Activity    Alcohol use: No    Drug use: No    Sexual activity: None   Other Topics Concern    None   Social History Narrative    None     Social Determinants of Health     Financial Resource Strain: Not on file   Food Insecurity: Not on file   Transportation Needs: Not on file   Physical Activity: Not on file   Stress: Not on file   Social Connections: Not on file   Intimate Partner Violence: Not on file   Housing Stability: Not on file        Current Outpatient Medications:     acetaminophen (TYLENOL) 325 mg tablet, Take 650 mg by mouth every 4 (four) hours as needed, Disp: , Rfl:     amLODIPine-benazepril (LOTREL) 10-20 MG per capsule, Take by mouth , Disp: , Rfl:     atorvastatin (LIPITOR) 20 mg tablet, Take 20 mg by mouth daily , Disp: , Rfl:     Biotin 10 MG CAPS, Take 10 mg by mouth daily, Disp: , Rfl:     bisacodyl (Dulcolax) 10 mg suppository, Insert 10 mg into the rectum daily, Disp: , Rfl:     carbidopa-levodopa (SINEMET)  mg per tablet, Take 1 tablet by mouth 4 (four) times a day, Disp: , Rfl:     Cranberry 450 MG TABS, Take 450 mg by mouth, Disp: , Rfl:     famotidine (PEPCID) 20 mg tablet, Take 20 mg by mouth 2 (two) times a day, Disp: , Rfl:     furosemide (LASIX) 20 mg tablet, Take 20 mg by mouth daily, Disp: , Rfl:     magnesium hydroxide (MILK OF MAGNESIA) 400 mg/5 mL oral suspension, Take by mouth daily as needed for constipation, Disp: , Rfl:     metoprolol tartrate (LOPRESSOR) 50 mg tablet, Take 50 mg by mouth 2 (two) times a day, Disp: , Rfl:     mirtazapine (REMERON) 15 mg tablet, Take 7 5 mg by mouth daily at bedtime, Disp: , Rfl:     Multiple Vitamin (DAILY VALUE MULTIVITAMIN) TABS, Take 1 tablet by mouth daily , Disp: , Rfl:     Multiple Vitamins-Minerals (HAIR SKIN NAILS PO), Take 3 tablets by mouth daily, Disp: , Rfl:     Nutritional Supplements (Micha) PACK, Take by mouth, Disp: , Rfl:     Omega-3 Fatty Acids (FISH OIL) 1,000 mg, Take 1,000 mg by mouth daily , Disp: , Rfl:     oxyCODONE (ROXICODONE) 5 mg immediate release tablet, Take 1 tablet (5 mg total) by mouth every 6 (six) hours as needed for moderate painMax Daily Amount: 20 mg, Disp: 15 tablet, Rfl: 0    phenytoin (DILANTIN) 100 mg ER capsule, Take 100 mg by mouth every 12 (twelve) hours , Disp: , Rfl:     potassium chloride (K-DUR,KLOR-CON) 10 mEq tablet, , Disp: , Rfl:     psyllium (Metamucil) 0 52 g capsule, Take 0 52 g by mouth daily, Disp: , Rfl:     saccharomyces boulardii (Florastor) 250 mg capsule, Take 250 mg by mouth 2 (two) times a day, Disp: , Rfl:     senna (SENOKOT) 8 6 MG tablet, Take 8 6 mg by mouth 2 (two) times a day, Disp: , Rfl:     sertraline (ZOLOFT) 25 mg tablet, Take 100 mg by mouth daily, Disp: , Rfl:     sodium phosphate-biphosphate (FLEET) 7-19 g 118 mL enema, Insert 1 enema into the rectum, Disp: , Rfl:     vitamin B-12 (CYANOCOBALAMIN) 250 MCG tablet, Take 500 mcg by mouth daily, Disp: , Rfl:     warfarin (COUMADIN) 6 mg tablet, Take 6 mg by mouth daily, Disp: , Rfl:   Family History   Problem Relation Age of Onset    Diabetes Father     Kidney cancer Father     Alzheimer's disease Mother     Bone cancer Paternal Uncle     Lung cancer Paternal Uncle               Coordination of Care: Wound team aware of the treatment plan  Facility nurse will provide wound treatment and monitor the wound for any changes  Patient / Staff education : Staff was given education on sign of infection and pressure ulcer prevention  Staff verbalized understanding     Follow up :  Next week    Voice-recognition software may have been used in the preparation of this document  Occasional wrong word or "sound-alike" substitutions may have occurred due to the inherent limitations of voice recognition software  Interpretation should be guided by context        SVETLANA Ruvalcaba

## 2022-08-22 ENCOUNTER — NURSING HOME VISIT (OUTPATIENT)
Dept: WOUND CARE | Facility: HOSPITAL | Age: 72
End: 2022-08-22
Payer: MEDICARE

## 2022-08-22 DIAGNOSIS — Z98.890 S/P EXPLORATORY LAPAROTOMY: Primary | ICD-10-CM

## 2022-08-22 PROCEDURE — 99308 SBSQ NF CARE LOW MDM 20: CPT | Performed by: NURSE PRACTITIONER

## 2022-08-22 NOTE — PROGRESS NOTES
[address] MANAGEMENT   [facility]       Patient ID: Melo Summers is a 71 y o  female Date of Birth 1950     [facility] of [facility]     Performed wound round with: Wound team     Chief Complaint : Abdomen    Wound Instructions:  Abdomen  Cleanse the wound bed with NSS  Apply skin prep to periwound area  Apply calcium alginate rope to wound cavity and cover with ABD pad and secure with silicone tape  Daily and prn for soiling      Offload all wounds  Turn and reposition frequently  Increase protein intake  Monitor for any sign of infection or worsening, inform PCP or patient's primary physician in your facility  Allergies  Latex and Other      Assessment & Plan:  1  S/P exploratory laparotomy  Assessment & Plan:  S/p ex lap, sigmoidectomy on 7/2/2022  - with dehisced area near the navel, wound decrease, 1 9 x 1 5 x 1 0 cm compared to last week measurement of 2 x 1 9 x 1 5 cm , 100% granulation with no obvious sign of infection  -on maxorb rope  - patient have skin reaction with bordered gauze, change the cover to ABD and silicone tape  -if wound did not show any improvement next week, will order wound vac  - follow up next week             Subjective: This is a 71 y o , female referred to our service for wound/ skin alterations on abdomen, left and right foot and neck  Patient have a complex medical history including but not limited to  has a past medical history of Back pain, Congestive heart failure ([address] ), Depressive disorder, Hypertension, Neurogenic bladder, Open wound of leg, Osteoarthritis, Osteomyelitis ([address] ), Paraplegia ([address] ), Seizure ([address] ), Thrombosis, Ulcer of ankle ([address] ), and Urinary retention    Patient was referred by Senior Care Team  Patient was seen in collaboration with the facility wound team      Wound History: All the wounds was seen by [facility] wound team  The etiology on the right dorsal foot is venous ulcer   The wound on the left foot is pressure ulcer  The wound on the neck is MASD  The wound on the abdomen is surgical incision  Patient hd ex lap, sigmoidelectomy for sigmoid volvolus for ischemia on 7/2/ 2022  Received patient in bed, asleep  No significant issues related to the wound  8/2/2022 Follow up for wound on the abdomen and right and left foot  Received patient, not in distress  Facility staff did not report any significant issues related to the wound  8/9/2022 Follow up for wound on the abdomen   Received patient, not in distress  Facility staff did not report any significant issues related to the wound  As per report, the patient was seen by surgeon and there is new order  8/16/2022 Follow up for wound on the abdomen   Received patient, not in distress  Facility staff did not report any significant issues related to the wound  8/23/2022 Follow up for wound on the abdomen   Received patient, not in distress  Facility staff did not report any significant issues related to the wound  Patient denies pain  Dressing removed : packing strip  Review of Systems   Reason unable to perform ROS: asleep  Objective:    Physical Exam  Constitutional:       Appearance: Normal appearance  HENT:      Head: Normocephalic and atraumatic  Nose: Nose normal       Mouth/Throat:      Pharynx: Oropharynx is clear  Eyes:      Conjunctiva/sclera: Conjunctivae normal    Pulmonary:      Effort: Pulmonary effort is normal    Abdominal:      Tenderness: There is no abdominal tenderness  There is no guarding  Genitourinary:     Comments: With indwelling catheter  Musculoskeletal:         General: No tenderness  Cervical back: Normal range of motion  Right lower leg: No edema  Left lower leg: No edema  Comments: + paraplegia  With moderate amount of edema on BLE   Skin:     Findings: Lesion present        Comments:   Abdomen incision ( dehisced) - wound size is 1 9 x 1 5 x 1 0 cm  , 100 % granulation with moderate amount of serous drainage,  with no obvious sign of infection, no malodor,     + red and excoriation on the surrounding tissue from the bordered gauze     Neurological:      Mental Status: She is alert  Gait: Gait abnormal    Psychiatric:         Mood and Affect: Mood normal          Behavior: Behavior normal               Procedures           Patient's care was coordinated with nursing facility staff  Recent vitals, labs and updated medications were reviewed on EMR or chart system of facility  Past Medical, surgical, social, medication and allergy history and patient's previous records were reviewed and updated as appropriate: Most up-to date information is available in the facility EMR where the patient is currently admitted      Patient Active Problem List   Diagnosis    Neurogenic bladder    [facility]    Depression    [facility]) [facility]    Heart murmur    HTN (hypertension)    Hyperlipidemia    Hyponatremia    Pressure ulcer of contiguous region involving left buttock and hip, unstageable ([facility] )    Kidney lesion, native, right    Neurogenic bowel    Paralysis ([facility]    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[facility] )    Parkinson's disease ([facility] )    Postoperative anemia due to acute blood loss    Seizure disorder ([facility])    Protein-calorie malnutrition ([facility])    Chronic right shoulder pain    CHF (congestive heart failure) ([facility])    Septic arthritis of hip ([facility])    Atrial fibrillation with RVR ([facility])    Chronic anticoagulation    Pressure injury of 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 ([facility])    Loose stools    Anemia due to chronic illness    Indigestion    Dehydration    Deep tissue injury    Open wound    Venous ulcer ([address] )    Pressure injury of left foot, unstageable ([facility] )    S/P exploratory laparotomy     Past Medical History:   Diagnosis Date    Back pain     Congestive heart failure ([facility])     Depressive disorder     Hypertension     Neurogenic bladder     Open wound of leg     Osteoarthritis     Osteomyelitis (HCC)     Paraplegia ([facility])     Seizure [facility]     Thrombosis     Ulcer of ankle ([facility] )     Urinary retention      Past Surgical History:   Procedure Laterality Date    CYSTOSCOPY      SKIN GRAFT       Social History     Socioeconomic History    Marital status: /Civil Union     Spouse name: None    Number of children: None    Years of education: None    Highest education level: None   Occupational History    None   Tobacco Use    Smoking status: Never Smoker    Smokeless tobacco: Never Used   Substance and Sexual Activity    Alcohol use: No    Drug use: No    Sexual activity: None   Other Topics Concern    None   Social History Narrative    None     Social Determinants of Health     Financial Resource Strain: Not on file   Food Insecurity: Not on file   Transportation Needs: Not on file   Physical Activity: Not on file   Stress: Not on file   Social Connections: Not on file   Intimate Partner Violence: Not on file   Housing Stability: Not on file        Current Outpatient Medications:     acetaminophen (TYLENOL) 325 mg tablet, Take 650 mg by mouth every 4 (four) hours as needed, Disp: , Rfl:     amLODIPine-benazepril (LOTREL) 10-20 MG per capsule, Take by mouth , Disp: , Rfl:     atorvastatin (LIPITOR) 20 mg tablet, Take 20 mg by mouth daily , Disp: , Rfl:     Biotin 10 MG CAPS, Take 10 mg by mouth daily, Disp: , Rfl:     bisacodyl (Dulcolax) 10 mg suppository, Insert 10 mg into the rectum daily, Disp: , Rfl:     carbidopa-levodopa (SINEMET)  mg per tablet, Take 1 tablet by mouth 4 (four) times a day, Disp: , Rfl:     Cranberry 450 MG TABS, Take 450 mg by mouth, Disp: , Rfl:     famotidine (PEPCID) 20 mg tablet, Take 20 mg by mouth 2 (two) times a day, Disp: , Rfl:     furosemide (LASIX) 20 mg tablet, Take 20 mg by mouth daily, Disp: , Rfl:     magnesium hydroxide (MILK OF MAGNESIA) 400 mg/5 mL oral suspension, Take by mouth daily as needed for constipation, Disp: , Rfl:     metoprolol tartrate (LOPRESSOR) 50 mg tablet, Take 50 mg by mouth 2 (two) times a day, Disp: , Rfl:     mirtazapine (REMERON) 15 mg tablet, Take 7 5 mg by mouth daily at bedtime, Disp: , Rfl:     Multiple Vitamin (DAILY VALUE MULTIVITAMIN) TABS, Take 1 tablet by mouth daily , Disp: , Rfl:     Multiple Vitamins-Minerals (HAIR SKIN NAILS PO), Take 3 tablets by mouth daily, Disp: , Rfl:     Nutritional Supplements (Micha) PACK, Take by mouth, Disp: , Rfl:     Omega-3 Fatty Acids (FISH OIL) 1,000 mg, Take 1,000 mg by mouth daily , Disp: , Rfl:     oxyCODONE (ROXICODONE) 5 mg immediate release tablet, Take 1 tablet (5 mg total) by mouth every 6 (six) hours as needed for moderate painMax Daily Amount: 20 mg, Disp: 15 tablet, Rfl: 0    phenytoin (DILANTIN) 100 mg ER capsule, Take 100 mg by mouth every 12 (twelve) hours , Disp: , Rfl:     potassium chloride (K-DUR,KLOR-CON) 10 mEq tablet, , Disp: , Rfl:     psyllium (Metamucil) 0 52 g capsule, Take 0 52 g by mouth daily, Disp: , Rfl:     saccharomyces boulardii (Florastor) 250 mg capsule, Take 250 mg by mouth 2 (two) times a day, Disp: , Rfl:     senna (SENOKOT) 8 6 MG tablet, Take 8 6 mg by mouth 2 (two) times a day, Disp: , Rfl:     sertraline (ZOLOFT) 25 mg tablet, Take 100 mg by mouth daily, Disp: , Rfl:     sodium phosphate-biphosphate (FLEET) 7-19 g 118 mL enema, Insert 1 enema into the rectum, Disp: , Rfl:     vitamin B-12 (CYANOCOBALAMIN) 250 MCG tablet, Take 500 mcg by mouth daily, Disp: , Rfl:     warfarin (COUMADIN) 6 mg tablet, Take 6 mg by mouth daily, Disp: , Rfl:   Family History   Problem Relation Age of Onset    Diabetes Father     Kidney cancer Father     Alzheimer's disease Mother     Bone cancer Paternal Uncle     Lung cancer Paternal Uncle               Coordination of Care: Wound team aware of the treatment plan   Facility nurse will provide wound treatment and monitor the wound for any changes  Patient / Staff education : Staff was given education on sign of infection and pressure ulcer prevention  Staff verbalized understanding     Follow up :  Next week    Voice-recognition software may have been used in the preparation of this document  Occasional wrong word or "sound-alike" substitutions may have occurred due to the inherent limitations of voice recognition software  Interpretation should be guided by context        SVETLANA Poole

## 2022-08-23 NOTE — ASSESSMENT & PLAN NOTE
S/p ex lap, sigmoidectomy on 7/2/2022  - with dehisced area near the navel, wound decrease, 1 9 x 1 5 x 1 0 cm compared to last week measurement of 2 x 1 9 x 1 5 cm , 100% granulation with no obvious sign of infection  -on maxorb rope  - patient have skin reaction with bordered gauze, change the cover to ABD and silicone tape  -if wound did not show any improvement next week, will order wound vac  - follow up next week

## 2022-08-30 ENCOUNTER — NURSING HOME VISIT (OUTPATIENT)
Dept: WOUND CARE | Facility: HOSPITAL | Age: 72
End: 2022-08-30
Payer: MEDICARE

## 2022-08-30 DIAGNOSIS — Z98.890 S/P EXPLORATORY LAPAROTOMY: Primary | ICD-10-CM

## 2022-08-30 PROCEDURE — 99308 SBSQ NF CARE LOW MDM 20: CPT | Performed by: NURSE PRACTITIONER

## 2022-08-31 NOTE — PROGRESS NOTES
[address] MANAGEMENT   [facility]       Patient ID: Lucy Mccarthy is a 71 y o  female Date of Birth 1950     Location of Service: [facility]     Performed wound round with: Wound team     Chief Complaint : Abdomen    Wound Instructions:  Abdomen  Cleanse the wound bed with NSS  Apply skin prep to periwound area  Apply calcium alginate rope to wound cavity and cover with ABD pad and secure with silicone tape  Daily and prn for soiling    Moisturizing lotion to Bilateral feet daily  Offload all wounds  Turn and reposition frequently  Increase protein intake  Monitor for any sign of infection or worsening, inform PCP or patient's primary physician in your facility  Allergies  Latex and Other      Assessment & Plan:  1  S/P exploratory laparotomy  Assessment & Plan:  S/p ex lap, sigmoidectomy on 7/2/2022  - with dehisced area near the navel, wound size decrease, 1 3 x 1 3 x 0 5 cm compared to last week measurement of 1 9 x 1 5 x 1 0 cm , 100% granulation with no obvious sign of infection  -on maxorb rope  - patient have skin reaction with bordered gauze, change the cover to ABD and silicone tape  -if wound did not show any improvement next week, will order wound vac  - follow up Two weeks             Subjective: This is a 71 y o , female referred to our service for wound/ skin alterations on abdomen, left and right foot and neck  Patient have a complex medical history including but not limited to  has a past medical history of Back pain, Congestive heart failure ([address] ), Depressive disorder, Hypertension, Neurogenic bladder, Open wound of leg, Osteoarthritis, Osteomyelitis ([address] ), Paraplegia ([address] ), Seizure ([address] ), Thrombosis, Ulcer of ankle ([address] ), and Urinary retention    Patient was referred by Senior Care Team  Patient was seen in collaboration with the facility wound team      Wound History:    All the wounds was seen by [facility] wound team  The etiology on the right dorsal foot is venous ulcer  The wound on the left foot is pressure ulcer  The wound on the neck is MASD  The wound on the abdomen is surgical incision  Patient hd ex lap, sigmoidelectomy for sigmoid volvolus for ischemia on 7/2/ 2022  Received patient in bed, asleep  No significant issues related to the wound  8/2/2022 Follow up for wound on the abdomen and right and left foot  Received patient, not in distress  Facility staff did not report any significant issues related to the wound  8/9/2022 Follow up for wound on the abdomen   Received patient, not in distress  Facility staff did not report any significant issues related to the wound  As per report, the patient was seen by surgeon and there is new order  8/16/2022 Follow up for wound on the abdomen   Received patient, not in distress  Facility staff did not report any significant issues related to the wound  8/23/2022 Follow up for wound on the abdomen   Received patient, not in distress  Facility staff did not report any significant issues related to the wound  Patient denies pain  Dressing removed : packing strip  8/30/2022 Follow up for wound on the abdomen  Received patient, not in distress  Facility staff did not report any significant issues related to the wound  Denies pain  Review of Systems   Reason unable to perform ROS: asleep  Objective:    Physical Exam  Constitutional:       Appearance: Normal appearance  HENT:      Head: Normocephalic and atraumatic  Nose: Nose normal       Mouth/Throat:      Pharynx: Oropharynx is clear  Eyes:      Conjunctiva/sclera: Conjunctivae normal    Pulmonary:      Effort: Pulmonary effort is normal    Abdominal:      Tenderness: There is no abdominal tenderness  There is no guarding  Genitourinary:     Comments: With indwelling catheter  Musculoskeletal:         General: No tenderness  Cervical back: Normal range of motion  Right lower leg: No edema        Left lower leg: No edema  Comments: + paraplegia  With moderate amount of edema on BLE   Skin:     Findings: Lesion present  Comments:   Abdomen incision ( dehisced) - wound size is 1 3 x 1 3 x 0 5  cm  , 100 % granulation with moderate amount of serous drainage,  with no obvious sign of infection, no malodor,     Bilateral feet - with multiple small scab / eschar   Neurological:      Mental Status: She is alert  Gait: Gait abnormal    Psychiatric:         Mood and Affect: Mood normal          Behavior: Behavior normal               Procedures           Patient's care was coordinated with nursing facility staff  Recent vitals, labs and updated medications were reviewed on EMR or chart system of facility  Past Medical, surgical, social, medication and allergy history and patient's previous records were reviewed and updated as appropriate: Most up-to date information is available in the facility EMR where the patient is currently admitted      Patient Active Problem List   Diagnosis    Neurogenic bladder    Chronic osteomyelitis ([address] )    Depression    DVT (deep venous thrombosis) ([facility])    Heart murmur    HTN (hypertension)    Hyperlipidemia    Hyponatremia    Pressure ulcer of contiguous region involving left buttock and hip, unstageable ([address] )    Kidney lesion, native, right    Neurogenic bowel    Paralysis ([address] )    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[address] )    Parkinson's disease ([address] )    Postoperative anemia due to acute blood loss    Seizure disorder ([facility])    Protein-calorie malnutrition ([facility])    Chronic right shoulder pain    CHF (congestive heart failure) ([facility])    Septic arthritis of hip ([facility])    Atrial fibrillation with RVR ([facility])    Chronic anticoagulation    Pressure injury of 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 ([facility])    Loose stools    Anemia due to chronic illness    Indigestion    Dehydration    Deep tissue injury    Open wound    Venous ulcer ([address] )    Pressure injury of left foot, unstageable ([place] [address] )    S/P exploratory laparotomy     Past Medical History:   Diagnosis Date    Back pain     Congestive heart failure ([place] [address] )     Depressive disorder     Hypertension     Neurogenic bladder     Open wound of leg     Osteoarthritis     Osteomyelitis ([place] [address] )     Paraplegia ([facility])     Seizure ([place] [address] )     Thrombosis     Ulcer of ankle ([place] [address] )     Urinary retention      Past Surgical History:   Procedure Laterality Date    CYSTOSCOPY      SKIN GRAFT       Social History     Socioeconomic History    Marital status: /Civil Union     Spouse name: None    Number of children: None    Years of education: None    Highest education level: None   Occupational History    None   Tobacco Use    Smoking status: Never Smoker    Smokeless tobacco: Never Used   Substance and Sexual Activity    Alcohol use: No    Drug use: No    Sexual activity: None   Other Topics Concern    None   Social History Narrative    None     Social Determinants of Health     Financial Resource Strain: Not on file   Food Insecurity: Not on file   Transportation Needs: Not on file   Physical Activity: Not on file   Stress: Not on file   Social Connections: Not on file   Intimate Partner Violence: Not on file   Housing Stability: Not on file        Current Outpatient Medications:     acetaminophen (TYLENOL) 325 mg tablet, Take 650 mg by mouth every 4 (four) hours as needed, Disp: , Rfl:     amLODIPine-benazepril (LOTREL) 10-20 MG per capsule, Take by mouth , Disp: , Rfl:     atorvastatin (LIPITOR) 20 mg tablet, Take 20 mg by mouth daily , Disp: , Rfl:     Biotin 10 MG CAPS, Take 10 mg by mouth daily, Disp: , Rfl:     bisacodyl (Dulcolax) 10 mg suppository, Insert 10 mg into the rectum daily, Disp: , Rfl:     carbidopa-levodopa (SINEMET)  mg per tablet, Take 1 tablet by mouth 4 (four) times a day, Disp: , Rfl:     Cranberry 450 MG TABS, Take 450 mg by mouth, Disp: , Rfl:     famotidine (PEPCID) 20 mg tablet, Take 20 mg by mouth 2 (two) times a day, Disp: , Rfl:     furosemide (LASIX) 20 mg tablet, Take 20 mg by mouth daily, Disp: , Rfl:     magnesium hydroxide (MILK OF MAGNESIA) 400 mg/5 mL oral suspension, Take by mouth daily as needed for constipation, Disp: , Rfl:     metoprolol tartrate (LOPRESSOR) 50 mg tablet, Take 50 mg by mouth 2 (two) times a day, Disp: , Rfl:     mirtazapine (REMERON) 15 mg tablet, Take 7 5 mg by mouth daily at bedtime, Disp: , Rfl:     Multiple Vitamin (DAILY VALUE MULTIVITAMIN) TABS, Take 1 tablet by mouth daily , Disp: , Rfl:     Multiple Vitamins-Minerals (HAIR SKIN NAILS PO), Take 3 tablets by mouth daily, Disp: , Rfl:     Nutritional Supplements (Micha) PACK, Take by mouth, Disp: , Rfl:     Omega-3 Fatty Acids (FISH OIL) 1,000 mg, Take 1,000 mg by mouth daily , Disp: , Rfl:     oxyCODONE (ROXICODONE) 5 mg immediate release tablet, Take 1 tablet (5 mg total) by mouth every 6 (six) hours as needed for moderate painMax Daily Amount: 20 mg, Disp: 15 tablet, Rfl: 0    phenytoin (DILANTIN) 100 mg ER capsule, Take 100 mg by mouth every 12 (twelve) hours , Disp: , Rfl:     potassium chloride (K-DUR,KLOR-CON) 10 mEq tablet, , Disp: , Rfl:     psyllium (Metamucil) 0 52 g capsule, Take 0 52 g by mouth daily, Disp: , Rfl:     saccharomyces boulardii (Florastor) 250 mg capsule, Take 250 mg by mouth 2 (two) times a day, Disp: , Rfl:     senna (SENOKOT) 8 6 MG tablet, Take 8 6 mg by mouth 2 (two) times a day, Disp: , Rfl:     sertraline (ZOLOFT) 25 mg tablet, Take 100 mg by mouth daily, Disp: , Rfl:     sodium phosphate-biphosphate (FLEET) 7-19 g 118 mL enema, Insert 1 enema into the rectum, Disp: , Rfl:     vitamin B-12 (CYANOCOBALAMIN) 250 MCG tablet, Take 500 mcg by mouth daily, Disp: , Rfl:     warfarin (COUMADIN) 6 mg tablet, Take 6 mg by mouth daily, Disp: , Rfl:   Family History   Problem Relation Age of Onset    Diabetes Father     Kidney cancer Father    Isaias Willis Alzheimer's disease Mother     Bone cancer Paternal Uncle     Lung cancer Paternal Uncle               Coordination of Care: Wound team aware of the treatment plan  Facility nurse will provide wound treatment and monitor the wound for any changes  Patient / Staff education : Staff was given education on sign of infection and pressure ulcer prevention  Staff verbalized understanding     Follow up : Two weeks    Voice-recognition software may have been used in the preparation of this document  Occasional wrong word or "sound-alike" substitutions may have occurred due to the inherent limitations of voice recognition software  Interpretation should be guided by context        SVETLANA Torres

## 2022-08-31 NOTE — ASSESSMENT & PLAN NOTE
S/p ex lap, sigmoidectomy on 7/2/2022  - with dehisced area near the navel, wound size decrease, 1 3 x 1 3 x 0 5 cm compared to last week measurement of 1 9 x 1 5 x 1 0 cm , 100% granulation with no obvious sign of infection  -on maxorb rope  - patient have skin reaction with bordered gauze, change the cover to ABD and silicone tape  -if wound did not show any improvement next week, will order wound vac  - follow up Two weeks

## 2022-09-13 ENCOUNTER — NURSING HOME VISIT (OUTPATIENT)
Dept: WOUND CARE | Facility: HOSPITAL | Age: 72
End: 2022-09-13
Payer: MEDICARE

## 2022-09-13 DIAGNOSIS — Z98.890 S/P EXPLORATORY LAPAROTOMY: Primary | ICD-10-CM

## 2022-09-13 PROCEDURE — 99308 SBSQ NF CARE LOW MDM 20: CPT | Performed by: NURSE PRACTITIONER

## 2022-09-13 NOTE — PROGRESS NOTES
[address] MANAGEMENT   [facility]       Patient ID: Luis Alberto Costa is a 71 y o  female Date of Birth 1950     Location of Service: [facility]     Performed wound round with: Wound team     Chief Complaint : Abdomen    Wound Instructions:  Abdomen  Cleanse the wound bed with NSS  Apply skin prep to periwound area  Apply collagen wound dressing to wound cavity and cover with  DSD and secure with silicone tape  Daily and prn for soiling    Moisturizing lotion to Bilateral feet daily  Offload all wounds  Turn and reposition frequently  Increase protein intake  Monitor for any sign of infection or worsening, inform PCP or patient's primary physician in your facility  Allergies  Latex and Other      Assessment & Plan:  1  S/P exploratory laparotomy  Assessment & Plan:  S/p ex lap, sigmoidectomy on 7/2/2022  - with dehisced area near the navel, wound size decrease, 1 x 0 7 x 0 1 compared to last visit measurement of 1 3 x 1 3 x 0 5 cm ,100% granulation with no obvious sign of infection  -change to collagen - local wound care  - patient have skin reaction with bordered gauze, change the cover to ABD and silicone tape  - follow up next week             Subjective: This is a 71 y o , female referred to our service for wound/ skin alterations on abdomen, left and right foot and neck  Patient have a complex medical history including but not limited to  has a past medical history of Back pain, Congestive heart failure ([address] ), Depressive disorder, Hypertension, Neurogenic bladder, Open wound of leg, Osteoarthritis, Osteomyelitis ([address] ), Paraplegia ([address] ), Seizure ([address] ), Thrombosis, Ulcer of ankle ([address] ), and Urinary retention    Patient was referred by Senior Care Team  Patient was seen in collaboration with the facility wound team      Wound History: All the wounds was seen by [facility] wound team  The etiology on the right dorsal foot is venous ulcer   The wound on the left foot is pressure ulcer  The wound on the neck is MASD  The wound on the abdomen is surgical incision  Patient hd ex lap, sigmoidelectomy for sigmoid volvolus for ischemia on 7/2/ 2022  Received patient in bed, asleep  No significant issues related to the wound  8/2/2022 Follow up for wound on the abdomen and right and left foot  Received patient, not in distress  Facility staff did not report any significant issues related to the wound  8/9/2022 Follow up for wound on the abdomen   Received patient, not in distress  Facility staff did not report any significant issues related to the wound  As per report, the patient was seen by surgeon and there is new order  8/16/2022 Follow up for wound on the abdomen   Received patient, not in distress  Facility staff did not report any significant issues related to the wound  8/23/2022 Follow up for wound on the abdomen   Received patient, not in distress  Facility staff did not report any significant issues related to the wound  Patient denies pain  Dressing removed : packing strip  8/30/2022 Follow up for wound on the abdomen  Received patient, not in distress  Facility staff did not report any significant issues related to the wound  Denies pain  9/13/2022 Follow up for wound on the abdomen   Received patient, not in distress  Facility staff did not report any significant issues related to the wound  Review of Systems   Reason unable to perform ROS: asleep  Objective:    Physical Exam  Constitutional:       Appearance: Normal appearance  HENT:      Head: Normocephalic and atraumatic  Nose: Nose normal       Mouth/Throat:      Pharynx: Oropharynx is clear  Eyes:      Conjunctiva/sclera: Conjunctivae normal    Pulmonary:      Effort: Pulmonary effort is normal    Abdominal:      Tenderness: There is no abdominal tenderness  There is no guarding  Genitourinary:     Comments:  With indwelling catheter  Musculoskeletal: General: No tenderness  Cervical back: Normal range of motion  Right lower leg: No edema  Left lower leg: No edema  Comments: + paraplegia  With moderate amount of edema on BLE   Skin:     Findings: Lesion present  Comments:   Abdomen incision ( dehisced) - wound size is 1 0 x 0 7 x 0 1 cm  , 100 % granulation with small amount of serous drainage,  with no obvious sign of infection, no malodor,      Neurological:      Mental Status: She is alert  Gait: Gait abnormal    Psychiatric:         Mood and Affect: Mood normal          Behavior: Behavior normal               Procedures           Patient's care was coordinated with nursing facility staff  Recent vitals, labs and updated medications were reviewed on EMR or chart system of facility  Past Medical, surgical, social, medication and allergy history and patient's previous records were reviewed and updated as appropriate: Most up-to date information is available in the facility EMR where the patient is currently admitted      Patient Active Problem List   Diagnosis    Neurogenic bladder    Chronic osteomyelitis ([address] )    Depression    [facility]    Heart murmur    HTN (hypertension)    Hyperlipidemia    Hyponatremia    Pressure ulcer of contiguous region involving left buttock and hip, unstageable ([address] )    Kidney lesion, native, right    Neurogenic bowel    Paralysis ([address] )    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[address] )    Parkinson's disease ([address] )    Postoperative anemia due to acute blood loss    Seizure disorder ([facility]    Protein-calorie malnutrition ([facility])    Chronic right shoulder pain    CHF (congestive heart failure) ([facility])    Septic arthritis of hip ([facility])    Atrial fibrillation with RVR ([facility])    Chronic anticoagulation    Pressure injury of 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 ([facility])    Loose stools    Anemia due to chronic illness    Indigestion    Dehydration    Deep tissue injury    Open wound  Venous ulcer ([place] [address] )    Pressure injury of left foot, unstageable ([place] [address] )    S/P exploratory laparotomy     Past Medical History:   Diagnosis Date    Back pain     Congestive heart failure ([place] [address] )     Depressive disorder     Hypertension     Neurogenic bladder     Open wound of leg     Osteoarthritis     Osteomyelitis ([place] [address] )     Paraplegia ([facility])     Seizure ([place] [address] )     Thrombosis     Ulcer of ankle ([place] [address] )     Urinary retention      Past Surgical History:   Procedure Laterality Date    CYSTOSCOPY      SKIN GRAFT       Social History     Socioeconomic History    Marital status: /Civil Union     Spouse name: Not on file    Number of children: Not on file    Years of education: Not on file    Highest education level: Not on file   Occupational History    Not on file   Tobacco Use    Smoking status: Never Smoker    Smokeless tobacco: Never Used   Substance and Sexual Activity    Alcohol use: No    Drug use: No    Sexual activity: Not on file   Other Topics Concern    Not on file   Social History Narrative    Not on file     Social Determinants of Health     Financial Resource Strain: Not on file   Food Insecurity: Not on file   Transportation Needs: Not on file   Physical Activity: Not on file   Stress: Not on file   Social Connections: Not on file   Intimate Partner Violence: Not on file   Housing Stability: Not on file        Current Outpatient Medications:     acetaminophen (TYLENOL) 325 mg tablet, Take 650 mg by mouth every 4 (four) hours as needed, Disp: , Rfl:     amLODIPine-benazepril (LOTREL) 10-20 MG per capsule, Take by mouth , Disp: , Rfl:     atorvastatin (LIPITOR) 20 mg tablet, Take 20 mg by mouth daily , Disp: , Rfl:     Biotin 10 MG CAPS, Take 10 mg by mouth daily, Disp: , Rfl:     bisacodyl (Dulcolax) 10 mg suppository, Insert 10 mg into the rectum daily, Disp: , Rfl:     carbidopa-levodopa (SINEMET)  mg per tablet, Take 1 tablet by mouth 4 (four) times a day, Disp: , Rfl:     Cranberry 450 MG TABS, Take 450 mg by mouth, Disp: , Rfl:     famotidine (PEPCID) 20 mg tablet, Take 20 mg by mouth 2 (two) times a day, Disp: , Rfl:     furosemide (LASIX) 20 mg tablet, Take 20 mg by mouth daily, Disp: , Rfl:     magnesium hydroxide (MILK OF MAGNESIA) 400 mg/5 mL oral suspension, Take by mouth daily as needed for constipation, Disp: , Rfl:     metoprolol tartrate (LOPRESSOR) 50 mg tablet, Take 50 mg by mouth 2 (two) times a day, Disp: , Rfl:     mirtazapine (REMERON) 15 mg tablet, Take 7 5 mg by mouth daily at bedtime, Disp: , Rfl:     Multiple Vitamin (DAILY VALUE MULTIVITAMIN) TABS, Take 1 tablet by mouth daily , Disp: , Rfl:     Multiple Vitamins-Minerals (HAIR SKIN NAILS PO), Take 3 tablets by mouth daily, Disp: , Rfl:     Nutritional Supplements (Micha) PACK, Take by mouth, Disp: , Rfl:     Omega-3 Fatty Acids (FISH OIL) 1,000 mg, Take 1,000 mg by mouth daily , Disp: , Rfl:     oxyCODONE (ROXICODONE) 5 mg immediate release tablet, Take 1 tablet (5 mg total) by mouth every 6 (six) hours as needed for moderate painMax Daily Amount: 20 mg, Disp: 15 tablet, Rfl: 0    phenytoin (DILANTIN) 100 mg ER capsule, Take 100 mg by mouth every 12 (twelve) hours , Disp: , Rfl:     potassium chloride (K-DUR,KLOR-CON) 10 mEq tablet, , Disp: , Rfl:     psyllium (Metamucil) 0 52 g capsule, Take 0 52 g by mouth daily, Disp: , Rfl:     saccharomyces boulardii (Florastor) 250 mg capsule, Take 250 mg by mouth 2 (two) times a day, Disp: , Rfl:     senna (SENOKOT) 8 6 MG tablet, Take 8 6 mg by mouth 2 (two) times a day, Disp: , Rfl:     sertraline (ZOLOFT) 25 mg tablet, Take 100 mg by mouth daily, Disp: , Rfl:     sodium phosphate-biphosphate (FLEET) 7-19 g 118 mL enema, Insert 1 enema into the rectum, Disp: , Rfl:     vitamin B-12 (CYANOCOBALAMIN) 250 MCG tablet, Take 500 mcg by mouth daily, Disp: , Rfl:     warfarin (COUMADIN) 6 mg tablet, Take 6 mg by mouth daily, Disp: , Rfl: Family History   Problem Relation Age of Onset    Diabetes Father     Kidney cancer Father     Alzheimer's disease Mother     Bone cancer Paternal Uncle     Lung cancer Paternal Uncle               Coordination of Care: Wound team aware of the treatment plan  Facility nurse will provide wound treatment and monitor the wound for any changes  Patient / Staff education : Staff was given education on sign of infection and pressure ulcer prevention  Staff verbalized understanding     Follow up :  Next week    Voice-recognition software may have been used in the preparation of this document  Occasional wrong word or "sound-alike" substitutions may have occurred due to the inherent limitations of voice recognition software  Interpretation should be guided by context        SVETLANA Owen

## 2022-09-13 NOTE — ASSESSMENT & PLAN NOTE
S/p ex lap, sigmoidectomy on 7/2/2022  - with dehisced area near the navel, wound size decrease, 1 x 0 7 x 0 1 compared to last visit measurement of 1 3 x 1 3 x 0 5 cm ,100% granulation with no obvious sign of infection  -change to collagen - local wound care  - patient have skin reaction with bordered gauze, change the cover to ABD and silicone tape  - follow up next week

## 2022-09-20 ENCOUNTER — NURSING HOME VISIT (OUTPATIENT)
Dept: WOUND CARE | Facility: HOSPITAL | Age: 72
End: 2022-09-20
Payer: MEDICARE

## 2022-09-20 DIAGNOSIS — G82.20 PARAPLEGIA FOLLOWING SPINAL CORD INJURY (HCC): ICD-10-CM

## 2022-09-20 DIAGNOSIS — Z98.890 S/P EXPLORATORY LAPAROTOMY: Primary | ICD-10-CM

## 2022-09-20 PROCEDURE — 99308 SBSQ NF CARE LOW MDM 20: CPT | Performed by: NURSE PRACTITIONER

## 2022-09-20 NOTE — PROGRESS NOTES
[address] MANAGEMENT   AND [facility]PER[facility]IC [facility]INE [facility]ENTER       Patient ID: Bethany Lott is a 71 y o  female Date of Birth 1950     Location of Service: [facility]     Performed wound round with: Wound team     Chief Complaint : Abdomen    Wound Instructions:  Abdomen  Cleanse the wound bed with NSS  Apply skin prep to periwound area  Apply collagen wound dressing to wound cavity and cover with  DSD and secure with silicone tape  Daily and prn for soiling    Moisturizing lotion to Bilateral feet daily  Offload all wounds  Turn and reposition frequently  Increase protein intake  Monitor for any sign of infection or worsening, inform PCP or patient's primary physician in your facility  Allergies  Latex and Other      Assessment & Plan:  1  S/P exploratory laparotomy  Assessment & Plan:  S/p ex lap, sigmoidectomy on 7/2/2022  - with dehisced area near the navel, wound size almost the same as last week, improved wound bed, with no obvious sign of infection  -change to collagen - local wound care  - patient have skin reaction with bordered gauze, change the cover to ABD and silicone tape  - follow up next week      2  Paraplegia following spinal cord injury [facility])  Assessment & Plan:  dependent with positioning when in bed             Subjective: This is a 71 y o , female referred to our service for wound/ skin alterations on abdomen, left and right foot and neck  Patient have a complex medical history including but not limited to  has a past medical history of Back pain, Congestive heart failure ([address] ), Depressive disorder, Hypertension, Neurogenic bladder, Open wound of leg, Osteoarthritis, Osteomyelitis ([address] ), Paraplegia ([address] ), Seizure ([address] ), Thrombosis, Ulcer of ankle ([address] ), and Urinary retention    Patient was referred by Senior Care Team  Patient was seen in collaboration with the facility wound team      Wound History:    All the wounds was seen by [facility] wound team  The etiology on the right dorsal foot is venous ulcer  The wound on the left foot is pressure ulcer  The wound on the neck is MASD  The wound on the abdomen is surgical incision  Patient hd ex lap, sigmoidelectomy for sigmoid volvolus for ischemia on 7/2/ 2022  Received patient in bed, asleep  No significant issues related to the wound  8/2/2022 Follow up for wound on the abdomen and right and left foot  Received patient, not in distress  Facility staff did not report any significant issues related to the wound  8/9/2022 Follow up for wound on the abdomen   Received patient, not in distress  Facility staff did not report any significant issues related to the wound  As per report, the patient was seen by surgeon and there is new order  8/16/2022 Follow up for wound on the abdomen   Received patient, not in distress  Facility staff did not report any significant issues related to the wound  8/23/2022 Follow up for wound on the abdomen   Received patient, not in distress  Facility staff did not report any significant issues related to the wound  Patient denies pain  Dressing removed : packing strip  8/30/2022 Follow up for wound on the abdomen  Received patient, not in distress  Facility staff did not report any significant issues related to the wound  Denies pain  9/13/2022 Follow up for wound on the abdomen   Received patient, not in distress  Facility staff did not report any significant issues related to the wound  9/20/2022 Follow up for wound on the abdomen   Received patient, not in distress  Facility staff did not report any significant issues related to the wound  Collagen dressing not started due to unavailability  Review of Systems   Reason unable to perform ROS: asleep  Objective:    Physical Exam  Constitutional:       Appearance: Normal appearance  HENT:      Head: Normocephalic and atraumatic  Nose: Nose normal       Mouth/Throat:      Pharynx: Oropharynx is clear  Eyes:      Conjunctiva/sclera: Conjunctivae normal    Pulmonary:      Effort: Pulmonary effort is normal    Abdominal:      Tenderness: There is no abdominal tenderness  There is no guarding  Genitourinary:     Comments: With indwelling catheter  Musculoskeletal:         General: No tenderness  Cervical back: Normal range of motion  Right lower leg: No edema  Left lower leg: No edema  Comments: + paraplegia  With moderate amount of edema on BLE   Skin:     Findings: Lesion present  Comments:   Abdomen incision ( dehisced) - wound size is 1 0 x 0 8 x 0 1 cm  , 100 % epithelial with small amount of serous drainage,  with no obvious sign of infection, no malodor,      Neurological:      Mental Status: She is alert  Gait: Gait abnormal    Psychiatric:         Mood and Affect: Mood normal          Behavior: Behavior normal               Procedures           Patient's care was coordinated with nursing facility staff  Recent vitals, labs and updated medications were reviewed on EMR or chart system of facility  Past Medical, surgical, social, medication and allergy history and patient's previous records were reviewed and updated as appropriate: Most up-to date information is available in the facility EMR where the patient is currently admitted      Patient Active Problem List   Diagnosis    Neurogenic bladder    [facility]    Depression    DVT [facility]deep venous thrombosis) ([facility]    Heart murmur    HTN (hypertension)    Hyperlipidemia    Hyponatremia    Pressure ulcer of contiguous region involving left buttock and hip, unstageable ([address] )    Kidney lesion, native, right    Neurogenic bowel    Paralysis ([address] )    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[address] )    Parkinson's disease ([facility]    Postoperative anemia due to acute blood loss    Seizure disorder ([facility])    Protein-calorie malnutrition (H[facility])    Chronic right shoulder pain    CHF (congestive heart failure) ([place] [address] )    Septic arthritis of hip ([place] [address] )    [facility] with RVR ([facility]    Chronic anticoagulation    Pressure injury of 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 ([facility])    Loose stools    Anemia due to chronic illness    Indigestion    Dehydration    Deep tissue injury    Open wound    Venous ulcer ([place] [address] )    Pressure injury of left foot, unstageable ([place] [address] )    S/P exploratory laparotomy     Past Medical History:   Diagnosis Date    Back pain     Congestive heart failure ([facility])     Depressive disorder     Hypertension     Neurogenic bladder     Open wound of leg     Osteoarthritis     Osteomyelitis ([place] [address] )     Paraplegia ([place] [address] )     Seizure ([place] [address] )     Thrombosis     Ulcer of ankle ([place] [address] )     Urinary retention      Past Surgical History:   Procedure Laterality Date    CYSTOSCOPY      SKIN GRAFT       Social History     Socioeconomic History    Marital status: /Civil Union     Spouse name: None    Number of children: None    Years of education: None    Highest education level: None   Occupational History    None   Tobacco Use    Smoking status: Never Smoker    Smokeless tobacco: Never Used   Substance and Sexual Activity    Alcohol use: No    Drug use: No    Sexual activity: None   Other Topics Concern    None   Social History Narrative    None     Social Determinants of Health     Financial Resource Strain: Not on file   Food Insecurity: Not on file   Transportation Needs: Not on file   Physical Activity: Not on file   Stress: Not on file   Social Connections: Not on file   Intimate Partner Violence: Not on file   Housing Stability: Not on file        Current Outpatient Medications:     acetaminophen (TYLENOL) 325 mg tablet, Take 650 mg by mouth every 4 (four) hours as needed, Disp: , Rfl:     amLODIPine-benazepril (LOTREL) 10-20 MG per capsule, Take by mouth , Disp: , Rfl:     atorvastatin (LIPITOR) 20 mg tablet, Take 20 mg by mouth daily , Disp: , Rfl:     Biotin 10 MG CAPS, Take 10 mg by mouth daily, Disp: , Rfl:     bisacodyl (Dulcolax) 10 mg suppository, Insert 10 mg into the rectum daily, Disp: , Rfl:     carbidopa-levodopa (SINEMET)  mg per tablet, Take 1 tablet by mouth 4 (four) times a day, Disp: , Rfl:     Cranberry 450 MG TABS, Take 450 mg by mouth, Disp: , Rfl:     famotidine (PEPCID) 20 mg tablet, Take 20 mg by mouth 2 (two) times a day, Disp: , Rfl:     furosemide (LASIX) 20 mg tablet, Take 20 mg by mouth daily, Disp: , Rfl:     magnesium hydroxide (MILK OF MAGNESIA) 400 mg/5 mL oral suspension, Take by mouth daily as needed for constipation, Disp: , Rfl:     metoprolol tartrate (LOPRESSOR) 50 mg tablet, Take 50 mg by mouth 2 (two) times a day, Disp: , Rfl:     mirtazapine (REMERON) 15 mg tablet, Take 7 5 mg by mouth daily at bedtime, Disp: , Rfl:     Multiple Vitamin (DAILY VALUE MULTIVITAMIN) TABS, Take 1 tablet by mouth daily , Disp: , Rfl:     Multiple Vitamins-Minerals (HAIR SKIN NAILS PO), Take 3 tablets by mouth daily, Disp: , Rfl:     Nutritional Supplements (Micha) PACK, Take by mouth, Disp: , Rfl:     Omega-3 Fatty Acids (FISH OIL) 1,000 mg, Take 1,000 mg by mouth daily , Disp: , Rfl:     oxyCODONE (ROXICODONE) 5 mg immediate release tablet, Take 1 tablet (5 mg total) by mouth every 6 (six) hours as needed for moderate painMax Daily Amount: 20 mg, Disp: 15 tablet, Rfl: 0    phenytoin (DILANTIN) 100 mg ER capsule, Take 100 mg by mouth every 12 (twelve) hours , Disp: , Rfl:     potassium chloride (K-DUR,KLOR-CON) 10 mEq tablet, , Disp: , Rfl:     psyllium (Metamucil) 0 52 g capsule, Take 0 52 g by mouth daily, Disp: , Rfl:     saccharomyces boulardii (Florastor) 250 mg capsule, Take 250 mg by mouth 2 (two) times a day, Disp: , Rfl:     senna (SENOKOT) 8 6 MG tablet, Take 8 6 mg by mouth 2 (two) times a day, Disp: , Rfl:     sertraline (ZOLOFT) 25 mg tablet, Take 100 mg by mouth daily, Disp: , Rfl:     sodium phosphate-biphosphate (FLEET) 7-19 g 118 mL enema, Insert 1 enema into the rectum, Disp: , Rfl:     vitamin B-12 (CYANOCOBALAMIN) 250 MCG tablet, Take 500 mcg by mouth daily, Disp: , Rfl:     warfarin (COUMADIN) 6 mg tablet, Take 6 mg by mouth daily, Disp: , Rfl:   Family History   Problem Relation Age of Onset    Diabetes Father     Kidney cancer Father     Alzheimer's disease Mother     Bone cancer Paternal Uncle     Lung cancer Paternal Uncle               Coordination of Care: Wound team aware of the treatment plan  Facility nurse will provide wound treatment and monitor the wound for any changes  Patient / Staff education : Staff was given education on sign of infection and pressure ulcer prevention  Staff verbalized understanding     Follow up :  Next week    Voice-recognition software may have been used in the preparation of this document  Occasional wrong word or "sound-alike" substitutions may have occurred due to the inherent limitations of voice recognition software  Interpretation should be guided by context        SVETLANA Quintanilla

## 2022-09-20 NOTE — ASSESSMENT & PLAN NOTE
S/p ex lap, sigmoidectomy on 7/2/2022  - with dehisced area near the navel, wound size almost the same as last week, improved wound bed, with no obvious sign of infection  -change to collagen - local wound care  - patient have skin reaction with bordered gauze, change the cover to ABD and silicone tape  - follow up next week

## 2022-09-27 ENCOUNTER — NURSING HOME VISIT (OUTPATIENT)
Dept: WOUND CARE | Facility: HOSPITAL | Age: 72
End: 2022-09-27
Payer: MEDICARE

## 2022-09-27 DIAGNOSIS — Z98.890 S/P EXPLORATORY LAPAROTOMY: Primary | ICD-10-CM

## 2022-09-27 PROCEDURE — 99308 SBSQ NF CARE LOW MDM 20: CPT | Performed by: NURSE PRACTITIONER

## 2022-09-28 NOTE — PROGRESS NOTES
[address] MANAGEMENT   AND [facility]PER[facility]INE [facility]ENTER       Patient ID: Connie Sanz is a 71 y o  female Date of Birth 1950     Location of Service: [facility]     Performed wound round with: Wound team     Chief Complaint : Abdomen    Wound Instructions:  Abdomen  Cleanse the wound bed with NSS  Apply skin prep to periwound area  Apply triad paste to wound bed  Daily and prn for soiling    Moisturizing lotion to Bilateral feet daily  Offload all wounds  Turn and reposition frequently  Increase protein intake  Monitor for any sign of infection or worsening, inform PCP or patient's primary physician in your facility  Allergies  Latex and Other      Assessment & Plan:  1  S/P exploratory laparotomy  Assessment & Plan:  S/p ex lap, sigmoidectomy on 7/2/2022  - dehisce area wound size decreased, 0 3 x 0 3 cm,  improved wound bed, with no obvious sign of infection  - local wound care - changed to Triad paste  - follow up next week             Subjective: This is a 71 y o , female referred to our service for wound/ skin alterations on abdomen, left and right foot and neck  Patient have a complex medical history including but not limited to  has a past medical history of Back pain, Congestive heart failure ([address] ), Depressive disorder, Hypertension, Neurogenic bladder, Open wound of leg, Osteoarthritis, Osteomyelitis ([address] ), Paraplegia ([address] ), Seizure ([address] ), Thrombosis, Ulcer of ankle ([address] ), and Urinary retention    Patient was referred by Senior Care Team  Patient was seen in collaboration with the facility wound team      Wound History: All the wounds was seen by [facility] wound team  The etiology on the right dorsal foot is venous ulcer  The wound on the left foot is pressure ulcer  The wound on the neck is MASD  The wound on the abdomen is surgical incision  Patient hd ex lap, sigmoidelectomy for sigmoid volvolus for ischemia on 7/2/ 2022  Received patient in bed, asleep    No significant issues related to the wound  8/2/2022 Follow up for wound on the abdomen and right and left foot  Received patient, not in distress  Facility staff did not report any significant issues related to the wound  8/9/2022 Follow up for wound on the abdomen   Received patient, not in distress  Facility staff did not report any significant issues related to the wound  As per report, the patient was seen by surgeon and there is new order  8/16/2022 Follow up for wound on the abdomen   Received patient, not in distress  Facility staff did not report any significant issues related to the wound  8/23/2022 Follow up for wound on the abdomen   Received patient, not in distress  Facility staff did not report any significant issues related to the wound  Patient denies pain  Dressing removed : packing strip  8/30/2022 Follow up for wound on the abdomen  Received patient, not in distress  Facility staff did not report any significant issues related to the wound  Denies pain  9/13/2022 Follow up for wound on the abdomen   Received patient, not in distress  Facility staff did not report any significant issues related to the wound  9/20/2022 Follow up for wound on the abdomen   Received patient, not in distress  Facility staff did not report any significant issues related to the wound  Collagen dressing not started due to unavailability  9/27/2022 Follow up for wound on the abdomen   Received patient, not in distress  Facility staff did not report any significant issues related to the wound  Dressing removed - dsd        Review of Systems   Reason unable to perform ROS: asleep  Constitutional: Negative  HENT: Negative  Eyes: Negative  Respiratory: Negative  Cardiovascular: Negative for chest pain and leg swelling  Gastrointestinal: Negative  Endocrine: Negative  Genitourinary: Negative  Musculoskeletal: Positive for gait problem  Skin: Positive for wound          See HPI Neurological: Negative for dizziness and headaches  Psychiatric/Behavioral: Negative for behavioral problems  Objective:    Physical Exam  Constitutional:       Appearance: Normal appearance  HENT:      Head: Normocephalic and atraumatic  Nose: Nose normal       Mouth/Throat:      Pharynx: Oropharynx is clear  Eyes:      Conjunctiva/sclera: Conjunctivae normal    Pulmonary:      Effort: Pulmonary effort is normal    Abdominal:      Tenderness: There is no abdominal tenderness  There is no guarding  Genitourinary:     Comments: With indwelling catheter  Musculoskeletal:         General: No tenderness  Cervical back: Normal range of motion  Right lower leg: No edema  Left lower leg: No edema  Comments: + paraplegia  With moderate amount of edema on BLE   Skin:     Findings: Lesion present  Comments:   Abdomen incision ( dehisced) - wound size is 0 3 x 0 3 x 0 1 cm  , 100 % epithelial with small amount of serous drainage,  with no obvious sign of infection, no malodor,      Neurological:      Mental Status: She is alert  Gait: Gait abnormal    Psychiatric:         Mood and Affect: Mood normal          Behavior: Behavior normal               Procedures           Patient's care was coordinated with nursing facility staff  Recent vitals, labs and updated medications were reviewed on EMR or chart system of facility  Past Medical, surgical, social, medication and allergy history and patient's previous records were reviewed and updated as appropriate: Most up-to date information is available in the facility EMR where the patient is currently admitted      Patient Active Problem List   Diagnosis    Neurogenic bladder    [facility]    Depression    [facility]    Heart murmur    HTN (hypertension)    Hyperlipidemia    Hyponatremia    Pressure ulcer of contiguous region involving left buttock and hip, unstageable ([facility] )    Kidney lesion, native, right    Neurogenic bowel    [facility]    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[facility] )    Parkinson's disease ([facility]    Postoperative anemia due to acute blood loss    [facility] disorder ([facility]    Protein-calorie malnutrition ([facility])    Chronic right shoulder pain    CHF (congestive heart failure) ([facility])    Septic arthritis of hip ([facility])    Atrial fibrillation with RVR ([facility])    Chronic anticoagulation    Pressure injury of 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 ([facility])    Loose stools    Anemia due to chronic illness    Indigestion    Dehydration    Deep tissue injury    Open wound    Venous ulcer ([facility]    Pressure injury of left foot, unstageable ([facility] )    S/P exploratory laparotomy     Past Medical History:   Diagnosis Date    Back pain     Congestive [facility] failure ([facility])     Depressive disorder     Hypertension     Neurogenic bladder     Open wound of leg     Osteoarthritis     Osteomyelitis ([facility]     Paraplegia ([facility]     Seizure ([facility] )     Thrombosis     [facility]     Urinary retention      Past Surgical History:   Procedure Laterality Date    CYSTOSCOPY      SKIN GRAFT       Social History     Socioeconomic History    Marital status: /Civil Union     Spouse name: None    Number of children: None    Years of education: None    Highest education level: None   Occupational History    None   Tobacco Use    Smoking status: Never Smoker    Smokeless tobacco: Never Used   Substance and Sexual Activity    Alcohol use: No    Drug use: No    Sexual activity: None   Other Topics Concern    None   Social History Narrative    None     Social Determinants of Health     Financial Resource Strain: Not on file   Food Insecurity: Not on file   Transportation Needs: Not on file   Physical Activity: Not on file   Stress: Not on file   Social Connections: Not on file   Intimate Partner Violence: Not on file   Housing Stability: Not on file        Current Outpatient Medications:     acetaminophen (TYLENOL) 325 mg tablet, Take 650 mg by mouth every 4 (four) hours as needed, Disp: , Rfl:     amLODIPine-benazepril (LOTREL) 10-20 MG per capsule, Take by mouth , Disp: , Rfl:     atorvastatin (LIPITOR) 20 mg tablet, Take 20 mg by mouth daily , Disp: , Rfl:     Biotin 10 MG CAPS, Take 10 mg by mouth daily, Disp: , Rfl:     bisacodyl (Dulcolax) 10 mg suppository, Insert 10 mg into the rectum daily, Disp: , Rfl:     carbidopa-levodopa (SINEMET)  mg per tablet, Take 1 tablet by mouth 4 (four) times a day, Disp: , Rfl:     Cranberry 450 MG TABS, Take 450 mg by mouth, Disp: , Rfl:     famotidine (PEPCID) 20 mg tablet, Take 20 mg by mouth 2 (two) times a day, Disp: , Rfl:     furosemide (LASIX) 20 mg tablet, Take 20 mg by mouth daily, Disp: , Rfl:     magnesium hydroxide (MILK OF MAGNESIA) 400 mg/5 mL oral suspension, Take by mouth daily as needed for constipation, Disp: , Rfl:     metoprolol tartrate (LOPRESSOR) 50 mg tablet, Take 50 mg by mouth 2 (two) times a day, Disp: , Rfl:     mirtazapine (REMERON) 15 mg tablet, Take 7 5 mg by mouth daily at bedtime, Disp: , Rfl:     Multiple Vitamin (DAILY VALUE MULTIVITAMIN) TABS, Take 1 tablet by mouth daily , Disp: , Rfl:     Multiple Vitamins-Minerals (HAIR SKIN NAILS PO), Take 3 tablets by mouth daily, Disp: , Rfl:     Nutritional Supplements (Micha) PACK, Take by mouth, Disp: , Rfl:     Omega-3 Fatty Acids (FISH OIL) 1,000 mg, Take 1,000 mg by mouth daily , Disp: , Rfl:     oxyCODONE (ROXICODONE) 5 mg immediate release tablet, Take 1 tablet (5 mg total) by mouth every 6 (six) hours as needed for moderate painMax Daily Amount: 20 mg, Disp: 15 tablet, Rfl: 0    phenytoin (DILANTIN) 100 mg ER capsule, Take 100 mg by mouth every 12 (twelve) hours , Disp: , Rfl:     potassium chloride (K-DUR,KLOR-CON) 10 mEq tablet, , Disp: , Rfl:     psyllium (Metamucil) 0 52 g capsule, Take 0 52 g by mouth daily, Disp: , Rfl:    saccharomyces boulardii (Florastor) 250 mg capsule, Take 250 mg by mouth 2 (two) times a day, Disp: , Rfl:     senna (SENOKOT) 8 6 MG tablet, Take 8 6 mg by mouth 2 (two) times a day, Disp: , Rfl:     sertraline (ZOLOFT) 25 mg tablet, Take 100 mg by mouth daily, Disp: , Rfl:     sodium phosphate-biphosphate (FLEET) 7-19 g 118 mL enema, Insert 1 enema into the rectum, Disp: , Rfl:     vitamin B-12 (CYANOCOBALAMIN) 250 MCG tablet, Take 500 mcg by mouth daily, Disp: , Rfl:     warfarin (COUMADIN) 6 mg tablet, Take 6 mg by mouth daily, Disp: , Rfl:   Family History   Problem Relation Age of Onset    Diabetes Father     Kidney cancer Father     Alzheimer's disease Mother     Bone cancer Paternal Uncle     Lung cancer Paternal Uncle               Coordination of Care: Wound team aware of the treatment plan  Facility nurse will provide wound treatment and monitor the wound for any changes  Patient / Staff education : Staff was given education on sign of infection and pressure ulcer prevention  Staff verbalized understanding     Follow up :  Next week    Voice-recognition software may have been used in the preparation of this document  Occasional wrong word or "sound-alike" substitutions may have occurred due to the inherent limitations of voice recognition software  Interpretation should be guided by context        SVETLANA De León

## 2022-09-28 NOTE — ASSESSMENT & PLAN NOTE
S/p ex lap, sigmoidectomy on 7/2/2022  - dehisce area wound size decreased, 0 3 x 0 3 cm,  improved wound bed, with no obvious sign of infection  - local wound care - changed to Triad paste  - follow up next week

## 2022-10-04 ENCOUNTER — NURSING HOME VISIT (OUTPATIENT)
Dept: WOUND CARE | Facility: HOSPITAL | Age: 72
End: 2022-10-04
Payer: MEDICARE

## 2022-10-04 DIAGNOSIS — Z98.890 S/P EXPLORATORY LAPAROTOMY: Primary | ICD-10-CM

## 2022-10-04 PROCEDURE — 99308 SBSQ NF CARE LOW MDM 20: CPT | Performed by: NURSE PRACTITIONER

## 2022-10-04 NOTE — ASSESSMENT & PLAN NOTE
S/p ex lap, sigmoidectomy on 7/2/2022  - wound is covered with eschar, superficial  - skin prep until eschar fell off  - Sign off  Facility staff will continue to provide treatment and monitor the wound  Can reconsult wound nurse practitioner if wound failed to heal or worsened

## 2022-10-04 NOTE — PROGRESS NOTES
[address] MANAGEMENT   AND HYPERBARIC MEDICINE CENTER       Patient ID: Hiram Faust is a 71 y o  female Date of Birth 1950     Location of Service: [facility]     Performed wound round with: Wound team     Chief Complaint : Abdomen    Wound Instructions:  Abdomen  Cleanse the wound bed with NSS  Apply skin prep to periwound area and wound bed  Daily and prn for soiling    Moisturizing lotion to Bilateral feet daily  Offload all wounds  Turn and reposition frequently  Increase protein intake  Monitor for any sign of infection or worsening, inform PCP or patient's primary physician in your facility  Allergies  Latex and Other      Assessment & Plan:  1  S/P exploratory laparotomy  Assessment & Plan:  S/p ex lap, sigmoidectomy on 7/2/2022  - wound is covered with eschar, superficial  - skin prep until eschar fell off  - Sign off  Facility staff will continue to provide treatment and monitor the wound  Can reconsult wound nurse practitioner if wound failed to heal or worsened  Subjective: This is a 71 y o , female referred to our service for wound/ skin alterations on abdomen, left and right foot and neck  Patient have a complex medical history including but not limited to  has a past medical history of Back pain, Congestive heart failure ([address] ), Depressive disorder, Hypertension, Neurogenic bladder, Open wound of leg, Osteoarthritis, Osteomyelitis ([address] ), Paraplegia ([address] ), Seizure ([address] ), Thrombosis, Ulcer of ankle ([address] ), and Urinary retention    Patient was referred by Senior Care Team  Patient was seen in collaboration with the facility wound team      Wound History: All the wounds was seen by [facility] wound team  The etiology on the right dorsal foot is venous ulcer  The wound on the left foot is pressure ulcer  The wound on the neck is MASD  The wound on the abdomen is surgical incision  Patient hd ex lap, sigmoidelectomy for sigmoid volvolus for ischemia on 7/2/ 2022  Received patient in bed, asleep  No significant issues related to the wound  8/2/2022 Follow up for wound on the abdomen and right and left foot  Received patient, not in distress  Facility staff did not report any significant issues related to the wound  8/9/2022 Follow up for wound on the abdomen   Received patient, not in distress  Facility staff did not report any significant issues related to the wound  As per report, the patient was seen by surgeon and there is new order  8/16/2022 Follow up for wound on the abdomen   Received patient, not in distress  Facility staff did not report any significant issues related to the wound  8/23/2022 Follow up for wound on the abdomen   Received patient, not in distress  Facility staff did not report any significant issues related to the wound  Patient denies pain  Dressing removed : packing strip  8/30/2022 Follow up for wound on the abdomen  Received patient, not in distress  Facility staff did not report any significant issues related to the wound  Denies pain  9/13/2022 Follow up for wound on the abdomen   Received patient, not in distress  Facility staff did not report any significant issues related to the wound  9/20/2022 Follow up for wound on the abdomen   Received patient, not in distress  Facility staff did not report any significant issues related to the wound  Collagen dressing not started due to unavailability  9/27/2022 Follow up for wound on the abdomen   Received patient, not in distress  Facility staff did not report any significant issues related to the wound  Dressing removed - dsd    10/4/2022 Follow up for wound on the abdomen  Received patient, not in distress  Facility staff did not report any significant issues related to the wound  Wound - JORJE  Review of Systems   Reason unable to perform ROS: asleep  Constitutional: Negative  HENT: Negative  Eyes: Negative  Respiratory: Negative  Cardiovascular: Negative for chest pain and leg swelling  Gastrointestinal: Negative  Endocrine: Negative  Genitourinary: Negative  Musculoskeletal: Positive for gait problem  Skin: Positive for wound  See HPI   Neurological: Negative for dizziness and headaches  Psychiatric/Behavioral: Negative for behavioral problems  Objective:    Physical Exam  Constitutional:       Appearance: Normal appearance  HENT:      Head: Normocephalic and atraumatic  Nose: Nose normal       Mouth/Throat:      Pharynx: Oropharynx is clear  Eyes:      Conjunctiva/sclera: Conjunctivae normal    Pulmonary:      Effort: Pulmonary effort is normal    Abdominal:      Tenderness: There is no abdominal tenderness  There is no guarding  Genitourinary:     Comments: With indwelling catheter  Musculoskeletal:         General: No tenderness  Cervical back: Normal range of motion  Right lower leg: No edema  Left lower leg: No edema  Comments: + paraplegia  With moderate amount of edema on BLE   Skin:     Findings: Lesion present  Comments:   Abdomen incision ( dehisced) - wound size is 0 2 x 0 2 cm , 100% eschar, no drainage, no obvious sign of infection,    Neurological:      Mental Status: She is alert  Gait: Gait abnormal    Psychiatric:         Mood and Affect: Mood normal          Behavior: Behavior normal               Procedures           Patient's care was coordinated with nursing facility staff  Recent vitals, labs and updated medications were reviewed on EMR or chart system of facility  Past Medical, surgical, social, medication and allergy history and patient's previous records were reviewed and updated as appropriate: Most up-to date information is available in the facility EMR where the patient is currently admitted      Patient Active Problem List   Diagnosis    Neurogenic bladder    [facility]    Depression    DVT [facility]deep venous thrombosis) ([facility] )  Heart murmur    HTN (hypertension)    Hyperlipidemia    Hyponatremia    Pressure ulcer of contiguous region involving left buttock and hip, unstageable ([facility])    Kidney lesion, native, right    Neurogenic bowel    Paralysis ([address] )    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[address] )    Parkinson's disease ([address] )    Postoperative anemia due to acute blood loss    Seizure disorder ([facility])    Protein-calorie malnutrition (HCC)    Chronic right shoulder pain    CHF (congestive heart failure) ([facility])    Septic arthritis of hip ([facility])    Atrial fibrillation with RVR ([facility])    Chronic anticoagulation    Pressure injury of 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 ([facility])    Loose stools    Anemia due to chronic illness    Indigestion    Dehydration    Deep tissue injury    Open wound    Venous ulcer ([facility])    Pressure injury of left foot, unstageable ([address] )    S/P exploratory laparotomy     Past Medical History:   Diagnosis Date    Back pain     Congestive heart failure ([facility])     Depressive disorder     Hypertension     Neurogenic bladder     Open wound of leg     Osteoarthritis     Osteomyelitis ([address] )     Paraplegia ([address] )     Seiz[facility]     Thrombosis     Ulcer of ankle ([facility] )     Urinary retention      Past Surgical History:   Procedure Laterality Date    CYSTOSCOPY      SKIN GRAFT       Social History     Socioeconomic History    Marital status: /Civil Union     Spouse name: None    Number of children: None    Years of education: None    Highest education level: None   Occupational History    None   Tobacco Use    Smoking status: Never Smoker    Smokeless tobacco: Never Used   Substance and Sexual Activity    Alcohol use: No    Drug use: No    Sexual activity: None   Other Topics Concern    None   Social History Narrative    None     Social Determinants of Health     Financial Resource Strain: Not on file   Food Insecurity: Not on file   Transportation Needs: Not on file   Physical Activity: Not on file   Stress: Not on file   Social Connections: Not on file   Intimate Partner Violence: Not on file   Housing Stability: Not on file        Current Outpatient Medications:     acetaminophen (TYLENOL) 325 mg tablet, Take 650 mg by mouth every 4 (four) hours as needed, Disp: , Rfl:     amLODIPine-benazepril (LOTREL) 10-20 MG per capsule, Take by mouth , Disp: , Rfl:     atorvastatin (LIPITOR) 20 mg tablet, Take 20 mg by mouth daily , Disp: , Rfl:     Biotin 10 MG CAPS, Take 10 mg by mouth daily, Disp: , Rfl:     bisacodyl (Dulcolax) 10 mg suppository, Insert 10 mg into the rectum daily, Disp: , Rfl:     carbidopa-levodopa (SINEMET)  mg per tablet, Take 1 tablet by mouth 4 (four) times a day, Disp: , Rfl:     Cranberry 450 MG TABS, Take 450 mg by mouth, Disp: , Rfl:     famotidine (PEPCID) 20 mg tablet, Take 20 mg by mouth 2 (two) times a day, Disp: , Rfl:     furosemide (LASIX) 20 mg tablet, Take 20 mg by mouth daily, Disp: , Rfl:     magnesium hydroxide (MILK OF MAGNESIA) 400 mg/5 mL oral suspension, Take by mouth daily as needed for constipation, Disp: , Rfl:     metoprolol tartrate (LOPRESSOR) 50 mg tablet, Take 50 mg by mouth 2 (two) times a day, Disp: , Rfl:     mirtazapine (REMERON) 15 mg tablet, Take 7 5 mg by mouth daily at bedtime, Disp: , Rfl:     Multiple Vitamin (DAILY VALUE MULTIVITAMIN) TABS, Take 1 tablet by mouth daily , Disp: , Rfl:     Multiple Vitamins-Minerals (HAIR SKIN NAILS PO), Take 3 tablets by mouth daily, Disp: , Rfl:     Nutritional Supplements (Micha) PACK, Take by mouth, Disp: , Rfl:     Omega-3 Fatty Acids (FISH OIL) 1,000 mg, Take 1,000 mg by mouth daily , Disp: , Rfl:     oxyCODONE (ROXICODONE) 5 mg immediate release tablet, Take 1 tablet (5 mg total) by mouth every 6 (six) hours as needed for moderate painMax Daily Amount: 20 mg, Disp: 15 tablet, Rfl: 0    phenytoin (DILANTIN) 100 mg ER capsule, Take 100 mg by mouth every 12 (twelve) hours , Disp: , Rfl:     potassium chloride (K-DUR,KLOR-CON) 10 mEq tablet, , Disp: , Rfl:     psyllium (Metamucil) 0 52 g capsule, Take 0 52 g by mouth daily, Disp: , Rfl:     saccharomyces boulardii (Florastor) 250 mg capsule, Take 250 mg by mouth 2 (two) times a day, Disp: , Rfl:     senna (SENOKOT) 8 6 MG tablet, Take 8 6 mg by mouth 2 (two) times a day, Disp: , Rfl:     sertraline (ZOLOFT) 25 mg tablet, Take 100 mg by mouth daily, Disp: , Rfl:     sodium phosphate-biphosphate (FLEET) 7-19 g 118 mL enema, Insert 1 enema into the rectum, Disp: , Rfl:     vitamin B-12 (CYANOCOBALAMIN) 250 MCG tablet, Take 500 mcg by mouth daily, Disp: , Rfl:     warfarin (COUMADIN) 6 mg tablet, Take 6 mg by mouth daily, Disp: , Rfl:   Family History   Problem Relation Age of Onset    Diabetes Father     Kidney cancer Father     Alzheimer's disease Mother     Bone cancer Paternal Uncle     Lung cancer Paternal Uncle               Coordination of Care: Wound team aware of the treatment plan  Facility nurse will provide wound treatment and monitor the wound for any changes  Patient / Staff education : Staff was given education on sign of infection and pressure ulcer prevention  Staff verbalized understanding     Follow up :  Sign off    Voice-recognition software may have been used in the preparation of this document  Occasional wrong word or "sound-alike" substitutions may have occurred due to the inherent limitations of voice recognition software  Interpretation should be guided by context        Daniel Robles

## 2022-10-11 ENCOUNTER — NURSING HOME VISIT (OUTPATIENT)
Dept: WOUND CARE | Facility: HOSPITAL | Age: 72
End: 2022-10-11
Payer: MEDICARE

## 2022-10-11 DIAGNOSIS — Z98.890 S/P EXPLORATORY LAPAROTOMY: Primary | ICD-10-CM

## 2022-10-11 PROCEDURE — 99308 SBSQ NF CARE LOW MDM 20: CPT | Performed by: NURSE PRACTITIONER

## 2022-10-11 NOTE — PROGRESS NOTES
[address] MANAGEMENT   AND HYPERBARIC MEDICINE CENTER       Patient ID: Yuniel Almazan is a 71 y o  female Date of Birth 1950     [facility]    Performed wound round with: Wound team     Chief Complaint : Abdomen    Wound Instructions:  Abdomen  D/c wound order    Moisturizing lotion to Bilateral feet daily  Offload all wounds  Turn and reposition frequently  Increase protein intake  Monitor for any sign of infection or worsening, inform PCP or patient's primary physician in your facility  Allergies  Latex and Other      Assessment & Plan:  1  S/P exploratory laparotomy  Assessment & Plan:  S/p ex lap, sigmoidectomy on 7/2/2022  - wound - healed  - d/c wound order  - Sign off  Subjective: This is a 71 y o , female referred to our service for wound/ skin alterations on abdomen, left and right foot and neck  Patient have a complex medical history including but not limited to  has a past medical history of Back pain, Congestive heart failure ([address] ), Depressive disorder, Hypertension, Neurogenic bladder, Open wound of leg, Osteoarthritis, Osteomyelitis ([address] ), Paraplegia ([address] ), Seizure ([address] ), Thrombosis, Ulcer of ankle ([address] ), and Urinary retention    Patient was referred by Senior Care Team  Patient was seen in collaboration with the facility wound team      Wound History: All the wounds was seen by [facility] wound team  The etiology on the right dorsal foot is venous ulcer  The wound on the left foot is pressure ulcer  The wound on the neck is MASD  The wound on the abdomen is surgical incision  Patient hd ex lap, sigmoidelectomy for sigmoid volvolus for ischemia on 7/2/ 2022  Received patient in bed, asleep  No significant issues related to the wound  8/2/2022 Follow up for wound on the abdomen and right and left foot  Received patient, not in distress  Facility staff did not report any significant issues related to the wound       8/9/2022 Follow up for wound on the abdomen   Received patient, not in distress  Facility staff did not report any significant issues related to the wound  As per report, the patient was seen by surgeon and there is new order  8/16/2022 Follow up for wound on the abdomen   Received patient, not in distress  Facility staff did not report any significant issues related to the wound  8/23/2022 Follow up for wound on the abdomen   Received patient, not in distress  Facility staff did not report any significant issues related to the wound  Patient denies pain  Dressing removed : packing strip  8/30/2022 Follow up for wound on the abdomen  Received patient, not in distress  Facility staff did not report any significant issues related to the wound  Denies pain  9/13/2022 Follow up for wound on the abdomen   Received patient, not in distress  Facility staff did not report any significant issues related to the wound  9/20/2022 Follow up for wound on the abdomen   Received patient, not in distress  Facility staff did not report any significant issues related to the wound  Collagen dressing not started due to unavailability  9/27/2022 Follow up for wound on the abdomen   Received patient, not in distress  Facility staff did not report any significant issues related to the wound  Dressing removed - dsd    10/4/2022 Follow up for wound on the abdomen  Received patient, not in distress  Facility staff did not report any significant issues related to the wound  Wound - JORJE  10/11/2022 Follow up for wound on the abdomen   Received patient, not in distress  Facility staff did not report any significant issues related to the wound  Review of Systems   Reason unable to perform ROS: asleep  Constitutional: Negative  HENT: Negative  Eyes: Negative  Respiratory: Negative  Cardiovascular: Negative for chest pain and leg swelling  Gastrointestinal: Negative  Endocrine: Negative      Genitourinary: Negative  Musculoskeletal: Positive for gait problem  Skin: Positive for wound  See HPI   Neurological: Negative for dizziness and headaches  Psychiatric/Behavioral: Negative for behavioral problems  Objective:    Physical Exam  Constitutional:       Appearance: Normal appearance  HENT:      Head: Normocephalic and atraumatic  Nose: Nose normal       Mouth/Throat:      Pharynx: Oropharynx is clear  Eyes:      Conjunctiva/sclera: Conjunctivae normal    Pulmonary:      Effort: Pulmonary effort is normal    Abdominal:      Tenderness: There is no abdominal tenderness  There is no guarding  Genitourinary:     Comments: With indwelling catheter  Musculoskeletal:         General: No tenderness  Cervical back: Normal range of motion  Right lower leg: No edema  Left lower leg: No edema  Comments: + paraplegia  With moderate amount of edema on BLE   Skin:     Findings: Lesion present  Comments:   Abdomen incision ( dehisced) - wound healed   Neurological:      Mental Status: She is alert  Gait: Gait abnormal    Psychiatric:         Mood and Affect: Mood normal          Behavior: Behavior normal               Procedures           Patient's care was coordinated with nursing facility staff  Recent vitals, labs and updated medications were reviewed on EMR or chart system of facility  Past Medical, surgical, social, medication and allergy history and patient's previous records were reviewed and updated as appropriate: Most up-to date information is available in the facility EMR where the patient is currently admitted      Patient Active Problem List   Diagnosis   • Neurogenic bladder   • Chronic osteomyelitis (HCC)   • Depression   • DVT (deep venous thrombosis) ([facility])   • Heart murmur   • HTN (hypertension)   • Hyperlipidemia   • Hyponatremia   • Pressure ulcer of contiguous region involving left buttock and hip, unstageable ([facility] )   • Kidney lesion, native, right   • Neurogenic bowel   • Paralysis (HCC)   • 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[facility] )   • Parkinson's disease ([facility] )   • Postoperative anemia due to acute blood loss   • Seizure disorder (HCC)   • Protein-calorie malnutrition (HCC)   • Chronic right shoulder pain   • CHF (congestive heart failure) (HCC)   • Septic arthritis of hip (HCC)   • Atrial fibrillation with RVR (HCC)   • Chronic anticoagulation   • Pressure injury of 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 (HCC)   • Loose stools   • Anemia due to chronic illness   • Indigestion   • Dehydration   • Deep tissue injury   • Open wound   • Venous ulcer (HCC)   • Pressure injury of left foot, unstageable (HCC)   • S/P exploratory laparotomy     Past Medical History:   Diagnosis Date   • Back pain    • Congestive heart failure (HCC)    • Depressive disorder    • Hypertension    • Neurogenic bladder    • Open wound of leg    • Osteoarthritis    • Osteomyelitis (HCC)    • Paraplegia (HCC)    • Seizure (HCC)    • Thrombosis    • Ulcer of ankle ([facility] )    • Urinary retention      Past Surgical History:   Procedure Laterality Date   • CYSTOSCOPY     • SKIN GRAFT       Social History     Socioeconomic History   • Marital status: /Civil Union     Spouse name: None   • Number of children: None   • Years of education: None   • Highest education level: None   Occupational History   • None   Tobacco Use   • Smoking status: Never Smoker   • Smokeless tobacco: Never Used   Substance and Sexual Activity   • Alcohol use: No   • Drug use: No   • Sexual activity: None   Other Topics Concern   • None   Social History Narrative   • None     Social Determinants of Health     Financial Resource Strain: Not on file   Food Insecurity: Not on file   Transportation Needs: Not on file   Physical Activity: Not on file   Stress: Not on file   Social Connections: Not on file   Intimate Partner Violence: Not on file   Housing Stability: Not on file        Current Outpatient Medications:   •  acetaminophen (TYLENOL) 325 mg tablet, Take 650 mg by mouth every 4 (four) hours as needed, Disp: , Rfl:   •  amLODIPine-benazepril (LOTREL) 10-20 MG per capsule, Take by mouth , Disp: , Rfl:   •  atorvastatin (LIPITOR) 20 mg tablet, Take 20 mg by mouth daily , Disp: , Rfl:   •  Biotin 10 MG CAPS, Take 10 mg by mouth daily, Disp: , Rfl:   •  bisacodyl (Dulcolax) 10 mg suppository, Insert 10 mg into the rectum daily, Disp: , Rfl:   •  carbidopa-levodopa (SINEMET)  mg per tablet, Take 1 tablet by mouth 4 (four) times a day, Disp: , Rfl:   •  Cranberry 450 MG TABS, Take 450 mg by mouth, Disp: , Rfl:   •  famotidine (PEPCID) 20 mg tablet, Take 20 mg by mouth 2 (two) times a day, Disp: , Rfl:   •  furosemide (LASIX) 20 mg tablet, Take 20 mg by mouth daily, Disp: , Rfl:   •  magnesium hydroxide (MILK OF MAGNESIA) 400 mg/5 mL oral suspension, Take by mouth daily as needed for constipation, Disp: , Rfl:   •  metoprolol tartrate (LOPRESSOR) 50 mg tablet, Take 50 mg by mouth 2 (two) times a day, Disp: , Rfl:   •  mirtazapine (REMERON) 15 mg tablet, Take 7 5 mg by mouth daily at bedtime, Disp: , Rfl:   •  Multiple Vitamin (DAILY VALUE MULTIVITAMIN) TABS, Take 1 tablet by mouth daily , Disp: , Rfl:   •  Multiple Vitamins-Minerals (HAIR SKIN NAILS PO), Take 3 tablets by mouth daily, Disp: , Rfl:   •  Nutritional Supplements (Micha) PACK, Take by mouth, Disp: , Rfl:   •  Omega-3 Fatty Acids (FISH OIL) 1,000 mg, Take 1,000 mg by mouth daily , Disp: , Rfl:   •  oxyCODONE (ROXICODONE) 5 mg immediate release tablet, Take 1 tablet (5 mg total) by mouth every 6 (six) hours as needed for moderate painMax Daily Amount: 20 mg, Disp: 15 tablet, Rfl: 0  •  phenytoin (DILANTIN) 100 mg ER capsule, Take 100 mg by mouth every 12 (twelve) hours , Disp: , Rfl:   •  potassium chloride (K-DUR,KLOR-CON) 10 mEq tablet, , Disp: , Rfl:   •  psyllium (Metamucil) 0 52 g capsule, Take 0 52 g by mouth daily, Disp: , Rfl:   •  saccharomyces boulardii (Florastor) 250 mg capsule, Take 250 mg by mouth 2 (two) times a day, Disp: , Rfl:   •  senna (SENOKOT) 8 6 MG tablet, Take 8 6 mg by mouth 2 (two) times a day, Disp: , Rfl:   •  sertraline (ZOLOFT) 25 mg tablet, Take 100 mg by mouth daily, Disp: , Rfl:   •  sodium phosphate-biphosphate (FLEET) 7-19 g 118 mL enema, Insert 1 enema into the rectum, Disp: , Rfl:   •  vitamin B-12 (CYANOCOBALAMIN) 250 MCG tablet, Take 500 mcg by mouth daily, Disp: , Rfl:   •  warfarin (COUMADIN) 6 mg tablet, Take 6 mg by mouth daily, Disp: , Rfl:   Family History   Problem Relation Age of Onset   • Diabetes Father    • Kidney cancer Father    • Alzheimer's disease Mother    • Bone cancer Paternal Uncle    • Lung cancer Paternal Uncle               Coordination of Care: Wound team aware of the treatment plan  Facility nurse will provide wound treatment and monitor the wound for any changes  Patient / Staff education : Staff was given education on sign of infection and pressure ulcer prevention  Staff verbalized understanding     Follow up :  Sign off    Voice-recognition software may have been used in the preparation of this document  Occasional wrong word or "sound-alike" substitutions may have occurred due to the inherent limitations of voice recognition software  Interpretation should be guided by isatu Owen

## 2022-10-12 PROBLEM — E86.0 DEHYDRATION: Status: RESOLVED | Noted: 2021-05-05 | Resolved: 2022-10-12

## 2023-01-10 ENCOUNTER — NURSING HOME VISIT (OUTPATIENT)
Dept: WOUND CARE | Facility: HOSPITAL | Age: 73
End: 2023-01-10

## 2023-01-10 DIAGNOSIS — Z98.890 S/P EXPLORATORY LAPAROTOMY: Primary | ICD-10-CM

## 2023-01-10 NOTE — PROGRESS NOTES
[address] MANAGEMENT   AND [facility]PER[facility]IC [facility]INE [facility]ENTER       Patient ID: Farrukh Lizarraga is a 71 y o  female Date of Birth 1950     Location of Service: [facility]     Performed wound round with: Wound team     Chief Complaint : Abdomen    Wound Instructions:  Abdomen  D/c wound order    Moisturizing lotion to Bilateral feet daily  Offload all wounds  Turn and reposition frequently  Increase protein intake  Monitor for any sign of infection or worsening, inform PCP or patient's primary physician in your facility  Allergies  Latex and Other      Assessment & Plan:  1  S/P exploratory laparotomy  Assessment & Plan:  S/p ex lap, sigmoidectomy on 7/2/2022  -Wound was healed on October, 2022  As per report, the wound reopened last week  -Wound is healed during visit  -Sign off  Facility staff will continue to provide treatment and monitor the wound  Can reconsult wound nurse practitioner if wound failed to heal or worsened  Subjective: This is a 71 y o , female referred to our service for wound/ skin alterations on abdomen, left and right foot and neck  Patient have a complex medical history including but not limited to  has a past medical history of Back pain, Congestive heart failure ([address] ), Depressive disorder, Hypertension, Neurogenic bladder, Open wound of leg, Osteoarthritis, Osteomyelitis ([address] ), Paraplegia ([address] ), Seizure ([address] ), Thrombosis, Ulcer of ankle ([address] ), and Urinary retention    Patient was referred by Senior Care Team  Patient was seen in collaboration with the facility wound team      Wound History: All the wounds was seen by [facility] wound team  The etiology on the right dorsal foot is venous ulcer  The wound on the left foot is pressure ulcer  The wound on the neck is MASD  The wound on the abdomen is surgical incision  Patient hd ex lap, sigmoidelectomy for sigmoid volvolus for ischemia on 7/2/ 2022  Received patient in bed, asleep    No significant issues related to the wound  8/2/2022 Follow up for wound on the abdomen and right and left foot  Received patient, not in distress  Facility staff did not report any significant issues related to the wound  8/9/2022 Follow up for wound on the abdomen   Received patient, not in distress  Facility staff did not report any significant issues related to the wound  As per report, the patient was seen by surgeon and there is new order  8/16/2022 Follow up for wound on the abdomen   Received patient, not in distress  Facility staff did not report any significant issues related to the wound  8/23/2022 Follow up for wound on the abdomen   Received patient, not in distress  Facility staff did not report any significant issues related to the wound  Patient denies pain  Dressing removed : packing strip  8/30/2022 Follow up for wound on the abdomen  Received patient, not in distress  Facility staff did not report any significant issues related to the wound  Denies pain  9/13/2022 Follow up for wound on the abdomen   Received patient, not in distress  Facility staff did not report any significant issues related to the wound  9/20/2022 Follow up for wound on the abdomen   Received patient, not in distress  Facility staff did not report any significant issues related to the wound  Collagen dressing not started due to unavailability  9/27/2022 Follow up for wound on the abdomen   Received patient, not in distress  Facility staff did not report any significant issues related to the wound  Dressing removed - dsd    10/4/2022 Follow up for wound on the abdomen  Received patient, not in distress  Facility staff did not report any significant issues related to the wound  Wound - JORJE  10/11/2022 Follow up for wound on the abdomen   Received patient, not in distress  Facility staff did not report any significant issues related to the wound  1/10/2023 new consult for the wound on the abdomen  Received patient, not in distress  As per report, the surgical incision on the patient's abdomen dehisced last week  Current treatment is silver alginate  Review of Systems   Reason unable to perform ROS: asleep  Constitutional: Negative  HENT: Negative  Eyes: Negative  Respiratory: Negative  Cardiovascular: Negative for chest pain and leg swelling  Gastrointestinal: Negative  Endocrine: Negative  Genitourinary: Negative  Musculoskeletal: Positive for gait problem  Skin: Positive for wound  See HPI   Neurological: Negative for dizziness and headaches  Psychiatric/Behavioral: Negative for behavioral problems  Objective:    Physical Exam  Constitutional:       Appearance: Normal appearance  HENT:      Head: Normocephalic and atraumatic  Nose: Nose normal       Mouth/Throat:      Pharynx: Oropharynx is clear  Eyes:      Conjunctiva/sclera: Conjunctivae normal    Pulmonary:      Effort: Pulmonary effort is normal    Abdominal:      Tenderness: There is no abdominal tenderness  There is no guarding  Genitourinary:     Comments: With indwelling catheter  Musculoskeletal:         General: No tenderness  Cervical back: Normal range of motion  Right lower leg: No edema  Left lower leg: No edema  Comments: + paraplegia   Skin:     Findings: Lesion present  Comments:   Abdomen incision ( dehisced) -the wound is healed, no obvious sign of infection   Neurological:      Mental Status: She is alert  Gait: Gait abnormal    Psychiatric:         Mood and Affect: Mood normal          Behavior: Behavior normal               Procedures           Patient's care was coordinated with nursing facility staff  Recent vitals, labs and updated medications were reviewed on EMR or chart system of facility   Past Medical, surgical, social, medication and allergy history and patient's previous records were reviewed and updated as appropriate: Most up-to date information is available in the facility EMR where the patient is currently admitted      Patient Active Problem List   Diagnosis   • Neurogenic bladder   • Chronic osteomyelitis (HCC)   • Depression   • DVT (deep venous thrombosis) ([facility])   • Heart murmur   • HTN (hypertension)   • Hyperlipidemia   • Hyponatremia   • Pressure ulcer of contiguous region involving left buttock and hip, unstageable ([facility])   • Kidney lesion, native, right   • Neurogenic bowel   • Paralysis ([address] )   • 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[address] )   • Parkinson's disease ([address] )   • Postoperative anemia due to acute blood loss   • Seizure disorder ([facility])   • Protein-calorie malnutrition ([facility])   • Chronic right shoulder pain   • CHF (congestive heart failure) ([facility])   • Septic arthritis of hip ([facility])   • Atrial fibrillation with RVR ([facility])   • Chronic anticoagulation   • Pressure injury of 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 ([facility])   • Loose stools   • Anemia due to chronic illness   • Indigestion   • Deep tissue injury   • Open wound   • Venous ulcer ([facility] )   • Pressure injury of left foot, unstageable ([address] )   • S/P exploratory laparotomy     Past Medical History:   Diagnosis Date   • Back pain    • Congestive heart failure (HCC)    • Depressive disorder    • Hypertension    • Neurogenic bladder    • Open wound of leg    • Osteoarthritis    • Osteomyelitis ([facility])    • Paraplegia ([facility])    • Seizure ([address] )    • Thrombosis    • Ulcer of ankle ([facility] )    • Urinary retention      Past Surgical History:   Procedure Laterality Date   • CYSTOSCOPY     • SKIN GRAFT       Social History     Socioeconomic History   • Marital status: /Civil Union     Spouse name: None   • Number of children: None   • Years of education: None   • Highest education level: None   Occupational History   • None   Tobacco Use   • Smoking status: Never   • Smokeless tobacco: Never   Substance and Sexual Activity   • Alcohol use: No   • Drug use: No   • Sexual activity: None   Other Topics Concern   • None   Social History Narrative   • None     Social Determinants of Health     Financial Resource Strain: Not on file   Food Insecurity: Not on file   Transportation Needs: Not on file   Physical Activity: Not on file   Stress: Not on file   Social Connections: Not on file   Intimate Partner Violence: Not on file   Housing Stability: Not on file        Current Outpatient Medications:   •  acetaminophen (TYLENOL) 325 mg tablet, Take 650 mg by mouth every 4 (four) hours as needed, Disp: , Rfl:   •  amLODIPine-benazepril (LOTREL) 10-20 MG per capsule, Take by mouth , Disp: , Rfl:   •  atorvastatin (LIPITOR) 20 mg tablet, Take 20 mg by mouth daily , Disp: , Rfl:   •  Biotin 10 MG CAPS, Take 10 mg by mouth daily, Disp: , Rfl:   •  bisacodyl (Dulcolax) 10 mg suppository, Insert 10 mg into the rectum daily, Disp: , Rfl:   •  carbidopa-levodopa (SINEMET)  mg per tablet, Take 1 tablet by mouth 4 (four) times a day, Disp: , Rfl:   •  Cranberry 450 MG TABS, Take 450 mg by mouth, Disp: , Rfl:   •  famotidine (PEPCID) 20 mg tablet, Take 20 mg by mouth 2 (two) times a day, Disp: , Rfl:   •  furosemide (LASIX) 20 mg tablet, Take 20 mg by mouth daily, Disp: , Rfl:   •  magnesium hydroxide (MILK OF MAGNESIA) 400 mg/5 mL oral suspension, Take by mouth daily as needed for constipation, Disp: , Rfl:   •  metoprolol tartrate (LOPRESSOR) 50 mg tablet, Take 50 mg by mouth 2 (two) times a day, Disp: , Rfl:   •  mirtazapine (REMERON) 15 mg tablet, Take 7 5 mg by mouth daily at bedtime, Disp: , Rfl:   •  Multiple Vitamin (DAILY VALUE MULTIVITAMIN) TABS, Take 1 tablet by mouth daily , Disp: , Rfl:   •  Multiple Vitamins-Minerals (HAIR SKIN NAILS PO), Take 3 tablets by mouth daily, Disp: , Rfl:   •  Nutritional Supplements (Micha) PACK, Take by mouth, Disp: , Rfl:   •  Omega-3 Fatty Acids (FISH OIL) 1,000 mg, Take 1,000 mg by mouth daily , Disp: , Rfl:   •  oxyCODONE (ROXICODONE) 5 mg immediate release tablet, Take 1 tablet (5 mg total) by mouth every 6 (six) hours as needed for moderate painMax Daily Amount: 20 mg, Disp: 15 tablet, Rfl: 0  •  phenytoin (DILANTIN) 100 mg ER capsule, Take 100 mg by mouth every 12 (twelve) hours , Disp: , Rfl:   •  potassium chloride (K-DUR,KLOR-CON) 10 mEq tablet, , Disp: , Rfl:   •  psyllium (Metamucil) 0 52 g capsule, Take 0 52 g by mouth daily, Disp: , Rfl:   •  saccharomyces boulardii (Florastor) 250 mg capsule, Take 250 mg by mouth 2 (two) times a day, Disp: , Rfl:   •  senna (SENOKOT) 8 6 MG tablet, Take 8 6 mg by mouth 2 (two) times a day, Disp: , Rfl:   •  sertraline (ZOLOFT) 25 mg tablet, Take 100 mg by mouth daily, Disp: , Rfl:   •  sodium phosphate-biphosphate (FLEET) 7-19 g 118 mL enema, Insert 1 enema into the rectum, Disp: , Rfl:   •  vitamin B-12 (CYANOCOBALAMIN) 250 MCG tablet, Take 500 mcg by mouth daily, Disp: , Rfl:   •  warfarin (COUMADIN) 6 mg tablet, Take 6 mg by mouth daily, Disp: , Rfl:   Family History   Problem Relation Age of Onset   • Diabetes Father    • Kidney cancer Father    • Alzheimer's disease Mother    • Bone cancer Paternal Uncle    • Lung cancer Paternal Uncle               Coordination of Care: Wound team aware of the treatment plan  Facility nurse will provide wound treatment and monitor the wound for any changes  Patient / Staff education : Staff was given education on sign of infection and pressure ulcer prevention  Staff verbalized understanding     Follow up :  Sign off    Voice-recognition software may have been used in the preparation of this document  Occasional wrong word or "sound-alike" substitutions may have occurred due to the inherent limitations of voice recognition software  Interpretation should be guided by isatu Mark

## 2023-01-10 NOTE — ASSESSMENT & PLAN NOTE
S/p ex lap, sigmoidectomy on 7/2/2022  -Wound was healed on October, 2022  As per report, the wound reopened last week  -Wound is healed during visit  -Sign off  Facility staff will continue to provide treatment and monitor the wound  Can reconsult wound nurse practitioner if wound failed to heal or worsened

## 2023-01-17 ENCOUNTER — NURSING HOME VISIT (OUTPATIENT)
Dept: WOUND CARE | Facility: HOSPITAL | Age: 73
End: 2023-01-17

## 2023-01-17 DIAGNOSIS — L97.909 ARTERIAL LEG ULCER (HCC): ICD-10-CM

## 2023-01-17 DIAGNOSIS — Z98.890 S/P EXPLORATORY LAPAROTOMY: Primary | ICD-10-CM

## 2023-01-17 DIAGNOSIS — L89.303 PRESSURE INJURY OF BUTTOCK, STAGE 3, UNSPECIFIED LATERALITY (HCC): ICD-10-CM

## 2023-01-18 PROBLEM — L89.303 PRESSURE INJURY OF BUTTOCK, STAGE 3 (HCC): Status: ACTIVE | Noted: 2023-01-18

## 2023-01-18 PROBLEM — L97.909 ARTERIAL LEG ULCER (HCC): Status: ACTIVE | Noted: 2023-01-18

## 2023-01-18 NOTE — PROGRESS NOTES
[address] MANAGEMENT   AND HYPERBARIC MEDICINE CENTER       Patient ID: Lilly Guo is a 71 y o  female Date of Birth 1950     Location of Service: [place] [id number]    Performed wound round with: Wound team     Chief Complaint : Buttocks, abdomen, and left great toe    Wound Instructions:  Wound: Buttocks  Discontinue previous wound order  Cleanse the wound bed with NSS   Apply non-sting skin prep to periwound area  Apply Triad paste to wound bed, then cover with bordered foam  Frequency : Daily and prn for soiling    Abdomen wound -leave open to air    Left great toe -apply Skin-Prep daily, leave open to air  Offload all wounds  Turn and reposition frequently, maximum of every two hours  Instruct / Assist with weight shifting every 15 - 20 minutes when in chair  Increase protein intake  Monitor for any sign of infection or worsening, inform PCP or patient's primary physician in your facility  Allergies  Latex and Other      Assessment & Plan:  1  S/P exploratory laparotomy  Assessment & Plan:  S/p ex lap, sigmoidectomy on 7/2/2022  -Wound was healed on October, 2022  As per report, the wound reopened   -100% scab, seems superficial  -Continue to monitor      2  Arterial leg ulcer (HCC)  Assessment & Plan:  Left great toe  · Purple, nonblanchable  · Local wound care with Skin-Prep  · Continue to offload      3  Pressure injury of buttock, stage 3, unspecified laterality (HCC)  Assessment & Plan:  Buttocks  · Full-thickness, with purple nonblanchable area, no obvious sign of infection  · I ordered Triad paste and bordered foam   However as per report, patient has been refusing daily bordered foam   Without bordered foam, this wound can worsen due to possible constant shearing  Patient spent most of her time in bed, flat in bed  Patient is on air mattress bed  · Increase protein intake, patient have a good appetite  · Follow-up next week             Subjective:   January 17, 2023  This is a new referral for wound on the buttocks, toes, and abdomen  Patient was referred by the geriatric team   As per report, patient was recently sent in [facility] acute care on January 10, 2023 for nausea and vomiting and while in the acute care, she developed pressure injury on the buttocks  Patient also admitted with a wound on the abdomen and left great toe  Received patient in bed, seems comfortable  Denies pain  The wound on abdomen was an old surgical wound that dehisced  The wound on the left great toe is an arterial wound  Patient spends most of her time in bed, she has a good appetite  Patient have indwelling catheter  Review of Systems   Constitutional: Negative  HENT: Negative  Eyes: Negative  Respiratory: Negative  Cardiovascular: Negative for chest pain and leg swelling  Gastrointestinal: Negative  Endocrine: Negative  Genitourinary: Negative  Musculoskeletal: Positive for gait problem  Skin: Positive for wound  See HPI   Neurological: Negative for dizziness and headaches  Psychiatric/Behavioral: Positive for confusion  Negative for behavioral problems  Objective:    Physical Exam  Constitutional:       Appearance: Normal appearance  HENT:      Head: Normocephalic  Nose: Nose normal       Mouth/Throat:      Pharynx: Oropharynx is clear  Eyes:      Conjunctiva/sclera: Conjunctivae normal    Cardiovascular:      Rate and Rhythm: Normal rate  Pulmonary:      Effort: Pulmonary effort is normal    Abdominal:      Tenderness: There is no abdominal tenderness  There is no guarding  Genitourinary:     Comments: With indwelling catheter  Musculoskeletal:         General: No tenderness  Cervical back: Normal range of motion  Right lower leg: No edema  Left lower leg: No edema  Comments: LROM  paraplegia   Skin:     Findings: Lesion present        Comments: Buttocks -wound size is 3 x 3 x 0 1 cm ,  50% granulation, 50% purple, nonblanchable, periwound is macerated, small amount of serous drainage, no obvious sign of infection    Left great toe wound size is 2 x 0 5 cm ,  100% eschar, no drainage, no obvious sign of infection    Abdomen: Wound size is 0 5 x 0 5 cm ,  100% scab, no drainage, seems superficial, no obvious sign of infection denies pain   Neurological:      Mental Status: She is alert  Gait: Gait abnormal    Psychiatric:         Mood and Affect: Mood normal          Behavior: Behavior normal               Procedures           Patient's care was coordinated with nursing facility staff  Recent vitals, labs and updated medications were reviewed on EMR or chart system of facility  Past Medical, surgical, social, medication and allergy history and patient's previous records were reviewed and updated as appropriate: Most up-to date information is available in the facility EMR where the patient is currently admitted      Patient Active Problem List   Diagnosis   • Neurogenic bladder   • Chronic osteomyelitis ([facility])   • Depression   • DVT (deep venous thrombosis) ([facility])   • Heart murmur   • HTN (hypertension)   • Hyperlipidemia   • Hyponatremia   • Pressure ulcer of contiguous region involving left buttock and hip, unstageable ([address] )   • Kidney lesion, native, right   • Neurogenic bowel   • Paralysis ([address] )   • 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[address] )   • Parkinson's disease ([address] )   • Postoperative anemia due to acute blood loss   • Seizure disorder ([facility])   • Protein-calorie malnutrition ([facility])   • Chronic right shoulder pain   • CHF (congestive heart failure) ([facility])   • Septic arthritis of hip ([facility])   • Atrial fibrillation with RVR ([facility])   • Chronic anticoagulation   • Pressure injury of 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 ([facility])   • Loose stools   • Anemia due to chronic illness   • Indigestion   • Deep tissue injury   • Open wound   • Venous ulcer ([address] )   • Pressure injury of left foot, unstageable ([address] )   • S/P exploratory laparotomy   • Arterial leg ulcer (HCC)   • Pressure injury of buttock, stage 3 (HCC)     Past Medical History:   Diagnosis Date   • Back pain    • Congestive heart failure (HCC)    • Depressive disorder    • Hypertension    • Neurogenic bladder    • Open wound of leg    • Osteoarthritis    • Osteomyelitis (HCC)    • Paraplegia (HCC)    • Seizure (HCC)    • Thrombosis    • Ulcer of ankle ([facility] )    • Urinary retention      Past Surgical History:   Procedure Laterality Date   • CYSTOSCOPY     • SKIN GRAFT       Social History     Socioeconomic History   • Marital status: /Civil Union     Spouse name: None   • Number of children: None   • Years of education: None   • Highest education level: None   Occupational History   • None   Tobacco Use   • Smoking status: Never   • Smokeless tobacco: Never   Substance and Sexual Activity   • Alcohol use: No   • Drug use: No   • Sexual activity: None   Other Topics Concern   • None   Social History Narrative   • None     Social Determinants of Health     Financial Resource Strain: Not on file   Food Insecurity: Not on file   Transportation Needs: Not on file   Physical Activity: Not on file   Stress: Not on file   Social Connections: Not on file   Intimate Partner Violence: Not on file   Housing Stability: Not on file        Current Outpatient Medications:   •  acetaminophen (TYLENOL) 325 mg tablet, Take 650 mg by mouth every 4 (four) hours as needed, Disp: , Rfl:   •  amLODIPine-benazepril (LOTREL) 10-20 MG per capsule, Take by mouth , Disp: , Rfl:   •  atorvastatin (LIPITOR) 20 mg tablet, Take 20 mg by mouth daily , Disp: , Rfl:   •  Biotin 10 MG CAPS, Take 10 mg by mouth daily, Disp: , Rfl:   •  bisacodyl (Dulcolax) 10 mg suppository, Insert 10 mg into the rectum daily, Disp: , Rfl:   •  carbidopa-levodopa (SINEMET)  mg per tablet, Take 1 tablet by mouth 4 (four) times a day, Disp: , Rfl:   •  Cranberry 450 MG TABS, Take 450 mg by mouth, Disp: , Rfl:   •  famotidine (PEPCID) 20 mg tablet, Take 20 mg by mouth 2 (two) times a day, Disp: , Rfl:   •  furosemide (LASIX) 20 mg tablet, Take 20 mg by mouth daily, Disp: , Rfl:   •  magnesium hydroxide (MILK OF MAGNESIA) 400 mg/5 mL oral suspension, Take by mouth daily as needed for constipation, Disp: , Rfl:   •  metoprolol tartrate (LOPRESSOR) 50 mg tablet, Take 50 mg by mouth 2 (two) times a day, Disp: , Rfl:   •  mirtazapine (REMERON) 15 mg tablet, Take 7 5 mg by mouth daily at bedtime, Disp: , Rfl:   •  Multiple Vitamin (DAILY VALUE MULTIVITAMIN) TABS, Take 1 tablet by mouth daily , Disp: , Rfl:   •  Multiple Vitamins-Minerals (HAIR SKIN NAILS PO), Take 3 tablets by mouth daily, Disp: , Rfl:   •  Nutritional Supplements (Micha) PACK, Take by mouth, Disp: , Rfl:   •  Omega-3 Fatty Acids (FISH OIL) 1,000 mg, Take 1,000 mg by mouth daily , Disp: , Rfl:   •  oxyCODONE (ROXICODONE) 5 mg immediate release tablet, Take 1 tablet (5 mg total) by mouth every 6 (six) hours as needed for moderate painMax Daily Amount: 20 mg, Disp: 15 tablet, Rfl: 0  •  phenytoin (DILANTIN) 100 mg ER capsule, Take 100 mg by mouth every 12 (twelve) hours , Disp: , Rfl:   •  potassium chloride (K-DUR,KLOR-CON) 10 mEq tablet, , Disp: , Rfl:   •  psyllium (Metamucil) 0 52 g capsule, Take 0 52 g by mouth daily, Disp: , Rfl:   •  saccharomyces boulardii (Florastor) 250 mg capsule, Take 250 mg by mouth 2 (two) times a day, Disp: , Rfl:   •  senna (SENOKOT) 8 6 MG tablet, Take 8 6 mg by mouth 2 (two) times a day, Disp: , Rfl:   •  sertraline (ZOLOFT) 25 mg tablet, Take 100 mg by mouth daily, Disp: , Rfl:   •  sodium phosphate-biphosphate (FLEET) 7-19 g 118 mL enema, Insert 1 enema into the rectum, Disp: , Rfl:   •  vitamin B-12 (CYANOCOBALAMIN) 250 MCG tablet, Take 500 mcg by mouth daily, Disp: , Rfl:   •  warfarin (COUMADIN) 6 mg tablet, Take 6 mg by mouth daily, Disp: , Rfl:   Family History   Problem Relation Age of Onset   • Diabetes Father    • Kidney cancer Father    • Alzheimer's disease Mother    • Bone cancer Paternal Uncle    • Lung cancer Paternal Uncle               Coordination of Care: Wound team aware of the treatment plan  Facility nurse will provide wound treatment and monitor the wound for any changes  Patient / Staff education : Patient / Staff was given education on sign of infection and pressure ulcer prevention  Patient/ Staff verbalized understanding     Follow up :  Next week    Voice-recognition software may have been used in the preparation of this document  Occasional wrong word or "sound-alike" substitutions may have occurred due to the inherent limitations of voice recognition software  Interpretation should be guided by context        SVETLANA Castro

## 2023-01-18 NOTE — ASSESSMENT & PLAN NOTE
S/p ex lap, sigmoidectomy on 7/2/2022  -Wound was healed on October, 2022    As per report, the wound reopened   -100% scab, seems superficial  -Continue to monitor

## 2023-01-18 NOTE — ASSESSMENT & PLAN NOTE
Buttocks  · Full-thickness, with purple nonblanchable area, no obvious sign of infection  · I ordered Triad paste and bordered foam   However as per report, patient has been refusing daily bordered foam   Without bordered foam, this wound can worsen due to possible constant shearing  Patient spent most of her time in bed, flat in bed  Patient is on air mattress bed    · Increase protein intake, patient have a good appetite  · Follow-up next week

## 2023-01-24 ENCOUNTER — NURSING HOME VISIT (OUTPATIENT)
Dept: WOUND CARE | Facility: HOSPITAL | Age: 73
End: 2023-01-24

## 2023-01-24 DIAGNOSIS — L97.909 ARTERIAL LEG ULCER (HCC): Primary | ICD-10-CM

## 2023-01-24 DIAGNOSIS — Z98.890 S/P EXPLORATORY LAPAROTOMY: ICD-10-CM

## 2023-01-24 DIAGNOSIS — L89.303 PRESSURE INJURY OF BUTTOCK, STAGE 3, UNSPECIFIED LATERALITY (HCC): ICD-10-CM

## 2023-01-24 DIAGNOSIS — G82.20 PARAPLEGIA FOLLOWING SPINAL CORD INJURY (HCC): ICD-10-CM

## 2023-01-24 NOTE — PROGRESS NOTES
[address] [facility]NAG[facility]   [facility]       Patient ID: Kody Pradhan is a 71 y o  female Date of Birth 1950     Location of Service: [place] [address]    Performed wound round with: Wound team     Chief Complaint : Buttocks, abdomen, and left great toe    Wound Instructions:  Wound: Buttocks  Discontinue previous wound order  Cleanse the wound bed with NSS   Apply non-sting skin prep to periwound area  Apply Medihoney to wound bed and cover with ABD pad  Frequency : Daily and prn for soiling    Left great toe -apply Skin-Prep daily, leave open to air  Offload all wounds  Turn and reposition frequently, maximum of every two hours  Instruct / Assist with weight shifting every 15 - 20 minutes when in chair  Increase protein intake  Monitor for any sign of infection or worsening, inform PCP or patient's primary physician in your facility  Allergies  Latex and Other      Assessment & Plan:  1  Arterial leg ulcer ([facility] [address] )  Assessment & Plan: The wound on the left great toe is covered with stable eschar, with no obvious sign of infection  Continue local wound care with Skin-Prep      2  Pressure injury of buttock, stage 3, unspecified laterality ([facility] [address] )  Assessment & Plan:  Buttocks  · Wound worsened, increasing wound size 4 x 3 cm , with increase nonviable tissue  As per report, patient has been refusing the bordered foam to cover the wound  · Selective debridement done  · Change the wound treatment to Medihoney and ABD pad  · Continue to offload  · Clinical factors affecting wound healing including limited range of motion, poor sensation, incontinent of bowel, and noncompliance with wound treatment  · Follow-up next week      3  Paraplegia following spinal cord injury [facility])  Assessment & Plan:  On 24/7 restorative care, on pressure ulcer prevention protocol in the facility      4  S/P exploratory laparotomy  Assessment & Plan:   The wound on the abdomen is healed Subjective:   January 17, 2023  This is a new referral for wound on the buttocks, toes, and abdomen  Patient was referred by the geriatric team   As per report, patient was recently sent in [facility] acute care on January 10, 2023 for nausea and vomiting and while in the acute care, she developed pressure injury on the buttocks  Patient also admitted with a wound on the abdomen and left great toe  Received patient in bed, seems comfortable  Denies pain  The wound on abdomen was an old surgical wound that dehisced  The wound on the left great toe is an arterial wound  Patient spends most of her time in bed, she has a good appetite  Patient have indwelling catheter  January 24, 2023  Follow-up for wound on the buttocks, right great toe, and abdomen  Received patient in bed, seems comfortable  As per report, patient has been refusing the bordered foam for the wound on the sacrum  Patient spends most of her time in bed  Review of Systems   Constitutional: Negative  HENT: Negative  Eyes: Negative  Respiratory: Negative  Cardiovascular: Negative for chest pain and leg swelling  Gastrointestinal: Negative  Endocrine: Negative  Genitourinary: Negative  Musculoskeletal: Positive for gait problem  Skin: Positive for wound  See HPI   Neurological: Negative for dizziness and headaches  Psychiatric/Behavioral: Positive for confusion  Negative for behavioral problems  Objective:    Physical Exam  Constitutional:       Appearance: Normal appearance  HENT:      Head: Normocephalic  Nose: Nose normal       Mouth/Throat:      Pharynx: Oropharynx is clear  Eyes:      Conjunctiva/sclera: Conjunctivae normal    Pulmonary:      Effort: Pulmonary effort is normal    Abdominal:      Tenderness: There is no abdominal tenderness  There is no guarding  Genitourinary:     Comments: With indwelling catheter  Musculoskeletal:         General: No tenderness        Cervical back: Normal range of motion  Right lower leg: No edema  Left lower leg: No edema  Comments: LROM  paraplegia   Skin:     Findings: Lesion present  Comments: Buttocks -wound size is 4 x 3 x 0 1 cm ,  50% granulation, 50% black necrotic tissue, with small amount of serous drainage, periwound is normal, with no obvious sign of infection    Left great toe wound size is 0 3x0 8 cm ,  100% eschar, no drainage, no obvious sign of infection    Abdomen: healed   Neurological:      Mental Status: She is alert  Gait: Gait abnormal    Psychiatric:         Mood and Affect: Mood normal          Behavior: Behavior normal               Procedures           Patient's care was coordinated with nursing facility staff  Recent vitals, labs and updated medications were reviewed on EMR or chart system of facility  Past Medical, surgical, social, medication and allergy history and patient's previous records were reviewed and updated as appropriate: Most up-to date information is available in the facility EMR where the patient is currently admitted      Patient Active Problem List   Diagnosis   • Neurogenic bladder   • Chronic osteomyelitis ([facility])   • Depression   • DVT (deep venous thrombosis) ([facility])   • Heart murmur   • HTN (hypertension)   • Hyperlipidemia   • Hyponatremia   • Pressure ulcer of contiguous region involving left buttock and hip, unstageable ([address] )   • Kidney lesion, native, right   • Neurogenic bowel   • Paralysis ([facility] [address] )   • 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[address] )   • Parkinson's disease ([address] )   • Postoperative anemia due to acute blood loss   • Seizure disorder ([facility])   • Protein-calorie malnutrition ([facility])   • Chronic right shoulder pain   • CHF (congestive heart failure) ([facility])   • Septic arthritis of hip ([facility])   • Atrial fibrillation with RVR ([facility])   • Chronic anticoagulation   • Pressure injury of 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 ([facility])   • Loose stools   • Anemia due to chronic illness   • Indigestion   • Deep tissue injury   • Open wound   • Venous ulcer ([facility] )   • Pressure injury of left foot, unstageable ([facility])   • S/P exploratory laparotomy   • Arterial leg ulcer ([facility] )   • Pressure injury of buttock, stage 3 ([facility])     Past Medical History:   Diagnosis Date   • Back pain    • Congestive heart failure ([facility])    • Depressive disorder    • Hypertension    • Neurogenic bladder    • Open wound of leg    • Osteoarthritis    • Osteomyelitis ([facility])    • Paraplegia ([facility])    • Seizure ([facility])    • Thrombosis    • Ulcer of ankle ([facility] )    • Urinary retention      Past Surgical History:   Procedure Laterality Date   • CYSTOSCOPY     • SKIN GRAFT       Social History     Socioeconomic History   • Marital status: /Civil Union     Spouse name: None   • Number of children: None   • Years of education: None   • Highest education level: None   Occupational History   • None   Tobacco Use   • Smoking status: Never   • Smokeless tobacco: Never   Substance and Sexual Activity   • Alcohol use: No   • Drug use: No   • Sexual activity: None   Other Topics Concern   • None   Social History Narrative   • None     Social Determinants of Health     Financial Resource Strain: Not on file   Food Insecurity: Not on file   Transportation Needs: Not on file   Physical Activity: Not on file   Stress: Not on file   Social Connections: Not on file   Intimate Partner Violence: Not on file   Housing Stability: Not on file        Current Outpatient Medications:   •  acetaminophen (TYLENOL) 325 mg tablet, Take 650 mg by mouth every 4 (four) hours as needed, Disp: , Rfl:   •  amLODIPine-benazepril (LOTREL) 10-20 MG per capsule, Take by mouth , Disp: , Rfl:   •  atorvastatin (LIPITOR) 20 mg tablet, Take 20 mg by mouth daily , Disp: , Rfl:   •  Biotin 10 MG CAPS, Take 10 mg by mouth daily, Disp: , Rfl:   •  bisacodyl (Dulcolax) 10 mg suppository, Insert 10 mg into the rectum daily, Disp: , Rfl:   •  carbidopa-levodopa (SINEMET)  mg per tablet, Take 1 tablet by mouth 4 (four) times a day, Disp: , Rfl:   •  Cranberry 450 MG TABS, Take 450 mg by mouth, Disp: , Rfl:   •  famotidine (PEPCID) 20 mg tablet, Take 20 mg by mouth 2 (two) times a day, Disp: , Rfl:   •  furosemide (LASIX) 20 mg tablet, Take 20 mg by mouth daily, Disp: , Rfl:   •  magnesium hydroxide (MILK OF MAGNESIA) 400 mg/5 mL oral suspension, Take by mouth daily as needed for constipation, Disp: , Rfl:   •  metoprolol tartrate (LOPRESSOR) 50 mg tablet, Take 50 mg by mouth 2 (two) times a day, Disp: , Rfl:   •  mirtazapine (REMERON) 15 mg tablet, Take 7 5 mg by mouth daily at bedtime, Disp: , Rfl:   •  Multiple Vitamin (DAILY VALUE MULTIVITAMIN) TABS, Take 1 tablet by mouth daily , Disp: , Rfl:   •  Multiple Vitamins-Minerals (HAIR SKIN NAILS PO), Take 3 tablets by mouth daily, Disp: , Rfl:   •  Nutritional Supplements (Micha) PACK, Take by mouth, Disp: , Rfl:   •  Omega-3 Fatty Acids (FISH OIL) 1,000 mg, Take 1,000 mg by mouth daily , Disp: , Rfl:   •  oxyCODONE (ROXICODONE) 5 mg immediate release tablet, Take 1 tablet (5 mg total) by mouth every 6 (six) hours as needed for moderate painMax Daily Amount: 20 mg, Disp: 15 tablet, Rfl: 0  •  phenytoin (DILANTIN) 100 mg ER capsule, Take 100 mg by mouth every 12 (twelve) hours , Disp: , Rfl:   •  potassium chloride (K-DUR,KLOR-CON) 10 mEq tablet, , Disp: , Rfl:   •  psyllium (Metamucil) 0 52 g capsule, Take 0 52 g by mouth daily, Disp: , Rfl:   •  saccharomyces boulardii (Florastor) 250 mg capsule, Take 250 mg by mouth 2 (two) times a day, Disp: , Rfl:   •  senna (SENOKOT) 8 6 MG tablet, Take 8 6 mg by mouth 2 (two) times a day, Disp: , Rfl:   •  sertraline (ZOLOFT) 25 mg tablet, Take 100 mg by mouth daily, Disp: , Rfl:   •  sodium phosphate-biphosphate (FLEET) 7-19 g 118 mL enema, Insert 1 enema into the rectum, Disp: , Rfl:   •  vitamin B-12 (CYANOCOBALAMIN) 250 MCG tablet, Take 500 mcg by mouth daily, Disp: , Rfl:   •  warfarin (COUMADIN) 6 mg tablet, Take 6 mg by mouth daily, Disp: , Rfl:   Family History   Problem Relation Age of Onset   • Diabetes Father    • Kidney cancer Father    • Alzheimer's disease Mother    • Bone cancer Paternal Uncle    • Lung cancer Paternal Uncle               Coordination of Care: Wound team aware of the treatment plan  Facility nurse will provide wound treatment and monitor the wound for any changes  Patient / Staff education : Patient / Staff was given education on sign of infection and pressure ulcer prevention  Patient/ Staff verbalized understanding     Follow up :  Next week    Voice-recognition software may have been used in the preparation of this document  Occasional wrong word or "sound-alike" substitutions may have occurred due to the inherent limitations of voice recognition software  Interpretation should be guided by context        SVETLANA Fragoso

## 2023-01-24 NOTE — ASSESSMENT & PLAN NOTE
The wound on the left great toe is covered with stable eschar, with no obvious sign of infection  Continue local wound care with Skin-Prep

## 2023-01-24 NOTE — ASSESSMENT & PLAN NOTE
Buttocks  · Wound worsened, increasing wound size 4 x 3 cm , with increase nonviable tissue  As per report, patient has been refusing the bordered foam to cover the wound    · Selective debridement done  · Change the wound treatment to Medihoney and ABD pad  · Continue to offload  · Clinical factors affecting wound healing including limited range of motion, poor sensation, incontinent of bowel, and noncompliance with wound treatment  · Follow-up next week

## 2023-01-26 NOTE — ASSESSMENT & PLAN NOTE
Elevated BUN:Cr ratio  - encouraged po fluids  - ordered repeat CMP   If remains elevated will start IV fluids Patient requesting 30 day script for her metoprolol to [place] [facility].  She is waiting for mail order, but will run out before it will be shipped.  30 day metoprolol script sent to [place] [facility].

## 2023-01-31 ENCOUNTER — NURSING HOME VISIT (OUTPATIENT)
Dept: WOUND CARE | Facility: HOSPITAL | Age: 73
End: 2023-01-31

## 2023-01-31 DIAGNOSIS — L89.303 PRESSURE INJURY OF BUTTOCK, STAGE 3, UNSPECIFIED LATERALITY (HCC): ICD-10-CM

## 2023-01-31 DIAGNOSIS — L97.909 ARTERIAL LEG ULCER (HCC): Primary | ICD-10-CM

## 2023-01-31 NOTE — ASSESSMENT & PLAN NOTE
Buttocks  · Wound diameter almost the same as last week, with decreased slough  · Selective debridement done  · Change the wound treatment to Medihoney and ABD pad, staff aware that they only need to use Medipore tape  · Continue to offload  · Clinical factors affecting wound healing including limited range of motion, poor sensation, incontinent of bowel, and noncompliance with wound treatment  · Follow-up next week

## 2023-01-31 NOTE — PROGRESS NOTES
[address] MANAGEMENT   AND HYPERBARIC MEDICINE CENTER       Patient ID: Corrie Pedro is a 71 y o  female Date of Birth 1950     Location of Service: [place] [address]    Performed wound round with: Wound team     Chief Complaint : Buttocks, left great toe    Wound Instructions:  Wound: Buttocks  Discontinue previous wound order  Cleanse the wound bed with NSS   Apply non-sting skin prep to periwound area  Apply Medihoney to wound bed and cover with ABD pad and secure with medipore tape  Frequency : Daily and prn for soiling    Left great toe -apply Skin-Prep daily, leave open to air  Offload all wounds  Turn and reposition frequently  Increase protein intake  Monitor for any sign of infection or worsening, inform PCP or patient's primary physician in your facility  Allergies  Latex and Other      Assessment & Plan:  1  Arterial leg ulcer ([facility] [address] )  Assessment & Plan: The wound on the right great toe is healed      2  Pressure injury of buttock, stage 3, unspecified laterality ([facility] [address] )  Assessment & Plan:  Buttocks  · Wound diameter almost the same as last week, with decreased slough  · Selective debridement done  · Change the wound treatment to Medihoney and ABD pad, staff aware that they only need to use Medipore tape  · Continue to offload  · Clinical factors affecting wound healing including limited range of motion, poor sensation, incontinent of bowel, and noncompliance with wound treatment  · Follow-up next week    Orders:  -     Debridement           Subjective:   January 17, 2023  This is a new referral for wound on the buttocks, toes, and abdomen  Patient was referred by the geriatric team   As per report, patient was recently sent in [facility] acute care on January 10, 2023 for nausea and vomiting and while in the acute care, she developed pressure injury on the buttocks  Patient also admitted with a wound on the abdomen and left great toe  Received patient in bed, seems comfortable  Denies pain  The wound on abdomen was an old surgical wound that dehisced  The wound on the left great toe is an arterial wound  Patient spends most of her time in bed, she has a good appetite  Patient have indwelling catheter  January 24, 2023  Follow-up for wound on the buttocks, right great toe, and abdomen  Received patient in bed, seems comfortable  As per report, patient has been refusing the bordered foam for the wound on the sacrum  Patient spends most of her time in bed     1/31/2023 Follow up for wound on the follow-up for wound on buttocks and toes  Received patient, not in distress  Facility staff did not report any significant issues related to the wound  Denies pain  Review of Systems   Constitutional: Negative  HENT: Negative  Eyes: Negative  Respiratory: Negative  Cardiovascular: Negative for chest pain and leg swelling  Gastrointestinal: Negative  Endocrine: Negative  Genitourinary: Negative  Musculoskeletal: Positive for gait problem  Skin: Positive for wound  See HPI   Neurological: Negative for dizziness and headaches  Psychiatric/Behavioral: Positive for confusion  Negative for behavioral problems  Objective:    Physical Exam  Constitutional:       Appearance: Normal appearance  HENT:      Head: Normocephalic  Nose: Nose normal       Mouth/Throat:      Pharynx: Oropharynx is clear  Eyes:      Conjunctiva/sclera: Conjunctivae normal    Pulmonary:      Effort: Pulmonary effort is normal    Abdominal:      Tenderness: There is no abdominal tenderness  There is no guarding  Genitourinary:     Comments: With indwelling catheter  Musculoskeletal:         General: No tenderness  Cervical back: Normal range of motion  Right lower leg: No edema  Left lower leg: No edema  Comments: LROM  paraplegia   Skin:     Findings: Lesion present        Comments: Buttocks -wound size is 3 x 4 x 0 1 cm ,  50% granulation, 50% slough,  with small amount of serous drainage, periwound is normal, with no obvious sign of infection    Left great toe wound  - healed, with skin discoloration, no obvious sign of infection   Neurological:      Mental Status: She is alert  Gait: Gait abnormal    Psychiatric:         Mood and Affect: Mood normal          Behavior: Behavior normal               Debridement   Universal Protocol:  Consent: Verbal consent obtained  Risks and benefits: risks, benefits and alternatives were discussed  Consent given by: patient  Time out: Immediately prior to procedure a "time out" was called to verify the correct patient, procedure, equipment, support staff and site/side marked as required  Timeout called at: 1/31/2023 7:30 AM   Patient understanding: patient states understanding of the procedure being performed  Patient identity confirmed: verbally with patient      Performed by: NP  Debridement type: selective  Pain control: none  Post-debridement measurements  Length (cm): 3  Width (cm): 4  Depth (cm): 0 1  Percent debrided: 100%  Surface Area (cm^2): 12  Area debrided (cm^2): 12  Volume (cm^3): 1 2  Devitalized tissue debrided: biofilm, fibrin and slough  Instrument(s) utilized: curette  Bleeding: small  Hemostasis obtained with: pressure  Procedural pain (0-10): 0  Post-procedural pain: 0   Response to treatment: procedure was tolerated well                 Patient's care was coordinated with nursing facility staff  Recent vitals, labs and updated medications were reviewed on EMR or chart system of facility  Past Medical, surgical, social, medication and allergy history and patient's previous records were reviewed and updated as appropriate: Most up-to date information is available in the facility EMR where the patient is currently admitted      Patient Active Problem List   Diagnosis   • Neurogenic bladder   • Chronic osteomyelitis (HCC)   • Depression   • DVT (deep venous thrombosis) ([facility]   • Heart murmur • HTN (hypertension)   • Hyperlipidemia   • Hyponatremia   • Pressure ulcer of contiguous region involving left buttock and hip, unstageable (HCC)   • Kidney lesion, native, right   • Neurogenic bowel   • Paralysis ([facility] [address] )   • 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[facility] [address] )   • Parkinson's disease ([facility] [address] )   • Postoperative anemia due to acute blood loss   • Seizure disorder (HCC)   • Protein-calorie malnutrition (HCC)   • Chronic right shoulder pain   • CHF (congestive heart failure) (HCC)   • Septic arthritis of hip (HCC)   • Atrial fibrillation with RVR (HCC)   • Chronic anticoagulation   • Pressure injury of 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 (HCC)   • Loose stools   • Anemia due to chronic illness   • Indigestion   • Deep tissue injury   • Open wound   • Venous ulcer (HCC)   • Pressure injury of left foot, unstageable (HCC)   • S/P exploratory laparotomy   • Arterial leg ulcer ([facility] [address] )   • Pressure injury of buttock, stage 3 ([facility] [address] )     Past Medical History:   Diagnosis Date   • Back pain    • Congestive heart failure (HCC)    • Depressive disorder    • Hypertension    • Neurogenic bladder    • Open wound of leg    • Osteoarthritis    • Osteomyelitis (HCC)    • Paraplegia (HCC)    • Seizure ([facility] [address] )    • Thrombosis    • Ulcer of ankle ([facility] [address] )    • Urinary retention      Past Surgical History:   Procedure Laterality Date   • CYSTOSCOPY     • SKIN GRAFT       Social History     Socioeconomic History   • Marital status: /Civil Union     Spouse name: None   • Number of children: None   • Years of education: None   • Highest education level: None   Occupational History   • None   Tobacco Use   • Smoking status: Never   • Smokeless tobacco: Never   Substance and Sexual Activity   • Alcohol use: No   • Drug use: No   • Sexual activity: None   Other Topics Concern   • None   Social History Narrative   • None     Social Determinants of Health     Financial Resource Strain: Not on file   Food Insecurity: Not on file   Transportation Needs: Not on file   Physical Activity: Not on file   Stress: Not on file   Social Connections: Not on file   Intimate Partner Violence: Not on file   Housing Stability: Not on file        Current Outpatient Medications:   •  acetaminophen (TYLENOL) 325 mg tablet, Take 650 mg by mouth every 4 (four) hours as needed, Disp: , Rfl:   •  amLODIPine-benazepril (LOTREL) 10-20 MG per capsule, Take by mouth , Disp: , Rfl:   •  atorvastatin (LIPITOR) 20 mg tablet, Take 20 mg by mouth daily , Disp: , Rfl:   •  Biotin 10 MG CAPS, Take 10 mg by mouth daily, Disp: , Rfl:   •  bisacodyl (Dulcolax) 10 mg suppository, Insert 10 mg into the rectum daily, Disp: , Rfl:   •  carbidopa-levodopa (SINEMET)  mg per tablet, Take 1 tablet by mouth 4 (four) times a day, Disp: , Rfl:   •  Cranberry 450 MG TABS, Take 450 mg by mouth, Disp: , Rfl:   •  famotidine (PEPCID) 20 mg tablet, Take 20 mg by mouth 2 (two) times a day, Disp: , Rfl:   •  furosemide (LASIX) 20 mg tablet, Take 20 mg by mouth daily, Disp: , Rfl:   •  magnesium hydroxide (MILK OF MAGNESIA) 400 mg/5 mL oral suspension, Take by mouth daily as needed for constipation, Disp: , Rfl:   •  metoprolol tartrate (LOPRESSOR) 50 mg tablet, Take 50 mg by mouth 2 (two) times a day, Disp: , Rfl:   •  mirtazapine (REMERON) 15 mg tablet, Take 7 5 mg by mouth daily at bedtime, Disp: , Rfl:   •  Multiple Vitamin (DAILY VALUE MULTIVITAMIN) TABS, Take 1 tablet by mouth daily , Disp: , Rfl:   •  Multiple Vitamins-Minerals (HAIR SKIN NAILS PO), Take 3 tablets by mouth daily, Disp: , Rfl:   •  Nutritional Supplements (Micha) PACK, Take by mouth, Disp: , Rfl:   •  Omega-3 Fatty Acids (FISH OIL) 1,000 mg, Take 1,000 mg by mouth daily , Disp: , Rfl:   •  oxyCODONE (ROXICODONE) 5 mg immediate release tablet, Take 1 tablet (5 mg total) by mouth every 6 (six) hours as needed for moderate painMax Daily Amount: 20 mg, Disp: 15 tablet, Rfl: 0  •  phenytoin (DILANTIN) 100 mg ER capsule, Take 100 mg by mouth every 12 (twelve) hours , Disp: , Rfl:   •  potassium chloride (K-DUR,KLOR-CON) 10 mEq tablet, , Disp: , Rfl:   •  psyllium (Metamucil) 0 52 g capsule, Take 0 52 g by mouth daily, Disp: , Rfl:   •  saccharomyces boulardii (Florastor) 250 mg capsule, Take 250 mg by mouth 2 (two) times a day, Disp: , Rfl:   •  senna (SENOKOT) 8 6 MG tablet, Take 8 6 mg by mouth 2 (two) times a day, Disp: , Rfl:   •  sertraline (ZOLOFT) 25 mg tablet, Take 100 mg by mouth daily, Disp: , Rfl:   •  sodium phosphate-biphosphate (FLEET) 7-19 g 118 mL enema, Insert 1 enema into the rectum, Disp: , Rfl:   •  vitamin B-12 (CYANOCOBALAMIN) 250 MCG tablet, Take 500 mcg by mouth daily, Disp: , Rfl:   •  warfarin (COUMADIN) 6 mg tablet, Take 6 mg by mouth daily, Disp: , Rfl:   Family History   Problem Relation Age of Onset   • Diabetes Father    • Kidney cancer Father    • Alzheimer's disease Mother    • Bone cancer Paternal Uncle    • Lung cancer Paternal Uncle               Coordination of Care: Wound team aware of the treatment plan  Facility nurse will provide wound treatment and monitor the wound for any changes  Patient / Staff education : Patient / Staff was given education on sign of infection and pressure ulcer prevention  Patient/ Staff verbalized understanding     Follow up :  Next week    Voice-recognition software may have been used in the preparation of this document  Occasional wrong word or "sound-alike" substitutions may have occurred due to the inherent limitations of voice recognition software  Interpretation should be guided by context        SVETLANA Roberts

## 2023-02-07 ENCOUNTER — NURSING HOME VISIT (OUTPATIENT)
Dept: WOUND CARE | Facility: HOSPITAL | Age: 73
End: 2023-02-07

## 2023-02-07 DIAGNOSIS — G82.20 PARAPLEGIA FOLLOWING SPINAL CORD INJURY (HCC): ICD-10-CM

## 2023-02-07 DIAGNOSIS — L89.303 PRESSURE INJURY OF BUTTOCK, STAGE 3, UNSPECIFIED LATERALITY (HCC): Primary | ICD-10-CM

## 2023-02-07 NOTE — PROGRESS NOTES
[address] MANAGEMENT   AND HYPERBARIC MEDICINE CENTER       Patient ID: Tia Corbett is a 71 y o  female Date of Birth 1950     Location of Service: [place] [address]    Performed wound round with: Wound team     Chief Complaint : Buttocks    Wound Instructions:  Wound: Buttocks  Discontinue previous wound order  Cleanse the wound bed with NSS   Apply non-sting skin prep to periwound area  Apply Triad paste to wound bed and cover with ABD pad and secure with medipore tape  Frequency : Daily and prn for soiling    Left great toe -apply Skin-Prep daily, leave open to air  Offload all wounds  Turn and reposition frequently  Increase protein intake  Monitor for any sign of infection or worsening, inform PCP or patient's primary physician in your facility  Allergies  Latex and Other      Assessment & Plan:  1  Pressure injury of buttock, stage 3, unspecified laterality ([facility] [address] )  Assessment & Plan: The wound on the buttock improved, partial-thickness, with no obvious sign of infection  Change local wound care to Triad paste  Continue to offload  Follow-up next week      2  Paraplegia following spinal cord injury [facility])  Assessment & Plan:  On 24/7 restorative care, on pressure ulcer prevention protocol in the facility             Subjective:   January 17, 2023  This is a new referral for wound on the buttocks, toes, and abdomen  Patient was referred by the geriatric team   As per report, patient was recently sent in [facility] acute care on January 10, 2023 for nausea and vomiting and while in the acute care, she developed pressure injury on the buttocks  Patient also admitted with a wound on the abdomen and left great toe  Received patient in bed, seems comfortable  Denies pain  The wound on abdomen was an old surgical wound that dehisced  The wound on the left great toe is an arterial wound  Patient spends most of her time in bed, she has a good appetite    Patient have indwelling catheter  January 24, 2023  Follow-up for wound on the buttocks, right great toe, and abdomen  Received patient in bed, seems comfortable  As per report, patient has been refusing the bordered foam for the wound on the sacrum  Patient spends most of her time in bed     1/31/2023 Follow up for wound on the follow-up for wound on buttocks and toes  Received patient, not in distress  Facility staff did not report any significant issues related to the wound  Denies pain  February 7, 2023  Follow-up for wound on the buttocks  Received patient in bed, seems comfortable  Denies pain  No significant issues related to the wound  Review of Systems   Constitutional: Negative  HENT: Negative  Eyes: Negative  Respiratory: Negative  Cardiovascular: Negative for chest pain and leg swelling  Gastrointestinal: Negative  Endocrine: Negative  Genitourinary: Negative  Musculoskeletal: Positive for gait problem  Skin: Positive for wound  See HPI   Neurological: Negative for dizziness and headaches  Psychiatric/Behavioral: Positive for confusion  Negative for behavioral problems  Objective:    Physical Exam  Constitutional:       Appearance: Normal appearance  HENT:      Head: Normocephalic  Nose: Nose normal       Mouth/Throat:      Pharynx: Oropharynx is clear  Eyes:      Conjunctiva/sclera: Conjunctivae normal    Pulmonary:      Effort: Pulmonary effort is normal    Abdominal:      Tenderness: There is no abdominal tenderness  There is no guarding  Genitourinary:     Comments: With indwelling catheter  Musculoskeletal:         General: No tenderness  Cervical back: Normal range of motion  Right lower leg: No edema  Left lower leg: No edema  Comments: LROM  paraplegia   Skin:     Findings: Lesion present        Comments: Buttocks -wound size is 3 5 x 3 5 x 0 1 cm ,  100% epithelial, small amount of serous drainage, periwound is macerated, with no obvious sign of infection   Neurological:      Mental Status: She is alert  Gait: Gait abnormal    Psychiatric:         Mood and Affect: Mood normal          Behavior: Behavior normal               Procedures           Patient's care was coordinated with nursing facility staff  Recent vitals, labs and updated medications were reviewed on EMR or chart system of facility  Past Medical, surgical, social, medication and allergy history and patient's previous records were reviewed and updated as appropriate: Most up-to date information is available in the facility EMR where the patient is currently admitted      Patient Active Problem List   Diagnosis   • Neurogenic bladder   • Chronic osteomyelitis ([facility])   • Depression   • DVT (deep venous thrombosis) ([facility])   • Heart murmur   • HTN (hypertension)   • Hyperlipidemia   • Hyponatremia   • Pressure ulcer of contiguous region involving left buttock and hip, unstageable ([facility] [address] )   • Kidney lesion, native, right   • Neurogenic bowel   • Paralysis ([facility] [address] )   • 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[facility] [address] )   • Parkinson's disease ([facility] [address] )   • Postoperative anemia due to acute blood loss   • Seizure disorder ([facility])   • Protein-calorie malnutrition ([facility])   • Chronic right shoulder pain   • CHF (congestive heart failure) ([facility])   • Septic arthritis of hip ([facility])   • Atrial fibrillation with RVR ([facility])   • Chronic anticoagulation   • Pressure injury of 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 ([facility])   • Loose stools   • Anemia due to chronic illness   • Indigestion   • Deep tissue injury   • Open wound   • Venous ulcer ([facility])   • Pressure injury of left foot, unstageable ([facility] [address] )   • S/P exploratory laparotomy   • Arterial leg ulcer ([facility] [address] )   • Pressure injury of buttock, stage 3 ([facility] [address] )     Past Medical History:   Diagnosis Date   • Back pain    • Congestive heart failure ([facility])    • Depressive disorder    • Hypertension    • Neurogenic bladder    • Open wound of leg    • Osteoarthritis    • Osteomyelitis ([facility] [address] )    • Paraplegia ([facility] [address] )    • Seizure ([facility] [address] )    • Thrombosis    • [facility]    • Urinary retention      Past Surgical History:   Procedure Laterality Date   • CYSTOSCOPY     • SKIN GRAFT       Social History     Socioeconomic History   • Marital status: /Civil Union     Spouse name: None   • Number of children: None   • Years of education: None   • Highest education level: None   Occupational History   • None   Tobacco Use   • Smoking status: Never   • Smokeless tobacco: Never   Substance and Sexual Activity   • Alcohol use: No   • Drug use: No   • Sexual activity: None   Other Topics Concern   • None   Social History Narrative   • None     Social Determinants of Health     Financial Resource Strain: Not on file   Food Insecurity: Not on file   Transportation Needs: Not on file   Physical Activity: Not on file   Stress: Not on file   Social Connections: Not on file   Intimate Partner Violence: Not on file   Housing Stability: Not on file        Current Outpatient Medications:   •  acetaminophen (TYLENOL) 325 mg tablet, Take 650 mg by mouth every 4 (four) hours as needed, Disp: , Rfl:   •  amLODIPine-benazepril (LOTREL) 10-20 MG per capsule, Take by mouth , Disp: , Rfl:   •  atorvastatin (LIPITOR) 20 mg tablet, Take 20 mg by mouth daily , Disp: , Rfl:   •  Biotin 10 MG CAPS, Take 10 mg by mouth daily, Disp: , Rfl:   •  bisacodyl (Dulcolax) 10 mg suppository, Insert 10 mg into the rectum daily, Disp: , Rfl:   •  carbidopa-levodopa (SINEMET)  mg per tablet, Take 1 tablet by mouth 4 (four) times a day, Disp: , Rfl:   •  Cranberry 450 MG TABS, Take 450 mg by mouth, Disp: , Rfl:   •  famotidine (PEPCID) 20 mg tablet, Take 20 mg by mouth 2 (two) times a day, Disp: , Rfl:   •  furosemide (LASIX) 20 mg tablet, Take 20 mg by mouth daily, Disp: , Rfl:   •  magnesium hydroxide (MILK OF MAGNESIA) 400 mg/5 mL oral suspension, Take by mouth daily as needed for constipation, Disp: , Rfl:   •  metoprolol tartrate (LOPRESSOR) 50 mg tablet, Take 50 mg by mouth 2 (two) times a day, Disp: , Rfl:   •  mirtazapine (REMERON) 15 mg tablet, Take 7 5 mg by mouth daily at bedtime, Disp: , Rfl:   •  Multiple Vitamin (DAILY VALUE MULTIVITAMIN) TABS, Take 1 tablet by mouth daily , Disp: , Rfl:   •  Multiple Vitamins-Minerals (HAIR SKIN NAILS PO), Take 3 tablets by mouth daily, Disp: , Rfl:   •  Nutritional Supplements (Micha) PACK, Take by mouth, Disp: , Rfl:   •  Omega-3 Fatty Acids (FISH OIL) 1,000 mg, Take 1,000 mg by mouth daily , Disp: , Rfl:   •  oxyCODONE (ROXICODONE) 5 mg immediate release tablet, Take 1 tablet (5 mg total) by mouth every 6 (six) hours as needed for moderate painMax Daily Amount: 20 mg, Disp: 15 tablet, Rfl: 0  •  phenytoin (DILANTIN) 100 mg ER capsule, Take 100 mg by mouth every 12 (twelve) hours , Disp: , Rfl:   •  potassium chloride (K-DUR,KLOR-CON) 10 mEq tablet, , Disp: , Rfl:   •  psyllium (Metamucil) 0 52 g capsule, Take 0 52 g by mouth daily, Disp: , Rfl:   •  saccharomyces boulardii (Florastor) 250 mg capsule, Take 250 mg by mouth 2 (two) times a day, Disp: , Rfl:   •  senna (SENOKOT) 8 6 MG tablet, Take 8 6 mg by mouth 2 (two) times a day, Disp: , Rfl:   •  sertraline (ZOLOFT) 25 mg tablet, Take 100 mg by mouth daily, Disp: , Rfl:   •  sodium phosphate-biphosphate (FLEET) 7-19 g 118 mL enema, Insert 1 enema into the rectum, Disp: , Rfl:   •  vitamin B-12 (CYANOCOBALAMIN) 250 MCG tablet, Take 500 mcg by mouth daily, Disp: , Rfl:   •  warfarin (COUMADIN) 6 mg tablet, Take 6 mg by mouth daily, Disp: , Rfl:   Family History   Problem Relation Age of Onset   • Diabetes Father    • Kidney cancer Father    • Alzheimer's disease Mother    • Bone cancer Paternal Uncle    • Lung cancer Paternal Uncle               Coordination of Care: Wound team aware of the treatment plan  Facility nurse will provide wound treatment and monitor the wound for any changes       Patient / Staff education : Patient / Staff was given education on sign of infection and pressure ulcer prevention  Patient/ Staff verbalized understanding     Follow up :  Next week    Voice-recognition software may have been used in the preparation of this document  Occasional wrong word or "sound-alike" substitutions may have occurred due to the inherent limitations of voice recognition software  Interpretation should be guided by context        SVETLANA Brandt

## 2023-02-07 NOTE — ASSESSMENT & PLAN NOTE
The wound on the buttock improved, partial-thickness, with no obvious sign of infection  Change local wound care to Triad paste  Continue to offload  Follow-up next week

## 2023-02-14 ENCOUNTER — NURSING HOME VISIT (OUTPATIENT)
Dept: WOUND CARE | Facility: HOSPITAL | Age: 73
End: 2023-02-14

## 2023-02-14 DIAGNOSIS — L89.303 PRESSURE INJURY OF BUTTOCK, STAGE 3, UNSPECIFIED LATERALITY (HCC): Primary | ICD-10-CM

## 2023-02-15 NOTE — ASSESSMENT & PLAN NOTE
Decreasing wound size, partial-thickness, with no obvious sign of infection  Change local wound care to Triad paste  Continue to use Clinitron bed  On Micha supplement  Continue to offload  Follow-up next week

## 2023-02-15 NOTE — PROGRESS NOTES
[address] MANAGEMENT   AND [facility]PERBARIC MEDICINE CENTER       Patient ID: Shayy Arteaga is a 71 y o  female Date of Birth 1950     Location of Service: [facility]    Performed wound round with: Wound team     Chief Complaint : Buttocks    Wound Instructions:  Wound: Buttocks  Discontinue previous wound order  Cleanse the wound bed with NSS   Apply non-sting skin prep to periwound area  Apply Triad paste to wound bed and cover with ABD pad and secure with medipore tape  Frequency : Daily and prn for soiling    Left great toe -apply Skin-Prep daily, leave open to air  Offload all wounds  Turn and reposition frequently  Increase protein intake  Monitor for any sign of infection or worsening, inform PCP or patient's primary physician in your facility  Allergies  Latex and Other      Assessment & Plan:  1  Pressure injury of buttock, stage 3, unspecified laterality ([facility] [address] )  Assessment & Plan:  Decreasing wound size, partial-thickness, with no obvious sign of infection  Change local wound care to Triad paste  Continue to use Clinitron bed  On Micha supplement  Continue to offload  Follow-up next week             Subjective:   January 17, 2023  This is a new referral for wound on the buttocks, toes, and abdomen  Patient was referred by the geriatric team   As per report, patient was recently sent in [facility] acute care on January 10, 2023 for nausea and vomiting and while in the acute care, she developed pressure injury on the buttocks  Patient also admitted with a wound on the abdomen and left great toe  Received patient in bed, seems comfortable  Denies pain  The wound on abdomen was an old surgical wound that dehisced  The wound on the left great toe is an arterial wound  Patient spends most of her time in bed, she has a good appetite  Patient have indwelling catheter  January 24, 2023  Follow-up for wound on the buttocks, right great toe, and abdomen    Received patient in bed, seems comfortable  As per report, patient has been refusing the bordered foam for the wound on the sacrum  Patient spends most of her time in bed     1/31/2023 Follow up for wound on the follow-up for wound on buttocks and toes  Received patient, not in distress  Facility staff did not report any significant issues related to the wound  Denies pain  February 7, 2023  Follow-up for wound on the buttocks  Received patient in bed, seems comfortable  Denies pain  No significant issues related to the wound  2/14/2023  Follow-up for wound on the buttocks  Received patient in bed, seems comfortable  No significant issues related to the wound  Patient is on Clinitron  Review of Systems   Constitutional: Negative  HENT: Negative  Eyes: Negative  Respiratory: Negative  Cardiovascular: Negative for chest pain and leg swelling  Gastrointestinal: Negative  Endocrine: Negative  Genitourinary: Negative  Musculoskeletal: Positive for gait problem  Skin: Positive for wound  See HPI   Neurological: Negative for dizziness and headaches  Psychiatric/Behavioral: Positive for confusion  Negative for behavioral problems  Objective:    Physical Exam  Constitutional:       Appearance: Normal appearance  HENT:      Head: Normocephalic  Nose: Nose normal       Mouth/Throat:      Pharynx: Oropharynx is clear  Eyes:      Conjunctiva/sclera: Conjunctivae normal    Pulmonary:      Effort: Pulmonary effort is normal    Abdominal:      Tenderness: There is no abdominal tenderness  There is no guarding  Genitourinary:     Comments: With indwelling catheter  Musculoskeletal:         General: No tenderness  Cervical back: Normal range of motion  Right lower leg: No edema  Left lower leg: No edema  Comments: LROM  paraplegia   Skin:     Findings: Lesion present        Comments: Buttocks -wound size is 3 x 3 x 0 1 cm ,  100% epithelial, small amount of serous drainage, periwound is macerated, with no obvious sign of infection   Neurological:      Mental Status: She is alert  Gait: Gait abnormal    Psychiatric:         Mood and Affect: Mood normal          Behavior: Behavior normal               Procedures           Patient's care was coordinated with nursing facility staff  Recent vitals, labs and updated medications were reviewed on EMR or chart system of facility  Past Medical, surgical, social, medication and allergy history and patient's previous records were reviewed and updated as appropriate: Most up-to date information is available in the facility EMR where the patient is currently admitted      Patient Active Problem List   Diagnosis   • Neurogenic bladder   • Chronic osteomyelitis ([facility])   • Depression   • DVT (deep venous thrombosis) ([facility])   • Heart murmur   • HTN (hypertension)   • Hyperlipidemia   • Hyponatremia   • Pressure ulcer of contiguous region involving left buttock and hip, unstageable ([facility] [address] )   • Kidney lesion, native, right   • Neurogenic bowel   • Paralysis ([facility] [address] )   • 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[facility] [address] )   • Parkinson's disease ([facility] [address] )   • Postoperative anemia due to acute blood loss   • Seizure disorder ([facility])   • Protein-calorie malnutrition ([facility])   • Chronic right shoulder pain   • CHF (congestive heart failure) ([facility])   • Septic arthritis of hip ([facility])   • Atrial fibrillation with RVR ([facility])   • Chronic anticoagulation   • Pressure injury of 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 ([facility])   • Loose stools   • Anemia due to chronic illness   • Indigestion   • Deep tissue injury   • Open wound   • Venous ulcer ([facility])   • Pressure injury of left foot, unstageable ([facility] [address] )   • S/P exploratory laparotomy   • Arterial leg ulcer ([facility] [address] )   • Pressure injury of buttock, stage 3 ([address] )     Past Medical History:   Diagnosis Date   • Back pain    • Congestive heart failure ([facility])    • Depressive disorder    • Hypertension    • Neurogenic bladder    • Open wound of leg    • Osteoarthritis    • Osteomyelitis ([facility])    • Paraplegia ([facility])    • Seizure ([facility] [address] )    • Thrombosis    • Ulcer of ankle ([facility] [address] )    • Urinary retention      Past Surgical History:   Procedure Laterality Date   • CYSTOSCOPY     • SKIN GRAFT       Social History     Socioeconomic History   • Marital status: /Civil Union     Spouse name: None   • Number of children: None   • Years of education: None   • Highest education level: None   Occupational History   • None   Tobacco Use   • Smoking status: Never   • Smokeless tobacco: Never   Substance and Sexual Activity   • Alcohol use: No   • Drug use: No   • Sexual activity: None   Other Topics Concern   • None   Social History Narrative   • None     Social Determinants of Health     Financial Resource Strain: Not on file   Food Insecurity: Not on file   Transportation Needs: Not on file   Physical Activity: Not on file   Stress: Not on file   Social Connections: Not on file   Intimate Partner Violence: Not on file   Housing Stability: Not on file        Current Outpatient Medications:   •  acetaminophen (TYLENOL) 325 mg tablet, Take 650 mg by mouth every 4 (four) hours as needed, Disp: , Rfl:   •  amLODIPine-benazepril (LOTREL) 10-20 MG per capsule, Take by mouth , Disp: , Rfl:   •  atorvastatin (LIPITOR) 20 mg tablet, Take 20 mg by mouth daily , Disp: , Rfl:   •  Biotin 10 MG CAPS, Take 10 mg by mouth daily, Disp: , Rfl:   •  bisacodyl (Dulcolax) 10 mg suppository, Insert 10 mg into the rectum daily, Disp: , Rfl:   •  carbidopa-levodopa (SINEMET)  mg per tablet, Take 1 tablet by mouth 4 (four) times a day, Disp: , Rfl:   •  Cranberry 450 MG TABS, Take 450 mg by mouth, Disp: , Rfl:   •  famotidine (PEPCID) 20 mg tablet, Take 20 mg by mouth 2 (two) times a day, Disp: , Rfl:   •  furosemide (LASIX) 20 mg tablet, Take 20 mg by mouth daily, Disp: , Rfl:   •  magnesium hydroxide (MILK OF MAGNESIA) 400 mg/5 mL oral suspension, Take by mouth daily as needed for constipation, Disp: , Rfl:   •  metoprolol tartrate (LOPRESSOR) 50 mg tablet, Take 50 mg by mouth 2 (two) times a day, Disp: , Rfl:   •  mirtazapine (REMERON) 15 mg tablet, Take 7 5 mg by mouth daily at bedtime, Disp: , Rfl:   •  Multiple Vitamin (DAILY VALUE MULTIVITAMIN) TABS, Take 1 tablet by mouth daily , Disp: , Rfl:   •  Multiple Vitamins-Minerals (HAIR SKIN NAILS PO), Take 3 tablets by mouth daily, Disp: , Rfl:   •  Nutritional Supplements (Micha) PACK, Take by mouth, Disp: , Rfl:   •  Omega-3 Fatty Acids (FISH OIL) 1,000 mg, Take 1,000 mg by mouth daily , Disp: , Rfl:   •  oxyCODONE (ROXICODONE) 5 mg immediate release tablet, Take 1 tablet (5 mg total) by mouth every 6 (six) hours as needed for moderate painMax Daily Amount: 20 mg, Disp: 15 tablet, Rfl: 0  •  phenytoin (DILANTIN) 100 mg ER capsule, Take 100 mg by mouth every 12 (twelve) hours , Disp: , Rfl:   •  potassium chloride (K-DUR,KLOR-CON) 10 mEq tablet, , Disp: , Rfl:   •  psyllium (Metamucil) 0 52 g capsule, Take 0 52 g by mouth daily, Disp: , Rfl:   •  saccharomyces boulardii (Florastor) 250 mg capsule, Take 250 mg by mouth 2 (two) times a day, Disp: , Rfl:   •  senna (SENOKOT) 8 6 MG tablet, Take 8 6 mg by mouth 2 (two) times a day, Disp: , Rfl:   •  sertraline (ZOLOFT) 25 mg tablet, Take 100 mg by mouth daily, Disp: , Rfl:   •  sodium phosphate-biphosphate (FLEET) 7-19 g 118 mL enema, Insert 1 enema into the rectum, Disp: , Rfl:   •  vitamin B-12 (CYANOCOBALAMIN) 250 MCG tablet, Take 500 mcg by mouth daily, Disp: , Rfl:   •  warfarin (COUMADIN) 6 mg tablet, Take 6 mg by mouth daily, Disp: , Rfl:   Family History   Problem Relation Age of Onset   • Diabetes Father    • Kidney cancer Father    • Alzheimer's disease Mother    • Bone cancer Paternal Uncle    • Lung cancer Paternal Uncle               Coordination of Care: Wound team aware of the treatment plan  Facility nurse will provide wound treatment and monitor the wound for any changes  Patient / Staff education : Patient / Staff was given education on sign of infection and pressure ulcer prevention  Patient/ Staff verbalized understanding     Follow up :  Next week    Voice-recognition software may have been used in the preparation of this document  Occasional wrong word or "sound-alike" substitutions may have occurred due to the inherent limitations of voice recognition software  Interpretation should be guided by context        SVETLANA Bourgeois

## 2023-02-21 ENCOUNTER — NURSING HOME VISIT (OUTPATIENT)
Dept: WOUND CARE | Facility: HOSPITAL | Age: 73
End: 2023-02-21

## 2023-02-21 DIAGNOSIS — L89.303 PRESSURE INJURY OF BUTTOCK, STAGE 3, UNSPECIFIED LATERALITY (HCC): Primary | ICD-10-CM

## 2023-02-21 NOTE — PROGRESS NOTES
[address] MANAGEMENT   AND HYPERBARIC MEDICINE CENTER       Patient ID: Hiram Faust is a 71 y o  female Date of Birth 1950     Location of Service: [facility]    Performed wound round with: Wound team     Chief Complaint : Buttocks    Wound Instructions:  Wound: Buttocks  Discontinue previous wound order  Cleanse the wound bed with NSS   Apply non-sting skin prep to periwound area  Apply Triad paste to wound bed and cover with ABD pad and secure with medipore tape  Frequency : Daily and prn for soiling    Left great toe -apply Skin-Prep daily, leave open to air  Offload all wounds  Turn and reposition frequently  Increase protein intake  Monitor for any sign of infection or worsening, inform PCP or patient's primary physician in your facility  Allergies  Latex and Other      Assessment & Plan:  1  Pressure injury of buttock, stage 3, unspecified laterality ([facility] [address] )  Assessment & Plan: The wound on buttock decreased, 2 5x2 compared to last week measurement of 3x3   100% epithelial, with macerated periwound  Continue Triad paste and ABD pad  Continue to offload, on Clinitron  Follow-up next week             Subjective:   January 17, 2023  This is a new referral for wound on the buttocks, toes, and abdomen  Patient was referred by the geriatric team   As per report, patient was recently sent in [facility] acute care on January 10, 2023 for nausea and vomiting and while in the acute care, she developed pressure injury on the buttocks  Patient also admitted with a wound on the abdomen and left great toe  Received patient in bed, seems comfortable  Denies pain  The wound on abdomen was an old surgical wound that dehisced  The wound on the left great toe is an arterial wound  Patient spends most of her time in bed, she has a good appetite  Patient have indwelling catheter  January 24, 2023  Follow-up for wound on the buttocks, right great toe, and abdomen    Received patient in bed, seems comfortable  As per report, patient has been refusing the bordered foam for the wound on the sacrum  Patient spends most of her time in bed     1/31/2023 Follow up for wound on the follow-up for wound on buttocks and toes  Received patient, not in distress  Facility staff did not report any significant issues related to the wound  Denies pain  February 7, 2023  Follow-up for wound on the buttocks  Received patient in bed, seems comfortable  Denies pain  No significant issues related to the wound  2/14/2023  Follow-up for wound on the buttocks  Received patient in bed, seems comfortable  No significant issues related to the wound  Patient is on Clinitron  2/21/2023  Follow-up for wound to buttocks  Received patient in bed, seems comfortable  Wound dressing removed: none  Review of Systems   Constitutional: Negative  HENT: Negative  Eyes: Negative  Respiratory: Negative  Cardiovascular: Negative for chest pain and leg swelling  Gastrointestinal: Negative  Endocrine: Negative  Genitourinary: Negative  Musculoskeletal: Positive for gait problem  Skin: Positive for wound  See HPI   Neurological: Negative for dizziness and headaches  Psychiatric/Behavioral: Positive for confusion  Negative for behavioral problems  Objective:    Physical Exam  Constitutional:       Appearance: Normal appearance  HENT:      Head: Normocephalic  Nose: Nose normal       Mouth/Throat:      Pharynx: Oropharynx is clear  Eyes:      Conjunctiva/sclera: Conjunctivae normal    Pulmonary:      Effort: Pulmonary effort is normal    Abdominal:      Tenderness: There is no abdominal tenderness  There is no guarding  Genitourinary:     Comments: With indwelling catheter  Musculoskeletal:         General: No tenderness  Cervical back: Normal range of motion  Right lower leg: No edema  Left lower leg: No edema        Comments: LROM  paraplegia   Skin: Findings: Lesion present  Comments: Buttocks -wound size is 2 5x2 x 0 1 cm ,  100% epithelial, small amount of serous drainage, periwound is macerated, with no obvious sign of infection   Neurological:      Mental Status: She is alert  Gait: Gait abnormal    Psychiatric:         Mood and Affect: Mood normal          Behavior: Behavior normal               Procedures           Patient's care was coordinated with nursing facility staff  Recent vitals, labs and updated medications were reviewed on EMR or chart system of facility  Past Medical, surgical, social, medication and allergy history and patient's previous records were reviewed and updated as appropriate: Most up-to date information is available in the facility EMR where the patient is currently admitted      Patient Active Problem List   Diagnosis   • Neurogenic bladder   • Chronic osteomyelitis ([facility])   • Depression   • DVT (deep venous thrombosis) ([facility])   • Heart murmur   • HTN (hypertension)   • Hyperlipidemia   • Hyponatremia   • Pressure ulcer of contiguous region involving left buttock and hip, unstageable ([address] )   • Kidney lesion, native, right   • Neurogenic bowel   • Paralysis ([facility] [address] )   • 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[address] )   • Parkinson's disease ([address] )   • Postoperative anemia due to acute blood loss   • Seizure disorder ([facility])   • Protein-calorie malnutrition ([facility])   • Chronic right shoulder pain   • CHF (congestive heart failure) ([facility])   • Septic arthritis of hip ([facility])   • Atrial fibrillation with RVR ([facility])   • Chronic anticoagulation   • Pressure injury of 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 ([facility])   • Loose stools   • Anemia due to chronic illness   • Indigestion   • Deep tissue injury   • Open wound   • Venous ulcer ([address] )   • Pressure injury of left foot, unstageable ([address] )   • S/P exploratory laparotomy   • Arterial leg ulcer ([address] )   • Pressure injury of buttock, stage 3 ([address] )     Past Medical History:   Diagnosis Date   • Back pain    • Congestive heart failure ([facility])    • Depressive disorder    • Hypertension    • Neurogenic bladder    • Open wound of leg    • Osteoarthritis    • Osteomyelitis ([facility])    • Paraplegia ([facility])    • Seizure ([facility])    • Thrombosis    • Ulcer of ankle ([facility])    • Urinary retention      Past Surgical History:   Procedure Laterality Date   • CYSTOSCOPY     • SKIN GRAFT       Social History     Socioeconomic History   • Marital status: /Civil Union     Spouse name: Not on file   • Number of children: Not on file   • Years of education: Not on file   • Highest education level: Not on file   Occupational History   • Not on file   Tobacco Use   • Smoking status: Never   • Smokeless tobacco: Never   Substance and Sexual Activity   • Alcohol use: No   • Drug use: No   • Sexual activity: Not on file   Other Topics Concern   • Not on file   Social History Narrative   • Not on file     Social Determinants of Health     Financial Resource Strain: Not on file   Food Insecurity: Not on file   Transportation Needs: Not on file   Physical Activity: Not on file   Stress: Not on file   Social Connections: Not on file   Intimate Partner Violence: Not on file   Housing Stability: Not on file        Current Outpatient Medications:   •  acetaminophen (TYLENOL) 325 mg tablet, Take 650 mg by mouth every 4 (four) hours as needed, Disp: , Rfl:   •  amLODIPine-benazepril (LOTREL) 10-20 MG per capsule, Take by mouth , Disp: , Rfl:   •  atorvastatin (LIPITOR) 20 mg tablet, Take 20 mg by mouth daily , Disp: , Rfl:   •  Biotin 10 MG CAPS, Take 10 mg by mouth daily, Disp: , Rfl:   •  bisacodyl (Dulcolax) 10 mg suppository, Insert 10 mg into the rectum daily, Disp: , Rfl:   •  carbidopa-levodopa (SINEMET)  mg per tablet, Take 1 tablet by mouth 4 (four) times a day, Disp: , Rfl:   •  Cranberry 450 MG TABS, Take 450 mg by mouth, Disp: , Rfl:   •  famotidine (PEPCID) 20 mg tablet, Take 20 mg by mouth 2 (two) times a day, Disp: , Rfl:   • furosemide (LASIX) 20 mg tablet, Take 20 mg by mouth daily, Disp: , Rfl:   •  magnesium hydroxide (MILK OF MAGNESIA) 400 mg/5 mL oral suspension, Take by mouth daily as needed for constipation, Disp: , Rfl:   •  metoprolol tartrate (LOPRESSOR) 50 mg tablet, Take 50 mg by mouth 2 (two) times a day, Disp: , Rfl:   •  mirtazapine (REMERON) 15 mg tablet, Take 7 5 mg by mouth daily at bedtime, Disp: , Rfl:   •  Multiple Vitamin (DAILY VALUE MULTIVITAMIN) TABS, Take 1 tablet by mouth daily , Disp: , Rfl:   •  Multiple Vitamins-Minerals (HAIR SKIN NAILS PO), Take 3 tablets by mouth daily, Disp: , Rfl:   •  Nutritional Supplements (Micha) PACK, Take by mouth, Disp: , Rfl:   •  Omega-3 Fatty Acids (FISH OIL) 1,000 mg, Take 1,000 mg by mouth daily , Disp: , Rfl:   •  oxyCODONE (ROXICODONE) 5 mg immediate release tablet, Take 1 tablet (5 mg total) by mouth every 6 (six) hours as needed for moderate painMax Daily Amount: 20 mg, Disp: 15 tablet, Rfl: 0  •  phenytoin (DILANTIN) 100 mg ER capsule, Take 100 mg by mouth every 12 (twelve) hours , Disp: , Rfl:   •  potassium chloride (K-DUR,KLOR-CON) 10 mEq tablet, , Disp: , Rfl:   •  psyllium (Metamucil) 0 52 g capsule, Take 0 52 g by mouth daily, Disp: , Rfl:   •  saccharomyces boulardii (Florastor) 250 mg capsule, Take 250 mg by mouth 2 (two) times a day, Disp: , Rfl:   •  senna (SENOKOT) 8 6 MG tablet, Take 8 6 mg by mouth 2 (two) times a day, Disp: , Rfl:   •  sertraline (ZOLOFT) 25 mg tablet, Take 100 mg by mouth daily, Disp: , Rfl:   •  sodium phosphate-biphosphate (FLEET) 7-19 g 118 mL enema, Insert 1 enema into the rectum, Disp: , Rfl:   •  vitamin B-12 (CYANOCOBALAMIN) 250 MCG tablet, Take 500 mcg by mouth daily, Disp: , Rfl:   •  warfarin (COUMADIN) 6 mg tablet, Take 6 mg by mouth daily, Disp: , Rfl:   Family History   Problem Relation Age of Onset   • Diabetes Father    • Kidney cancer Father    • Alzheimer's disease Mother    • Bone cancer Paternal Uncle    • Lung cancer Paternal Uncle               Coordination of Care: Wound team aware of the treatment plan  Facility nurse will provide wound treatment and monitor the wound for any changes  Patient / Staff education : Patient / Staff was given education on sign of infection and pressure ulcer prevention  Patient/ Staff verbalized understanding     Follow up :  Next week    Voice-recognition software may have been used in the preparation of this document  Occasional wrong word or "sound-alike" substitutions may have occurred due to the inherent limitations of voice recognition software  Interpretation should be guided by context        SVETLANA Caputo

## 2023-02-21 NOTE — ASSESSMENT & PLAN NOTE
The wound on buttock decreased, 2 5x2 compared to last week measurement of 3x3   100% epithelial, with macerated periwound    Continue Triad paste and ABD pad  Continue to offload, on Clinitron  Follow-up next week

## 2023-02-28 ENCOUNTER — NURSING HOME VISIT (OUTPATIENT)
Dept: WOUND CARE | Facility: HOSPITAL | Age: 73
End: 2023-02-28

## 2023-02-28 DIAGNOSIS — L89.303 PRESSURE INJURY OF BUTTOCK, STAGE 3, UNSPECIFIED LATERALITY (HCC): Primary | ICD-10-CM

## 2023-02-28 NOTE — PROGRESS NOTES
[address] MANAGEMENT   AND [facility]PER[facility]IC [facility]INE CENTER       Patient ID: Earlene Thurston is a 71 y o  female Date of Birth 1950     Location of Service: [facility]     Performed wound round with: Wound team     Chief Complaint : Buttocks    Wound Instructions:  Wound: Buttocks  Discontinue previous wound order  Cleanse the wound bed with NSS   Apply non-sting skin prep to periwound area  Apply Triad paste to wound bed and cover with ABD pad and secure with medipore tape  Frequency : Daily and prn for soiling    Left great toe -apply Skin-Prep daily, leave open to air  Offload all wounds  Turn and reposition frequently  Increase protein intake  Monitor for any sign of infection or worsening, inform PCP or patient's primary physician in your facility  Allergies  Latex and Other      Assessment & Plan:  1  Pressure injury of buttock, stage 3, unspecified laterality ([facility] [address] )  Assessment & Plan: The wound on the buttocks improved compared to last week, 2 x 1 cm ,  100% epithelial   This wound will heal faster if patient had not been refusing the ABD pad   -Continue current treatment, Triad and ABD pad  -Follow-up in 2 weeks             Subjective:   January 17, 2023  This is a new referral for wound on the buttocks, toes, and abdomen  Patient was referred by the geriatric team   As per report, patient was recently sent in [facility] acute care on January 10, 2023 for nausea and vomiting and while in the acute care, she developed pressure injury on the buttocks  Patient also admitted with a wound on the abdomen and left great toe  Received patient in bed, seems comfortable  Denies pain  The wound on abdomen was an old surgical wound that dehisced  The wound on the left great toe is an arterial wound  Patient spends most of her time in bed, she has a good appetite  Patient have indwelling catheter  January 24, 2023  Follow-up for wound on the buttocks, right great toe, and abdomen  Received patient in bed, seems comfortable  As per report, patient has been refusing the bordered foam for the wound on the sacrum  Patient spends most of her time in bed     1/31/2023 Follow up for wound on the follow-up for wound on buttocks and toes  Received patient, not in distress  Facility staff did not report any significant issues related to the wound  Denies pain  February 7, 2023  Follow-up for wound on the buttocks  Received patient in bed, seems comfortable  Denies pain  No significant issues related to the wound  2/14/2023  Follow-up for wound on the buttocks  Received patient in bed, seems comfortable  No significant issues related to the wound  Patient is on Clinitron  2/21/2023  Follow-up for wound to buttocks  Received patient in bed, seems comfortable  Wound dressing removed: none  2/28/2023 Follow up for wound on the buttocks  Received patient, not in distress  Patient has been refusing ABD pad  Explained to the patient that the wound is not healing due to constant shearing without the ABD pad  Patient still refused ABD pad  Patient aware that the wound can worsen without ABD pad  Review of Systems   Constitutional: Negative  HENT: Negative  Eyes: Negative  Respiratory: Negative  Cardiovascular: Negative for chest pain and leg swelling  Gastrointestinal: Negative  Endocrine: Negative  Genitourinary: Negative  Musculoskeletal: Positive for gait problem  Skin: Positive for wound  See HPI   Neurological: Negative for dizziness and headaches  Psychiatric/Behavioral: Positive for confusion  Negative for behavioral problems  Objective:    Physical Exam  Constitutional:       Appearance: Normal appearance  HENT:      Head: Normocephalic  Nose: Nose normal       Mouth/Throat:      Pharynx: Oropharynx is clear     Eyes:      Conjunctiva/sclera: Conjunctivae normal    Pulmonary:      Effort: Pulmonary effort is normal  Abdominal:      Tenderness: There is no abdominal tenderness  There is no guarding  Genitourinary:     Comments: With indwelling catheter  Musculoskeletal:         General: No tenderness  Cervical back: Normal range of motion  Right lower leg: No edema  Left lower leg: No edema  Comments: LROM  paraplegia   Skin:     Findings: Lesion present  Comments: Buttocks -wound size is 2 x 1 x 0 1 cm ,  100% epithelial, small amount of serous drainage, periwound is macerated, with no obvious sign of infection   Neurological:      Mental Status: She is alert  Gait: Gait abnormal    Psychiatric:         Mood and Affect: Mood normal          Behavior: Behavior normal               Procedures           Patient's care was coordinated with nursing facility staff  Recent vitals, labs and updated medications were reviewed on EMR or chart system of facility  Past Medical, surgical, social, medication and allergy history and patient's previous records were reviewed and updated as appropriate: Most up-to date information is available in the facility EMR where the patient is currently admitted      Patient Active Problem List   Diagnosis   • Neurogenic bladder   • Chronic osteomyelitis ([facility])   • Depression   • DVT (deep venous thrombosis) ([facility])   • Heart murmur   • HTN (hypertension)   • Hyperlipidemia   • Hyponatremia   • Pressure ulcer of contiguous region involving left buttock and hip, unstageable ([address] )   • Kidney lesion, native, right   • Neurogenic bowel   • Paralysis ([address] )   • 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[address] )   • Parkinson's disease ([address] )   • Postoperative anemia due to acute blood loss   • Seizure disorder ([facility])   • Protein-calorie malnutrition ([facility])   • Chronic right shoulder pain   • CHF (congestive heart failure) ([facility])   • Septic arthritis of hip ([facility])   • Atrial fibrillation with RVR ([facility])   • Chronic anticoagulation   • Pressure injury of 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 ([facility])   • Loose stools   • Anemia due to chronic illness   • Indigestion   • Deep tissue injury   • Open wound   • Venous ulcer (HCC)   • Pressure injury of left foot, unstageable (HCC)   • S/P exploratory laparotomy   • Arterial leg ulcer (HCC)   • Pressure injury of buttock, stage 3 (HCC)     Past Medical History:   Diagnosis Date   • Back pain    • Congestive heart failure (HCC)    • Depressive disorder    • Hypertension    • Neurogenic bladder    • Open wound of leg    • Osteoarthritis    • Osteomyelitis (HCC)    • Paraplegia (HCC)    • Seizure (HCC)    • Thrombosis    • Ulcer of ankle ([facility] [address] )    • Urinary retention      Past Surgical History:   Procedure Laterality Date   • CYSTOSCOPY     • SKIN GRAFT       Social History     Socioeconomic History   • Marital status: /Civil Union     Spouse name: None   • Number of children: None   • Years of education: None   • Highest education level: None   Occupational History   • None   Tobacco Use   • Smoking status: Never   • Smokeless tobacco: Never   Substance and Sexual Activity   • Alcohol use: No   • Drug use: No   • Sexual activity: None   Other Topics Concern   • None   Social History Narrative   • None     Social Determinants of Health     Financial Resource Strain: Not on file   Food Insecurity: Not on file   Transportation Needs: Not on file   Physical Activity: Not on file   Stress: Not on file   Social Connections: Not on file   Intimate Partner Violence: Not on file   Housing Stability: Not on file        Current Outpatient Medications:   •  acetaminophen (TYLENOL) 325 mg tablet, Take 650 mg by mouth every 4 (four) hours as needed, Disp: , Rfl:   •  amLODIPine-benazepril (LOTREL) 10-20 MG per capsule, Take by mouth , Disp: , Rfl:   •  atorvastatin (LIPITOR) 20 mg tablet, Take 20 mg by mouth daily , Disp: , Rfl:   •  Biotin 10 MG CAPS, Take 10 mg by mouth daily, Disp: , Rfl:   •  bisacodyl (Dulcolax) 10 mg suppository, Insert 10 mg into the rectum daily, Disp: , Rfl: •  carbidopa-levodopa (SINEMET)  mg per tablet, Take 1 tablet by mouth 4 (four) times a day, Disp: , Rfl:   •  Cranberry 450 MG TABS, Take 450 mg by mouth, Disp: , Rfl:   •  famotidine (PEPCID) 20 mg tablet, Take 20 mg by mouth 2 (two) times a day, Disp: , Rfl:   •  furosemide (LASIX) 20 mg tablet, Take 20 mg by mouth daily, Disp: , Rfl:   •  magnesium hydroxide (MILK OF MAGNESIA) 400 mg/5 mL oral suspension, Take by mouth daily as needed for constipation, Disp: , Rfl:   •  metoprolol tartrate (LOPRESSOR) 50 mg tablet, Take 50 mg by mouth 2 (two) times a day, Disp: , Rfl:   •  mirtazapine (REMERON) 15 mg tablet, Take 7 5 mg by mouth daily at bedtime, Disp: , Rfl:   •  Multiple Vitamin (DAILY VALUE MULTIVITAMIN) TABS, Take 1 tablet by mouth daily , Disp: , Rfl:   •  Multiple Vitamins-Minerals (HAIR SKIN NAILS PO), Take 3 tablets by mouth daily, Disp: , Rfl:   •  Nutritional Supplements (Micha) PACK, Take by mouth, Disp: , Rfl:   •  Omega-3 Fatty Acids (FISH OIL) 1,000 mg, Take 1,000 mg by mouth daily , Disp: , Rfl:   •  oxyCODONE (ROXICODONE) 5 mg immediate release tablet, Take 1 tablet (5 mg total) by mouth every 6 (six) hours as needed for moderate painMax Daily Amount: 20 mg, Disp: 15 tablet, Rfl: 0  •  phenytoin (DILANTIN) 100 mg ER capsule, Take 100 mg by mouth every 12 (twelve) hours , Disp: , Rfl:   •  potassium chloride (K-DUR,KLOR-CON) 10 mEq tablet, , Disp: , Rfl:   •  psyllium (Metamucil) 0 52 g capsule, Take 0 52 g by mouth daily, Disp: , Rfl:   •  saccharomyces boulardii (Florastor) 250 mg capsule, Take 250 mg by mouth 2 (two) times a day, Disp: , Rfl:   •  senna (SENOKOT) 8 6 MG tablet, Take 8 6 mg by mouth 2 (two) times a day, Disp: , Rfl:   •  sertraline (ZOLOFT) 25 mg tablet, Take 100 mg by mouth daily, Disp: , Rfl:   •  sodium phosphate-biphosphate (FLEET) 7-19 g 118 mL enema, Insert 1 enema into the rectum, Disp: , Rfl:   •  vitamin B-12 (CYANOCOBALAMIN) 250 MCG tablet, Take 500 mcg by mouth daily, Disp: , Rfl:   •  warfarin (COUMADIN) 6 mg tablet, Take 6 mg by mouth daily, Disp: , Rfl:   Family History   Problem Relation Age of Onset   • Diabetes Father    • Kidney cancer Father    • Alzheimer's disease Mother    • Bone cancer Paternal Uncle    • Lung cancer Paternal Uncle               Coordination of Care: Wound team aware of the treatment plan  Facility nurse will provide wound treatment and monitor the wound for any changes  Patient / Staff education : Patient / Staff was given education on sign of infection and pressure ulcer prevention  Patient/ Staff verbalized understanding     Follow up :  2 weeks    Voice-recognition software may have been used in the preparation of this document  Occasional wrong word or "sound-alike" substitutions may have occurred due to the inherent limitations of voice recognition software  Interpretation should be guided by context        SVETLANA Gabriel

## 2023-02-28 NOTE — ASSESSMENT & PLAN NOTE
The wound on the buttocks improved compared to last week, 2 x 1 cm ,  100% epithelial   This wound will heal faster if patient had not been refusing the ABD pad   -Continue current treatment, Triad and ABD pad  -Follow-up in 2 weeks

## 2023-03-14 ENCOUNTER — NURSING HOME VISIT (OUTPATIENT)
Dept: WOUND CARE | Facility: HOSPITAL | Age: 73
End: 2023-03-14

## 2023-03-14 DIAGNOSIS — L89.303 PRESSURE INJURY OF BUTTOCK, STAGE 3, UNSPECIFIED LATERALITY (HCC): Primary | ICD-10-CM

## 2023-03-15 NOTE — ASSESSMENT & PLAN NOTE
The wound on the buttocks is healed  I ordered Z guard for moisture management and prevention of pressure injury  Patient will continue to use Clinitron bed  Sign off  Facility staff will continue to provide treatment and monitor the wound  Can reconsult wound nurse practitioner if wound failed to heal or worsened

## 2023-03-15 NOTE — PROGRESS NOTES
[address] MANAGEMENT   AND HYPERBARIC MEDICINE CENTER       Patient ID: Kody Pradhan is a 71 y o  female Date of Birth 1950     Location of Service: [facility]    Performed wound round with: Wound team     Chief Complaint : Buttocks    Wound Instructions:  Continue to apply Z guard on the buttocks twice a day for prevention    Offload all wounds  Turn and reposition frequently  Increase protein intake  Monitor for any sign of infection or worsening, inform PCP or patient's primary physician in your facility  Allergies  Latex and Other      Assessment & Plan:  1  Pressure injury of buttock, stage 3, unspecified laterality ([facility] [address] )  Assessment & Plan: The wound on the buttocks is healed  I ordered Z guard for moisture management and prevention of pressure injury  Patient will continue to use Clinitron bed  Sign off  Facility staff will continue to provide treatment and monitor the wound  Can reconsult wound nurse practitioner if wound failed to heal or worsened  Subjective:   January 17, 2023  This is a new referral for wound on the buttocks, toes, and abdomen  Patient was referred by the geriatric team   As per report, patient was recently sent in [facility] acute care on January 10, 2023 for nausea and vomiting and while in the acute care, she developed pressure injury on the buttocks  Patient also admitted with a wound on the abdomen and left great toe  Received patient in bed, seems comfortable  Denies pain  The wound on abdomen was an old surgical wound that dehisced  The wound on the left great toe is an arterial wound  Patient spends most of her time in bed, she has a good appetite  Patient have indwelling catheter  January 24, 2023  Follow-up for wound on the buttocks, right great toe, and abdomen  Received patient in bed, seems comfortable  As per report, patient has been refusing the bordered foam for the wound on the sacrum    Patient spends most of her time in bed     1/31/2023 Follow up for wound on the follow-up for wound on buttocks and toes  Received patient, not in distress  Facility staff did not report any significant issues related to the wound  Denies pain  February 7, 2023  Follow-up for wound on the buttocks  Received patient in bed, seems comfortable  Denies pain  No significant issues related to the wound  2/14/2023  Follow-up for wound on the buttocks  Received patient in bed, seems comfortable  No significant issues related to the wound  Patient is on Clinitron  2/21/2023  Follow-up for wound to buttocks  Received patient in bed, seems comfortable  Wound dressing removed: none  2/28/2023 Follow up for wound on the buttocks  Received patient, not in distress  Patient has been refusing ABD pad  Explained to the patient that the wound is not healing due to constant shearing without the ABD pad  Patient still refused ABD pad  Patient aware that the wound can worsen without ABD pad  March 14, 2023  Follow-up for wound on the buttocks  Received patient in bed, seems comfortable  Denies pain  No significant issues related to the wound  Review of Systems   Constitutional: Negative  HENT: Negative  Eyes: Negative  Respiratory: Negative  Cardiovascular: Negative for chest pain and leg swelling  Gastrointestinal: Negative  Endocrine: Negative  Genitourinary: Negative  Musculoskeletal: Positive for gait problem  Skin: Positive for wound  See HPI   Neurological: Negative for dizziness and headaches  Psychiatric/Behavioral: Positive for confusion  Negative for behavioral problems  Objective:    Physical Exam  Constitutional:       Appearance: Normal appearance  HENT:      Head: Normocephalic  Nose: Nose normal       Mouth/Throat:      Pharynx: Oropharynx is clear     Eyes:      Conjunctiva/sclera: Conjunctivae normal    Pulmonary:      Effort: Pulmonary effort is normal  Abdominal:      Tenderness: There is no abdominal tenderness  There is no guarding  Genitourinary:     Comments: With indwelling catheter  Musculoskeletal:         General: No tenderness  Cervical back: Normal range of motion  Right lower leg: No edema  Left lower leg: No edema  Comments: LROM  paraplegia   Skin:     Findings: Lesion present  Comments: Buttocks -wound is healed   Neurological:      Mental Status: She is alert  Gait: Gait abnormal    Psychiatric:         Mood and Affect: Mood normal          Behavior: Behavior normal               Procedures           Patient's care was coordinated with nursing facility staff  Recent vitals, labs and updated medications were reviewed on EMR or chart system of facility  Past Medical, surgical, social, medication and allergy history and patient's previous records were reviewed and updated as appropriate: Most up-to date information is available in the facility EMR where the patient is currently admitted      Patient Active Problem List   Diagnosis   • Neurogenic bladder   • Chronic osteomyelitis ([facility])   • Depression   • DVT (deep venous thrombosis) ([facility])   • Heart murmur   • HTN (hypertension)   • Hyperlipidemia   • Hyponatremia   • Pressure ulcer of contiguous region involving left buttock and hip, unstageable ([address] )   • Kidney lesion, native, right   • Neurogenic bowel   • Paralysis ([address] )   • 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[address] )   • Parkinson's disease ([address] )   • Postoperative anemia due to acute blood loss   • Seizure disorder ([facility])   • Protein-calorie malnutrition ([facility])   • Chronic right shoulder pain   • CHF (congestive heart failure) ([facility])   • Septic arthritis of hip ([facility])   • Atrial fibrillation with RVR ([facility])   • Chronic anticoagulation   • Pressure injury of 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 ([facility])   • Loose stools   • Anemia due to chronic illness   • Indigestion   • Deep tissue injury   • Open wound   • Venous ulcer ([address] )   • Pressure injury of left foot, unstageable ([facility])   • S/P exploratory laparotomy   • Arterial leg ulcer (HCC)   • Pressure injury of buttock, stage 3 (HCC)     Past Medical History:   Diagnosis Date   • Back pain    • Congestive heart failure (HCC)    • Depressive disorder    • Hypertension    • Neurogenic bladder    • Open wound of leg    • Osteoarthritis    • Osteomyelitis (HCC)    • Paraplegia (HCC)    • Seizure (HCC)    • Thrombosis    • Ulcer of ankle ([facility] [address] )    • Urinary retention      Past Surgical History:   Procedure Laterality Date   • CYSTOSCOPY     • SKIN GRAFT       Social History     Socioeconomic History   • Marital status: /Civil Union     Spouse name: None   • Number of children: None   • Years of education: None   • Highest education level: None   Occupational History   • None   Tobacco Use   • Smoking status: Never   • Smokeless tobacco: Never   Substance and Sexual Activity   • Alcohol use: No   • Drug use: No   • Sexual activity: None   Other Topics Concern   • None   Social History Narrative   • None     Social Determinants of Health     Financial Resource Strain: Not on file   Food Insecurity: Not on file   Transportation Needs: Not on file   Physical Activity: Not on file   Stress: Not on file   Social Connections: Not on file   Intimate Partner Violence: Not on file   Housing Stability: Not on file        Current Outpatient Medications:   •  acetaminophen (TYLENOL) 325 mg tablet, Take 650 mg by mouth every 4 (four) hours as needed, Disp: , Rfl:   •  amLODIPine-benazepril (LOTREL) 10-20 MG per capsule, Take by mouth , Disp: , Rfl:   •  atorvastatin (LIPITOR) 20 mg tablet, Take 20 mg by mouth daily , Disp: , Rfl:   •  Biotin 10 MG CAPS, Take 10 mg by mouth daily, Disp: , Rfl:   •  bisacodyl (Dulcolax) 10 mg suppository, Insert 10 mg into the rectum daily, Disp: , Rfl:   •  carbidopa-levodopa (SINEMET)  mg per tablet, Take 1 tablet by mouth 4 (four) times a day, Disp: , Rfl:   • Cranberry 450 MG TABS, Take 450 mg by mouth, Disp: , Rfl:   •  famotidine (PEPCID) 20 mg tablet, Take 20 mg by mouth 2 (two) times a day, Disp: , Rfl:   •  furosemide (LASIX) 20 mg tablet, Take 20 mg by mouth daily, Disp: , Rfl:   •  magnesium hydroxide (MILK OF MAGNESIA) 400 mg/5 mL oral suspension, Take by mouth daily as needed for constipation, Disp: , Rfl:   •  metoprolol tartrate (LOPRESSOR) 50 mg tablet, Take 50 mg by mouth 2 (two) times a day, Disp: , Rfl:   •  mirtazapine (REMERON) 15 mg tablet, Take 7 5 mg by mouth daily at bedtime, Disp: , Rfl:   •  Multiple Vitamin (DAILY VALUE MULTIVITAMIN) TABS, Take 1 tablet by mouth daily , Disp: , Rfl:   •  Multiple Vitamins-Minerals (HAIR SKIN NAILS PO), Take 3 tablets by mouth daily, Disp: , Rfl:   •  Nutritional Supplements (Micha) PACK, Take by mouth, Disp: , Rfl:   •  Omega-3 Fatty Acids (FISH OIL) 1,000 mg, Take 1,000 mg by mouth daily , Disp: , Rfl:   •  oxyCODONE (ROXICODONE) 5 mg immediate release tablet, Take 1 tablet (5 mg total) by mouth every 6 (six) hours as needed for moderate painMax Daily Amount: 20 mg, Disp: 15 tablet, Rfl: 0  •  phenytoin (DILANTIN) 100 mg ER capsule, Take 100 mg by mouth every 12 (twelve) hours , Disp: , Rfl:   •  potassium chloride (K-DUR,KLOR-CON) 10 mEq tablet, , Disp: , Rfl:   •  psyllium (Metamucil) 0 52 g capsule, Take 0 52 g by mouth daily, Disp: , Rfl:   •  saccharomyces boulardii (Florastor) 250 mg capsule, Take 250 mg by mouth 2 (two) times a day, Disp: , Rfl:   •  senna (SENOKOT) 8 6 MG tablet, Take 8 6 mg by mouth 2 (two) times a day, Disp: , Rfl:   •  sertraline (ZOLOFT) 25 mg tablet, Take 100 mg by mouth daily, Disp: , Rfl:   •  sodium phosphate-biphosphate (FLEET) 7-19 g 118 mL enema, Insert 1 enema into the rectum, Disp: , Rfl:   •  vitamin B-12 (CYANOCOBALAMIN) 250 MCG tablet, Take 500 mcg by mouth daily, Disp: , Rfl:   •  warfarin (COUMADIN) 6 mg tablet, Take 6 mg by mouth daily, Disp: , Rfl:   Family History Problem Relation Age of Onset   • Diabetes Father    • Kidney cancer Father    • Alzheimer's disease Mother    • Bone cancer Paternal Uncle    • Lung cancer Paternal Uncle               Coordination of Care: Wound team aware of the treatment plan  Facility nurse will provide wound treatment and monitor the wound for any changes  Patient / Staff education : Patient / Staff was given education on sign of infection and pressure ulcer prevention  Patient/ Staff verbalized understanding     Follow up :  Sign of    Voice-recognition software may have been used in the preparation of this document  Occasional wrong word or "sound-alike" substitutions may have occurred due to the inherent limitations of voice recognition software  Interpretation should be guided by context        SVETLANA Higgins

## 2023-09-05 ENCOUNTER — NURSING HOME VISIT (OUTPATIENT)
Dept: WOUND CARE | Facility: HOSPITAL | Age: 73
End: 2023-09-05
Payer: MEDICARE

## 2023-09-05 DIAGNOSIS — Z98.890 S/P EXPLORATORY LAPAROTOMY: ICD-10-CM

## 2023-09-05 DIAGNOSIS — L89.152 PRESSURE INJURY OF SACRAL REGION, STAGE 2 (HCC): ICD-10-CM

## 2023-09-05 DIAGNOSIS — T83.010A SUPRAPUBIC CATHETER DYSFUNCTION, INITIAL ENCOUNTER (HCC): ICD-10-CM

## 2023-09-05 DIAGNOSIS — L97.909 ARTERIAL LEG ULCER (HCC): Primary | ICD-10-CM

## 2023-09-05 DIAGNOSIS — G82.20 PARAPLEGIA FOLLOWING SPINAL CORD INJURY (HCC): ICD-10-CM

## 2023-09-05 PROCEDURE — 99309 SBSQ NF CARE MODERATE MDM 30: CPT | Performed by: NURSE PRACTITIONER

## 2023-09-05 NOTE — ASSESSMENT & PLAN NOTE
Sacrum  100% epithelial, with no obvious sign of infection  Local wound care with Triad paste  Continue to offload  Follow-up next week

## 2023-09-05 NOTE — PROGRESS NOTES
[facility] [place]       Patient ID: Margaretmary Holter is a 68 y.o. female Date of Birth 1950     Location of Service: [id number] [place] [address]    Performed wound round with: Wound team     Chief Complaint : Sacrum, abdomen, right lower leg wounds    Wound Instructions:  Wound: Sacrum and right lower leg  Cleanse with normal saline solution wound cleanser  Apply Skin-Prep to periwound area  Apply Triad paste to wound bed  Daily and as needed for soiling    Wound: Abdomen  Cleanse with normal saline solution or wound cleanser  Apply Skin-Prep to periwound area  Apply Puracol or collagen substitute to wound bed and cover with ABD pad  Daily and as needed for soiling    Offload all wounds  Turn and reposition frequently  Increase protein intake. Monitor for any sign of infection or worsening, inform PCP or patient's primary physician in your facility. Allergies  Latex and Other      Assessment & Plan:  1. Arterial leg ulcer (HCC)  Assessment & Plan:  Right lower leg  Partial-thickness, with no obvious sign of infection  Local wound care with Triad paste  Plan offload  Follow-up next week      2. S/P exploratory laparotomy  Assessment & Plan:  S/p ex lap, sigmoidectomy on 7/2/2022  -Wound was healed on October, 2022. Wound reopened and heal again on January, 2024. Wound reopened again.  -100% granulation, with no obvious sign of infection  Local wound care with collagen  Continue to offload  Follow-up next week      3. Paraplegia following spinal cord injury [facility])  Assessment & Plan:  24/7 restorative care  Currently on pressure ulcer prevention protocol in the facility      4. Pressure injury of sacral region, stage 2 (HCC)  Assessment & Plan:  Sacrum  100% epithelial, with no obvious sign of infection  Local wound care with Triad paste  Continue to offload  Follow-up next week      5.  Suprapubic catheter dysfunction, initial encounter [facility])  Assessment & Plan:  Suprapubic catheter dislodged, with large amount of incontinent urine  Facility staff aware, facility staff to reinsert new suprapubic catheter             Subjective:   September 5, 2023. New consult for wound on the buttocks, abdomen, and right lower leg. As per medical record review, patient was readmitted at [facility] for bowel obstruction on and was discharged back to short-term rehab. During her stay at [facility], she had a wound on the abdomen. Wound VAC was placed on the abdomen from August 28 to 30. Currently on local wound care. Patient also has wound on buttocks and right lower leg. Review of Systems   Constitutional: Negative. Respiratory: Negative. Gastrointestinal: Negative. Genitourinary:        Suprapubic catheter dislodges       Objective:    Physical Exam  Constitutional:       Appearance: Normal appearance. Cardiovascular:      Rate and Rhythm: Normal rate. Pulmonary:      Effort: Pulmonary effort is normal.   Genitourinary:     Comments: Suprapubic catheter dislodged, incontinent of large urine  Musculoskeletal:      Comments: paraplegia   Skin:     Findings: Lesion present. Comments: Sacrum: Wound size is 0.5 x 0.5 x 0.1 cm.,  100% epithelial, periwound is normal, no drainage, no obvious sign of infection    Abdomen: Wound size is 3.5 x 1.5 x 0.1 centimeters. ,  100% granulation, periwound is normal, small amount of serous drainage, no obvious sign of infection    Right lower leg: Wound size is 1 x 0.2 x 0.1 cm.,  100% epithelial, small amount of serous drainage, periwound is normal, with no obvious sign of infection   Neurological:      Mental Status: She is alert. Procedures           Patient's care was coordinated with nursing facility staff. Recent vitals, labs and updated medications were reviewed on EMR or chart system of facility.  Past Medical, surgical, social, medication and allergy history and patient's previous records were reviewed and updated as appropriate: Most up-to date information is available in the facility EMR where the patient is currently admitted.     Patient Active Problem List   Diagnosis   • Neurogenic bladder   • Chronic osteomyelitis (HCC)   • Depression   • DVT (deep venous thrombosis) (HCC)   • Heart murmur   • HTN (hypertension)   • Hyperlipidemia   • Hyponatremia   • Pressure ulcer of contiguous region involving left buttock and hip, unstageable (HCC)   • Kidney lesion, native, right   • Neurogenic bowel   • Paralysis ([address])   • 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[address])   • Parkinson's disease ([address])   • Postoperative anemia due to acute blood loss   • Seizure disorder (HCC)   • Protein-calorie malnutrition (HCC)   • Chronic right shoulder pain   • CHF (congestive heart failure) (HCC)   • Septic arthritis of hip (HCC)   • Atrial fibrillation with RVR (HCC)   • Chronic anticoagulation   • Pressure injury of 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 (HCC)   • Loose stools   • Anemia due to chronic illness   • Indigestion   • Deep tissue injury   • Open wound   • Venous ulcer (HCC)   • Pressure injury of left foot, unstageable ([address])   • S/P exploratory laparotomy   • Arterial leg ulcer (HCC)   • Pressure injury of buttock, stage 3 (HCC)   • Pressure injury of sacral region, stage 2 ([address])   • Suprapubic catheter dysfunction (HCC)     Past Medical History:   Diagnosis Date   • Back pain    • Congestive heart failure (HCC)    • Depressive disorder    • Hypertension    • Neurogenic bladder    • Open wound of leg    • Osteoarthritis    • Osteomyelitis (HCC)    • Paraplegia (HCC)    • Seizure (HCC)    • Thrombosis    • Ulcer of ankle ([address])    • Urinary retention      Past Surgical History:   Procedure Laterality Date   • CYSTOSCOPY     • SKIN GRAFT       Social History     Socioeconomic History   • Marital status: /Civil Union     Spouse name: None   • Number of children: None   • Years of education: None   • Highest education level: None   Occupational History   • None   Tobacco Use   • Smoking status: Never   • Smokeless tobacco: Never   Substance and Sexual Activity   • Alcohol use: No   • Drug use: No   • Sexual activity: None   Other Topics Concern   • None   Social History Narrative   • None     Social Determinants of Health     Financial Resource Strain: Not on file   Food Insecurity: Not on file   Transportation Needs: Not on file   Physical Activity: Not on file   Stress: Not on file   Social Connections: Not on file   Intimate Partner Violence: Not on file   Housing Stability: Not on file        Current Outpatient Medications:   •  acetaminophen (TYLENOL) 325 mg tablet, Take 650 mg by mouth every 4 (four) hours as needed, Disp: , Rfl:   •  amLODIPine-benazepril (LOTREL) 10-20 MG per capsule, Take by mouth , Disp: , Rfl:   •  atorvastatin (LIPITOR) 20 mg tablet, Take 20 mg by mouth daily , Disp: , Rfl:   •  Biotin 10 MG CAPS, Take 10 mg by mouth daily, Disp: , Rfl:   •  bisacodyl (Dulcolax) 10 mg suppository, Insert 10 mg into the rectum daily, Disp: , Rfl:   •  carbidopa-levodopa (SINEMET)  mg per tablet, Take 1 tablet by mouth 4 (four) times a day, Disp: , Rfl:   •  Cranberry 450 MG TABS, Take 450 mg by mouth, Disp: , Rfl:   •  famotidine (PEPCID) 20 mg tablet, Take 20 mg by mouth 2 (two) times a day, Disp: , Rfl:   •  furosemide (LASIX) 20 mg tablet, Take 20 mg by mouth daily, Disp: , Rfl:   •  magnesium hydroxide (MILK OF MAGNESIA) 400 mg/5 mL oral suspension, Take by mouth daily as needed for constipation, Disp: , Rfl:   •  metoprolol tartrate (LOPRESSOR) 50 mg tablet, Take 50 mg by mouth 2 (two) times a day, Disp: , Rfl:   •  mirtazapine (REMERON) 15 mg tablet, Take 7.5 mg by mouth daily at bedtime, Disp: , Rfl:   •  Multiple Vitamin (DAILY VALUE MULTIVITAMIN) TABS, Take 1 tablet by mouth daily , Disp: , Rfl:   •  Multiple Vitamins-Minerals (HAIR SKIN NAILS PO), Take 3 tablets by mouth daily, Disp: , Rfl: •  Nutritional Supplements (Micha) PACK, Take by mouth, Disp: , Rfl:   •  Omega-3 Fatty Acids (FISH OIL) 1,000 mg, Take 1,000 mg by mouth daily , Disp: , Rfl:   •  oxyCODONE (ROXICODONE) 5 mg immediate release tablet, Take 1 tablet (5 mg total) by mouth every 6 (six) hours as needed for moderate painMax Daily Amount: 20 mg, Disp: 15 tablet, Rfl: 0  •  phenytoin (DILANTIN) 100 mg ER capsule, Take 100 mg by mouth every 12 (twelve) hours , Disp: , Rfl:   •  potassium chloride (K-DUR,KLOR-CON) 10 mEq tablet, , Disp: , Rfl:   •  psyllium (Metamucil) 0.52 g capsule, Take 0.52 g by mouth daily, Disp: , Rfl:   •  saccharomyces boulardii (Florastor) 250 mg capsule, Take 250 mg by mouth 2 (two) times a day, Disp: , Rfl:   •  senna (SENOKOT) 8.6 MG tablet, Take 8.6 mg by mouth 2 (two) times a day, Disp: , Rfl:   •  sertraline (ZOLOFT) 25 mg tablet, Take 100 mg by mouth daily, Disp: , Rfl:   •  sodium phosphate-biphosphate (FLEET) 7-19 g 118 mL enema, Insert 1 enema into the rectum, Disp: , Rfl:   •  vitamin B-12 (CYANOCOBALAMIN) 250 MCG tablet, Take 500 mcg by mouth daily, Disp: , Rfl:   •  warfarin (COUMADIN) 6 mg tablet, Take 6 mg by mouth daily, Disp: , Rfl:   Family History   Problem Relation Age of Onset   • Diabetes Father    • Kidney cancer Father    • Alzheimer's disease Mother    • Bone cancer Paternal Uncle    • Lung cancer Paternal Uncle               Coordination of Care: Wound team aware of the treatment plan. Facility nurse will provide wound treatment and monitor the wound for any changes. Patient / Staff education : Patient / Staff was given education on sign of infection and pressure ulcer prevention. Patient/ Staff verbalized understanding     Follow up :  Next week    Voice-recognition software may have been used in the preparation of this document. Occasional wrong word or "sound-alike" substitutions may have occurred due to the inherent limitations of voice recognition software.  Interpretation should be guided by context.       Johnathan La, DOMINIQUENP

## 2023-09-05 NOTE — ASSESSMENT & PLAN NOTE
Right lower leg  Partial-thickness, with no obvious sign of infection  Local wound care with Triad paste  Plan offload  Follow-up next week

## 2023-09-05 NOTE — ASSESSMENT & PLAN NOTE
Suprapubic catheter dislodged, with large amount of incontinent urine  Facility staff aware, facility staff to reinsert new suprapubic catheter

## 2023-09-05 NOTE — ASSESSMENT & PLAN NOTE
S/p ex lap, sigmoidectomy on 7/2/2022  -Wound was healed on October, 2022. Wound reopened and heal again on January, 2024.   Wound reopened again.  -100% granulation, with no obvious sign of infection  Local wound care with collagen  Continue to offload  Follow-up next week

## 2023-09-12 ENCOUNTER — NURSING HOME VISIT (OUTPATIENT)
Dept: WOUND CARE | Facility: HOSPITAL | Age: 73
End: 2023-09-12
Payer: MEDICARE

## 2023-09-12 DIAGNOSIS — G82.20 PARAPLEGIA FOLLOWING SPINAL CORD INJURY (HCC): ICD-10-CM

## 2023-09-12 DIAGNOSIS — L97.909 ARTERIAL LEG ULCER (HCC): Primary | ICD-10-CM

## 2023-09-12 DIAGNOSIS — Z98.890 S/P EXPLORATORY LAPAROTOMY: ICD-10-CM

## 2023-09-12 DIAGNOSIS — L89.152 PRESSURE INJURY OF SACRAL REGION, STAGE 2 (HCC): ICD-10-CM

## 2023-09-12 PROBLEM — T83.010A SUPRAPUBIC CATHETER DYSFUNCTION (HCC): Status: RESOLVED | Noted: 2023-09-05 | Resolved: 2023-09-12

## 2023-09-12 PROCEDURE — 99307 SBSQ NF CARE SF MDM 10: CPT | Performed by: NURSE PRACTITIONER

## 2023-09-13 NOTE — ASSESSMENT & PLAN NOTE
S/p ex lap, sigmoidectomy on 7/2/2022  -Wound was healed on October, 2022. Wound reopened and heal again on January, 2024. Wound reopened again.  -Wound improved, covered with superficial eschar  Local wound care with Skin-Prep  Sign off. Facility staff will continue to provide treatment and monitor the wound. Can reconsult wound nurse practitioner if wound failed to heal or worsened.

## 2023-09-19 ENCOUNTER — NURSING HOME VISIT (OUTPATIENT)
Dept: WOUND CARE | Facility: HOSPITAL | Age: 73
End: 2023-09-19
Payer: MEDICARE

## 2023-09-19 DIAGNOSIS — Z98.890 S/P EXPLORATORY LAPAROTOMY: Primary | ICD-10-CM

## 2023-09-19 PROCEDURE — 99307 SBSQ NF CARE SF MDM 10: CPT | Performed by: NURSE PRACTITIONER

## 2023-09-20 NOTE — ASSESSMENT & PLAN NOTE
Wound on the abdomen is almost healed, 100% epithelial  Continue Skin-Prep daily until completely healed  Sign off. Facility staff will continue to provide treatment and monitor the wound. Can reconsult wound nurse practitioner if wound failed to heal or worsened.

## 2023-09-20 NOTE — PROGRESS NOTES
[facility]   [facility]       Patient ID: Apryl Hackett is a 68 y.o. female Date of Birth 1950     Location of Service: [address] [place] [address]    Performed wound round with: Wound team     Chief Complaint :  abdomen    Wound Instructions:  Wound: Sacrum and buttocks  Cleanse with soap and water, pat dry  Apply hydraguard to skin  Twice a day and as needed for soiling    Apply moisturizing lotion to bilateral lower legs    Wound: Abdomen  Cleanse with normal saline solution or wound cleanser  Apply Skin-Prep to periwound area and wound bed until completely healed  Daily and as needed for soiling    Offload all wounds  Turn and reposition frequently  Increase protein intake. Monitor for any sign of infection or worsening, inform PCP or patient's primary physician in your facility. Allergies  Latex and Other      Assessment & Plan:  1. S/P exploratory laparotomy  Assessment & Plan:  Wound on the abdomen is almost healed, 100% epithelial  Continue Skin-Prep daily until completely healed  Sign off. Facility staff will continue to provide treatment and monitor the wound. Can reconsult wound nurse practitioner if wound failed to heal or worsened. Subjective:   September 5, 2023. New consult for wound on the buttocks, abdomen, and right lower leg. As per medical record review, patient was readmitted at [facility] for bowel obstruction on and was discharged back to short-term rehab. During her stay at [facility], she had a wound on the abdomen. Wound VAC was placed on the abdomen from August 28 to 30. Currently on local wound care. Patient also has wound on buttocks and right lower leg. September 12, 2023. Follow-up for wound on the buttocks, abdomen, and right lower leg. Received patient in bed, seems comfortable. Denies pain. September 19, 2023. Follow-up for wound on the abdomen.   Received patient in bed, seems comfortable. Denies pain. No significant issues related to the wound. Review of Systems   Constitutional: Negative. Respiratory: Negative. Gastrointestinal: Negative. Genitourinary:        Suprapubic catheter dislodges       Objective:    Physical Exam  Constitutional:       Appearance: Normal appearance. Cardiovascular:      Rate and Rhythm: Normal rate. Pulmonary:      Effort: Pulmonary effort is normal.   Genitourinary:     Comments: Suprapubic catheter dislodged, incontinent of large urine  Musculoskeletal:      Comments: paraplegia   Skin:     Findings: Lesion present. Comments: Abdomen: Wound size is 1 x 0.8 x 0.1 cm.,  100% epithelial, no drainage, no obvious sign of infection   Neurological:      Mental Status: She is alert. Procedures           Patient's care was coordinated with nursing facility staff. Recent vitals, labs and updated medications were reviewed on EMR or chart system of facility. Past Medical, surgical, social, medication and allergy history and patient's previous records were reviewed and updated as appropriate: Most up-to date information is available in the facility EMR where the patient is currently admitted.     Patient Active Problem List   Diagnosis   • Neurogenic bladder   • Chronic osteomyelitis ([facility])   • Depression   • DVT (deep venous thrombosis) ([facility])   • Heart murmur   • HTN (hypertension)   • Hyperlipidemia   • Hyponatremia   • Pressure ulcer of contiguous region involving left buttock and hip, unstageable ([facility])   • Kidney lesion, native, right   • Neurogenic bowel   • Paralysis ([address])   • 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[address])   • Parkinson's disease ([address])   • Postoperative anemia due to acute blood loss   • Seizure disorder ([facility])   • Protein-calorie malnutrition ([facility])   • Chronic right shoulder pain   • CHF (congestive heart failure) ([facility])   • Septic arthritis of hip ([facility])   • Atrial fibrillation with RVR ([facility])   • Chronic anticoagulation   • Pressure injury of 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 (HCC)   • Loose stools   • Anemia due to chronic illness   • Indigestion   • Deep tissue injury   • Open wound   • Venous ulcer (HCC)   • Pressure injury of left foot, unstageable (HCC)   • S/P exploratory laparotomy   • Arterial leg ulcer (HCC)   • Pressure injury of buttock, stage 3 (HCC)   • Pressure injury of sacral region, stage 2 (HCC)     Past Medical History:   Diagnosis Date   • Back pain    • Congestive heart failure (HCC)    • Depressive disorder    • Hypertension    • Neurogenic bladder    • Open wound of leg    • Osteoarthritis    • Osteomyelitis (HCC)    • Paraplegia (HCC)    • Seizure (HCC)    • Thrombosis    • Ulcer of ankle ([address])    • Urinary retention      Past Surgical History:   Procedure Laterality Date   • CYSTOSCOPY     • SKIN GRAFT       Social History     Socioeconomic History   • Marital status: /Civil Union     Spouse name: None   • Number of children: None   • Years of education: None   • Highest education level: None   Occupational History   • None   Tobacco Use   • Smoking status: Never   • Smokeless tobacco: Never   Substance and Sexual Activity   • Alcohol use: No   • Drug use: No   • Sexual activity: None   Other Topics Concern   • None   Social History Narrative   • None     Social Determinants of Health     Financial Resource Strain: Not on file   Food Insecurity: Not on file   Transportation Needs: Not on file   Physical Activity: Not on file   Stress: Not on file   Social Connections: Not on file   Intimate Partner Violence: Not on file   Housing Stability: Not on file        Current Outpatient Medications:   •  acetaminophen (TYLENOL) 325 mg tablet, Take 650 mg by mouth every 4 (four) hours as needed, Disp: , Rfl:   •  amLODIPine-benazepril (LOTREL) 10-20 MG per capsule, Take by mouth , Disp: , Rfl:   •  atorvastatin (LIPITOR) 20 mg tablet, Take 20 mg by mouth daily , Disp: , Rfl:   •  Biotin 10 MG CAPS, Take 10 mg by mouth daily, Disp: , Rfl:   •  bisacodyl (Dulcolax) 10 mg suppository, Insert 10 mg into the rectum daily, Disp: , Rfl:   •  carbidopa-levodopa (SINEMET)  mg per tablet, Take 1 tablet by mouth 4 (four) times a day, Disp: , Rfl:   •  Cranberry 450 MG TABS, Take 450 mg by mouth, Disp: , Rfl:   •  famotidine (PEPCID) 20 mg tablet, Take 20 mg by mouth 2 (two) times a day, Disp: , Rfl:   •  furosemide (LASIX) 20 mg tablet, Take 20 mg by mouth daily, Disp: , Rfl:   •  magnesium hydroxide (MILK OF MAGNESIA) 400 mg/5 mL oral suspension, Take by mouth daily as needed for constipation, Disp: , Rfl:   •  metoprolol tartrate (LOPRESSOR) 50 mg tablet, Take 50 mg by mouth 2 (two) times a day, Disp: , Rfl:   •  mirtazapine (REMERON) 15 mg tablet, Take 7.5 mg by mouth daily at bedtime, Disp: , Rfl:   •  Multiple Vitamin (DAILY VALUE MULTIVITAMIN) TABS, Take 1 tablet by mouth daily , Disp: , Rfl:   •  Multiple Vitamins-Minerals (HAIR SKIN NAILS PO), Take 3 tablets by mouth daily, Disp: , Rfl:   •  Nutritional Supplements (Micha) PACK, Take by mouth, Disp: , Rfl:   •  Omega-3 Fatty Acids (FISH OIL) 1,000 mg, Take 1,000 mg by mouth daily , Disp: , Rfl:   •  oxyCODONE (ROXICODONE) 5 mg immediate release tablet, Take 1 tablet (5 mg total) by mouth every 6 (six) hours as needed for moderate painMax Daily Amount: 20 mg, Disp: 15 tablet, Rfl: 0  •  phenytoin (DILANTIN) 100 mg ER capsule, Take 100 mg by mouth every 12 (twelve) hours , Disp: , Rfl:   •  potassium chloride (K-DUR,KLOR-CON) 10 mEq tablet, , Disp: , Rfl:   •  psyllium (Metamucil) 0.52 g capsule, Take 0.52 g by mouth daily, Disp: , Rfl:   •  saccharomyces boulardii (Florastor) 250 mg capsule, Take 250 mg by mouth 2 (two) times a day, Disp: , Rfl:   •  senna (SENOKOT) 8.6 MG tablet, Take 8.6 mg by mouth 2 (two) times a day, Disp: , Rfl:   •  sertraline (ZOLOFT) 25 mg tablet, Take 100 mg by mouth daily, Disp: , Rfl:   •  sodium phosphate-biphosphate (FLEET) 7-19 g 118 mL enema, Insert 1 enema into the rectum, Disp: , Rfl:   •  vitamin B-12 (CYANOCOBALAMIN) 250 MCG tablet, Take 500 mcg by mouth daily, Disp: , Rfl:   •  warfarin (COUMADIN) 6 mg tablet, Take 6 mg by mouth daily, Disp: , Rfl:   Family History   Problem Relation Age of Onset   • Diabetes Father    • Kidney cancer Father    • Alzheimer's disease Mother    • Bone cancer Paternal Uncle    • Lung cancer Paternal Uncle               Coordination of Care: Wound team aware of the treatment plan. Facility nurse will provide wound treatment and monitor the wound for any changes. Patient / Staff education : Patient / Staff was given education on sign of infection and pressure ulcer prevention. Patient/ Staff verbalized understanding     Follow up :  Sign off    Voice-recognition software may have been used in the preparation of this document. Occasional wrong word or "sound-alike" substitutions may have occurred due to the inherent limitations of voice recognition software. Interpretation should be guided by context.       SVETLANA Ernandez
